# Patient Record
Sex: MALE | Race: WHITE | NOT HISPANIC OR LATINO | Employment: OTHER
[De-identification: names, ages, dates, MRNs, and addresses within clinical notes are randomized per-mention and may not be internally consistent; named-entity substitution may affect disease eponyms.]

---

## 2018-07-18 ENCOUNTER — TRANSCRIBE ORDERS (OUTPATIENT)
Dept: SCHEDULING | Age: 73
End: 2018-07-18

## 2018-07-18 DIAGNOSIS — I87.2 VENOUS INSUFFICIENCY (CHRONIC) (PERIPHERAL): Primary | ICD-10-CM

## 2018-07-20 ENCOUNTER — HOSPITAL ENCOUNTER (OUTPATIENT)
Dept: CARDIOLOGY | Facility: HOSPITAL | Age: 73
Discharge: HOME | End: 2018-07-20
Attending: RADIOLOGY
Payer: MEDICARE

## 2018-07-20 DIAGNOSIS — I87.2 VENOUS INSUFFICIENCY (CHRONIC) (PERIPHERAL): ICD-10-CM

## 2018-07-24 ENCOUNTER — HOSPITAL ENCOUNTER (OUTPATIENT)
Dept: RADIOLOGY | Facility: HOSPITAL | Age: 73
Discharge: HOME | End: 2018-07-24
Attending: RADIOLOGY
Payer: MEDICARE

## 2018-07-24 DIAGNOSIS — I87.2 PERIPHERAL VENOUS INSUFFICIENCY: ICD-10-CM

## 2018-07-24 DIAGNOSIS — R60.0 LOCALIZED EDEMA: ICD-10-CM

## 2018-07-24 PROCEDURE — 93971 EXTREMITY STUDY: CPT | Mod: RT

## 2018-07-25 RX ORDER — SODIUM CHLORIDE 9 MG/ML
40 INJECTION, SOLUTION INTRAVENOUS CONTINUOUS
Status: CANCELLED | OUTPATIENT
Start: 2018-07-25 | End: 2018-08-01

## 2018-08-10 ENCOUNTER — HOSPITAL ENCOUNTER (OUTPATIENT)
Dept: RADIOLOGY | Facility: HOSPITAL | Age: 73
Discharge: HOME | End: 2018-08-10
Attending: RADIOLOGY
Payer: MEDICARE

## 2018-08-10 VITALS
SYSTOLIC BLOOD PRESSURE: 129 MMHG | WEIGHT: 248 LBS | RESPIRATION RATE: 16 BRPM | HEIGHT: 72 IN | TEMPERATURE: 97.4 F | OXYGEN SATURATION: 97 % | HEART RATE: 71 BPM | DIASTOLIC BLOOD PRESSURE: 67 MMHG | BODY MASS INDEX: 33.59 KG/M2

## 2018-08-10 DIAGNOSIS — I87.399: ICD-10-CM

## 2018-08-10 LAB
BASOPHILS # BLD: 0.03 K/UL (ref 0.01–0.1)
BASOPHILS NFR BLD: 0.6 %
DIFFERENTIAL METHOD BLD: ABNORMAL
EOSINOPHIL # BLD: 0.18 K/UL (ref 0.04–0.54)
EOSINOPHIL NFR BLD: 3.6 %
ERYTHROCYTE [DISTWIDTH] IN BLOOD BY AUTOMATED COUNT: 12.6 % (ref 11.6–14.4)
GLUCOSE BLD-MCNC: 130 MG/DL (ref 70–99)
HCT VFR BLDCO AUTO: 41.3 % (ref 40.1–51)
HGB BLD-MCNC: 14.1 G/DL (ref 13.7–17.5)
IMM GRANULOCYTES # BLD AUTO: 0.03 K/UL (ref 0–0.08)
IMM GRANULOCYTES NFR BLD AUTO: 0.6 %
INR PPP: 2.2 INR
LYMPHOCYTES # BLD: 0.76 K/UL (ref 1.2–3.5)
LYMPHOCYTES NFR BLD: 15.2 %
MCH RBC QN AUTO: 31.8 PG (ref 28–33.2)
MCHC RBC AUTO-ENTMCNC: 34.1 G/DL (ref 32.2–36.5)
MCV RBC AUTO: 93.2 FL (ref 83–98)
MONOCYTES # BLD: 0.38 K/UL (ref 0.3–1)
MONOCYTES NFR BLD: 7.6 %
NEUTROPHILS # BLD: 3.63 K/UL (ref 1.7–7)
NEUTS SEG NFR BLD: 72.4 %
NRBC BLD-RTO: 0 %
PDW BLD AUTO: 9.6 FL (ref 9.4–12.4)
PLATELET # BLD AUTO: 105 K/UL (ref 150–350)
POCT TEST: ABNORMAL
PROTHROMBIN TIME: 25.1 SEC. (ref 12.2–14.5)
RBC # BLD AUTO: 4.43 M/UL (ref 4.5–5.8)
WBC # BLD AUTO: 5.01 K/UL (ref 3.8–10.5)

## 2018-08-10 PROCEDURE — 36415 COLL VENOUS BLD VENIPUNCTURE: CPT | Performed by: PHYSICIAN ASSISTANT

## 2018-08-10 PROCEDURE — 85025 COMPLETE CBC W/AUTO DIFF WBC: CPT | Performed by: PHYSICIAN ASSISTANT

## 2018-08-10 PROCEDURE — 25800000 HC PHARMACY IV SOLUTIONS: Performed by: PHYSICIAN ASSISTANT

## 2018-08-10 PROCEDURE — 63600000 HC DRUGS/DETAIL CODE: Performed by: RADIOLOGY

## 2018-08-10 PROCEDURE — 36100345 IR SCLEROTHERAPY

## 2018-08-10 PROCEDURE — 3E033TZ INTRODUCTION OF DESTRUCTIVE AGENT INTO PERIPHERAL VEIN, PERCUTANEOUS APPROACH: ICD-10-PCS | Performed by: RADIOLOGY

## 2018-08-10 PROCEDURE — 85610 PROTHROMBIN TIME: CPT | Mod: GZ | Performed by: PHYSICIAN ASSISTANT

## 2018-08-10 RX ORDER — ROSUVASTATIN CALCIUM 20 MG/1
20 TABLET, COATED ORAL EVERY EVENING
COMMUNITY

## 2018-08-10 RX ORDER — WARFARIN 2.5 MG/1
2.5 TABLET ORAL WEEKLY
COMMUNITY
End: 2021-06-07 | Stop reason: HOSPADM

## 2018-08-10 RX ORDER — SODIUM CHLORIDE 9 MG/ML
40 INJECTION, SOLUTION INTRAVENOUS CONTINUOUS
Status: DISCONTINUED | OUTPATIENT
Start: 2018-08-10 | End: 2018-08-10 | Stop reason: HOSPADM

## 2018-08-10 RX ORDER — DOFETILIDE 0.25 MG/1
250 CAPSULE ORAL 2 TIMES DAILY
COMMUNITY
End: 2023-12-12 | Stop reason: HOSPADM

## 2018-08-10 RX ORDER — FUROSEMIDE 20 MG/1
20 TABLET ORAL DAILY
COMMUNITY

## 2018-08-10 RX ORDER — CARVEDILOL 12.5 MG/1
12.5 TABLET ORAL 2 TIMES DAILY WITH MEALS
COMMUNITY

## 2018-08-10 RX ORDER — ASPIRIN 81 MG/1
81 TABLET ORAL DAILY
COMMUNITY

## 2018-08-10 RX ORDER — FENTANYL CITRATE 50 UG/ML
INJECTION, SOLUTION INTRAMUSCULAR; INTRAVENOUS
Status: COMPLETED | OUTPATIENT
Start: 2018-08-10 | End: 2018-08-10

## 2018-08-10 RX ORDER — LOSARTAN POTASSIUM 100 MG/1
100 TABLET ORAL EVERY EVENING
Status: ON HOLD | COMMUNITY
End: 2023-11-28 | Stop reason: ALTCHOICE

## 2018-08-10 RX ORDER — POTASSIUM CHLORIDE 20 MEQ/1
20 TABLET, EXTENDED RELEASE ORAL DAILY
COMMUNITY

## 2018-08-10 RX ADMIN — SODIUM CHLORIDE 40 ML/HR: 9 INJECTION, SOLUTION INTRAVENOUS at 09:37

## 2018-08-10 RX ADMIN — FENTANYL CITRATE 50 MCG: 50 INJECTION INTRAMUSCULAR; INTRAVENOUS at 10:51

## 2018-08-10 NOTE — POST-PROCEDURE NOTE
Interventional Radiology Brief Postprocedure Note    Roberto Stiles     Attending: Jaclyn    Assistant:      Diagnosis: Recurrent right lower leg swelling; right lower leg superficial venous varicosities    Description of procedure: u/s guided sclerotherapy of right lower leg superficial venous varicosities    Contrast:       Anesthesia:   None    Medications: Fentanyl 50 mcg IV     Complications: None      Estimated Blood Loss: Estimated Blood Loss: 0-10 ml    Anticoagulation:      Specimens:      Findings: Successful u/s guided sclerotherapy of right lower leg superficial venous varicosities - 3 access sites.    8/10/2018 11:08 AM

## 2018-08-10 NOTE — DISCHARGE INSTRUCTIONS
"Ultrasound- guided Lower Extremity Sclerotherapy    Discharge Instructions  You have had ultrasound guided sclerotherapy of lower leg superficial venous varicosities.    You may resume your normal diet.  You may resume your normal medications.   Please resume normal activities. It is best to be active.    It is normal to experience some soreness at the access sites or a \"cord-like\" sensation over the treated veins for few days.     If the treated veins becomes very tender to touch, you are likely experiencing post-ablation thrombophlebitis. This can be easily treated with anti-inflammatory medication. The recommended dosage is 800 mg Ibuprofen every 8 hours for 1.5 days.     Notify your primary physician and/or Interventional Radiologist IMMEDIATELY if any of the following occur:  · Fever of 101° F (38 ° C) or chills  · Swelling and or redness near the access sites  · Worsening leg pain or neuropathy      Physician Contact Information  • If you have a problem that you believe requires immediate attention, you should go to the Emergency Room, either at [facility] or the closest hospital. If you believe that your problem can safely wait until you speak to a physician, you should contact your doctor or Interventional Radiologist.   • It is usually easier to contact your own physician by calling the office, or after hours through the answering service. If you do not know the number, the hospital  can connect you by calling [phone].   • If you have concerns that need to be answered by the Interventional Radiologist, you can reach him/ her as follows:   o During regular weekday hours (8AM to 5PM), call [phone] and ask the technologist to connect you with the Interventional Radiologist.   o During off hours for emergencies call [phone] and ask the hospital  to contact the Interventional Radiologist on-call.  \      "

## 2019-04-02 ENCOUNTER — APPOINTMENT (OUTPATIENT)
Dept: LAB | Facility: HOSPITAL | Age: 74
End: 2019-04-02
Attending: ORTHOPAEDIC SURGERY
Payer: MEDICARE

## 2019-04-02 ENCOUNTER — TRANSCRIBE ORDERS (OUTPATIENT)
Dept: PREADMISSION TESTING | Facility: HOSPITAL | Age: 74
End: 2019-04-02

## 2019-04-02 ENCOUNTER — HOSPITAL ENCOUNTER (OUTPATIENT)
Dept: CARDIOLOGY | Facility: HOSPITAL | Age: 74
Discharge: HOME | End: 2019-04-02
Attending: ORTHOPAEDIC SURGERY
Payer: MEDICARE

## 2019-04-02 ENCOUNTER — APPOINTMENT (OUTPATIENT)
Dept: PREADMISSION TESTING | Facility: HOSPITAL | Age: 74
End: 2019-04-02
Attending: ORTHOPAEDIC SURGERY
Payer: MEDICARE

## 2019-04-02 VITALS
TEMPERATURE: 98.4 F | DIASTOLIC BLOOD PRESSURE: 60 MMHG | BODY MASS INDEX: 33.05 KG/M2 | HEART RATE: 76 BPM | OXYGEN SATURATION: 97 % | HEIGHT: 72 IN | SYSTOLIC BLOOD PRESSURE: 110 MMHG | WEIGHT: 244 LBS | RESPIRATION RATE: 18 BRPM

## 2019-04-02 DIAGNOSIS — Z01.818 ENCOUNTER FOR OTHER PREPROCEDURAL EXAMINATION: ICD-10-CM

## 2019-04-02 DIAGNOSIS — I48.91 ATRIAL FIBRILLATION, UNSPECIFIED TYPE (CMS/HCC): ICD-10-CM

## 2019-04-02 DIAGNOSIS — M17.12 PRIMARY OSTEOARTHRITIS OF LEFT KNEE: ICD-10-CM

## 2019-04-02 DIAGNOSIS — I10 ESSENTIAL HYPERTENSION: ICD-10-CM

## 2019-04-02 DIAGNOSIS — Z79.4 TYPE 2 DIABETES MELLITUS WITHOUT COMPLICATION, WITH LONG-TERM CURRENT USE OF INSULIN (CMS/HCC): ICD-10-CM

## 2019-04-02 DIAGNOSIS — E11.9 TYPE 2 DIABETES MELLITUS WITHOUT COMPLICATION, WITH LONG-TERM CURRENT USE OF INSULIN (CMS/HCC): ICD-10-CM

## 2019-04-02 DIAGNOSIS — E78.5 HYPERLIPIDEMIA, UNSPECIFIED HYPERLIPIDEMIA TYPE: ICD-10-CM

## 2019-04-02 DIAGNOSIS — M17.12 PRIMARY OSTEOARTHRITIS OF LEFT KNEE: Primary | ICD-10-CM

## 2019-04-02 DIAGNOSIS — G47.33 OSA ON CPAP: ICD-10-CM

## 2019-04-02 DIAGNOSIS — Z01.818 PREOPERATIVE EXAMINATION: Primary | ICD-10-CM

## 2019-04-02 LAB
ANION GAP SERPL CALC-SCNC: 14 MEQ/L (ref 3–15)
ATRIAL RATE: 74
BUN SERPL-MCNC: 25 MG/DL (ref 8–20)
CALCIUM SERPL-MCNC: 9.1 MG/DL (ref 8.9–10.3)
CHLORIDE SERPL-SCNC: 104 MEQ/L (ref 98–109)
CO2 SERPL-SCNC: 18 MEQ/L (ref 22–32)
CREAT SERPL-MCNC: 1.1 MG/DL
ERYTHROCYTE [DISTWIDTH] IN BLOOD BY AUTOMATED COUNT: 12.9 % (ref 11.6–14.4)
EST. AVERAGE GLUCOSE BLD GHB EST-MCNC: 160 MG/DL
GFR SERPL CREATININE-BSD FRML MDRD: >60 ML/MIN/1.73M*2
GLUCOSE SERPL-MCNC: 152 MG/DL (ref 70–99)
HBA1C MFR BLD HPLC: 7.2 %
HCT VFR BLDCO AUTO: 44.8 %
HGB BLD-MCNC: 15.6 G/DL
MCH RBC QN AUTO: 31.9 PG (ref 28–33.2)
MCHC RBC AUTO-ENTMCNC: 34.8 G/DL (ref 32.2–36.5)
MCV RBC AUTO: 91.6 FL (ref 83–98)
PDW BLD AUTO: 9.5 FL (ref 9.4–12.4)
PLATELET # BLD AUTO: 148 K/UL
POTASSIUM SERPL-SCNC: 4.7 MEQ/L (ref 3.6–5.1)
QRS DURATION: 142
QT INTERVAL: 450
QTC CALCULATION(BAZETT): 502
R AXIS: 73
RBC # BLD AUTO: 4.89 M/UL (ref 4.5–5.8)
SODIUM SERPL-SCNC: 136 MEQ/L (ref 136–144)
T WAVE AXIS: 251
VENTRICULAR RATE: 75
WBC # BLD AUTO: 5.17 K/UL

## 2019-04-02 PROCEDURE — 36415 COLL VENOUS BLD VENIPUNCTURE: CPT

## 2019-04-02 PROCEDURE — 93005 ELECTROCARDIOGRAM TRACING: CPT

## 2019-04-02 PROCEDURE — 83036 HEMOGLOBIN GLYCOSYLATED A1C: CPT | Mod: GA

## 2019-04-02 PROCEDURE — 82310 ASSAY OF CALCIUM: CPT

## 2019-04-02 PROCEDURE — 85027 COMPLETE CBC AUTOMATED: CPT

## 2019-04-02 PROCEDURE — 99203 OFFICE O/P NEW LOW 30 MIN: CPT | Performed by: HOSPITALIST

## 2019-04-02 RX ORDER — VIT C/E/ZN/COPPR/LUTEIN/ZEAXAN 250MG-90MG
1000 CAPSULE ORAL DAILY
COMMUNITY

## 2019-04-02 RX ORDER — WARFARIN SODIUM 5 MG/1
5 TABLET ORAL EVERY EVENING
COMMUNITY
End: 2021-06-07 | Stop reason: HOSPADM

## 2019-04-02 ASSESSMENT — PAIN SCALES - GENERAL: PAINLEVEL: 0-NO PAIN

## 2019-04-02 NOTE — CONSULTS
[facility] Medicine Service -  Pre-Operative Consultation       Patient Name: Roberto Stiles  Referring Surgeon: Dr. Shafer    Reason for Referral: Pre-Operative Evaluation  Surgical Procedure: L TKA  Operative Date: 4/16/19  Other Providers:      PCP: Willa Ambrocio MD        HISTORY OF PRESENT ILLNESS      Roberto Stiles is a 73 y.o. male presenting today to the [facility] at [facility] for pre-operative evaluation prior to planned surgery.    Patient has a PMH of bacterial endocarditis, DVT, HTN, mitral valve replacement, SANA on CPAP, atrial fibrillation,  kidney stones, HLD, MI, and DM.    The patient denies any current or recent chest pain or pressure, dyspnea, cough, sputum, fevers, chills, abdominal pain, nausea, vomiting, diarrhea or other symptoms. Functionally, the patient is able to ascend a flight or so of stairs with no dyspnea or chest pain.     The patient is scheduled to see his Cardiologist Dr. Teran this coming Monday. He states he is compliant with all of his cardiac medications. Patient has a significant history of Vtach and afib requiring ablation x3. He has a PPM/defibrillator that has been replaced once. He had his mitral valve replaced many years ago.     He also is on multiple medications for DM and is compliant with his regimen. His last A1c was 8 in November and he is anxious about his recheck today.    As far as his knee, he has had chronic pain for approximately 10 years ago. He had a fall where he landed on his knee this past January, which acutely worsened his arthritis and pain. He has been wearing a knee brace which has provided relief. Prior to this he was receiving cortisone shots.    The patient denies, on specific questioning, the following:  No history of CHF.  No history of PE.  No history of COPD.  No history of CVA.   No history of CKD.     PAST MEDICAL AND SURGICAL HISTORY      Past Medical History:   Diagnosis Date   • A-fib  (CMS/HCC) (HCC)    • Arthritis     OA   • Bacterial endocarditis    • Deep vein thrombosis (CMS/HCC) (HCC)     LLE over 30 years ago   • History of transfusion     for MV replacement x34 years ago   • Hypertension    • Kidney stones    • Lipid disorder    • Myocardial infarction (CMS/HCC) (HCC)    • SCC (squamous cell carcinoma)     right ear   • Sleep apnea     wears CPAP   • Type 2 diabetes mellitus (CMS/HCC) (HCC)     last A1C 11/2018   • Venous insufficiency        Past Surgical History:   Procedure Laterality Date   • CARDIAC ELECTROPHYSIOLOGY MAPPING AND ABLATION     • CARDIAC SURGERY     • MITRAL VALVE REPLACEMENT     • TONSILLECTOMY         MEDICATIONS        Current Outpatient Prescriptions:   •  cholecalciferol, vitamin D3, (VITAMIN D3) 1,000 unit capsule, Take 1,000 Units by mouth daily., Disp: , Rfl:   •  FISH OIL-omega-3 fatty acids (FISH OIL) 340-1,000 mg capsule, Take 1 capsule by mouth 2 (two) times a day., Disp: , Rfl:   •  multivitamin tablet, Take by mouth daily., Disp: , Rfl:   •  NOT IN DATABASE, Prevagen 10mg daily, Disp: , Rfl:   •  warfarin (COUMADIN) 5 mg tablet, Take 5 mg by mouth every evening. Mon, Tues, Wed, Thurs Sat and Sun, Disp: , Rfl:   •  aspirin 81 mg enteric coated tablet, Take 81 mg by mouth daily., Disp: , Rfl:   •  carvedilol (COREG) 12.5 mg tablet, Take 12.5 mg by mouth 2 (two) times a day with meals., Disp: , Rfl:   •  dofetilide (TIKOSYN) 250 mcg capsule, Take 250 mcg by mouth 2 (two) times a day., Disp: , Rfl:   •  empagliflozin-metformin (SYNJARDY) 12.5-500 mg tablet, Take 1 tablet by mouth 2 (two) times a day., Disp: , Rfl:   •  furosemide (LASIX) 20 mg tablet, Take 20 mg by mouth daily. As needed for leg swelling, Disp: , Rfl:   •  insulin detemir U-100 (LEVEMIR) 100 unit/mL (3 mL) subcutaneous pen, Inject 45 Units under the skin nightly., Disp: , Rfl:   •  losartan (COZAAR) 100 mg tablet, Take 100 mg by mouth every evening.  , Disp: , Rfl:   •  potassium chloride  (KLOR-CON) 20 mEq CR tablet, Take 20 mEq by mouth daily., Disp: , Rfl:   •  rosuvastatin (CRESTOR) 20 mg tablet, Take 20 mg by mouth every evening.  , Disp: , Rfl:   •  semaglutide (OZEMPIC) 1 mg/0.75 mL (2 mg/1.5 mL) pen injector, Inject 1 mg/mL under the skin once a week. Patient takes on SUNday, Disp: , Rfl:   •  warfarin (COUMADIN) 2.5 mg tablet, Take 2.5 mg by mouth once a week. Friday only , Disp: , Rfl:     ALLERGIES      Patient has no known allergies.    FAMILY HISTORY      family history is not on file.    Denies any prior known family history of DVTs/PEs/clotting disorder    SOCIAL HISTORY      Social History   Substance Use Topics   • Smoking status: Never Smoker   • Smokeless tobacco: Never Used   • Alcohol use 1.8 oz/week     3 Shots of liquor per week       REVIEW OF SYSTEMS      All other systems reviewed and negative except as noted in HPI    PHYSICAL EXAMINATION      /60 (BP Location: Right upper arm, Patient Position: Sitting)   Pulse 76   Temp 36.9 °C (98.4 °F) (Temporal)   Resp 18   Ht 1.829 m (6')   Wt 111 kg (244 lb)   SpO2 97%   BMI 33.09 kg/m²   Body mass index is 33.09 kg/m².    Physical Exam  General: NAD, calm, pleasant  HEENT: atraumatic  Cardiovascular: RRR, systolic click; S1/S2+  Pulmonary: CTAB; no rales, rhonchi or wheezing  Gi: Soft, nontender, nondistended  Ext: trace pedal edema; bilateral lower extremity discoloration  Neuro: Alert and oriented x3; no sensory or motor deficits  Psych: Calm, appropriate answers    LABS / EKG        Labs    Results from last 7 days  Lab Units 04/02/19  1040   WBC K/uL 5.17   HEMOGLOBIN g/dL 15.6   HEMATOCRIT % 44.8   PLATELETS K/uL 148*       Results from last 7 days  Lab Units 04/02/19  1040   SODIUM mEQ/L 136   POTASSIUM mEQ/L 4.7   CHLORIDE mEQ/L 104   CO2 mEQ/L 18*   BUN mg/dL 25*   CREATININE mg/dL 1.1   GLUCOSE mg/dL 152*   CALCIUM mg/dL 9.1       Results from last 7 days  Lab Units 04/02/19  1040   HEMOGLOBIN A1C % 7.2*        Lab Results   Component Value Date     07/06/2015    K 4.5 07/06/2015     (H) 07/06/2015    BUN 22 (H) 07/06/2015    CREATININE 1.3 07/06/2015    WBC 5.01 08/10/2018    HGB 14.1 08/10/2018    HCT 41.3 08/10/2018     (L) 08/10/2018    ALT 30 07/06/2015    AST 24 07/06/2015    INR 2.2 08/10/2018    HGBA1C 11.3 (H) 06/02/2015         ECG/Telemetry  I have independently reviewed the ECG. Significant findings include v-paced rhythm.    ASSESSMENT AND PLAN         Preoperative examination  -Patient has no cardiac symptoms.   -Patient exhibits no signs/symptoms of angina, arrythmia or decompenstated CHF.   -Patient does not have new significant EKG changes  -Patient has been advised to avoid NSAIDs 5-7 days prior to surgery  -He must receive clearance from his Cardiologist Dr. Teran prior to surgery. Will need recommendations from him regarding his ASA, coumadin, and fish oil  -Will also need to check a HbA1c prior to surgery    Type 2 diabetes mellitus, with long-term current use of insulin (CMS/[facility])  -Last Hba1c 8 in November, 7.2 today  -Continue Levemir, empaglifozin-metformin, and Ozempic  -Patient will need to take 1/2 dose of Levemir prior to surgery  -He should hold his empaglifozin-metformin on day of surgery. OK to take Ozempic on Sunday prior to surgery    SANA on CPAP  -Continue CPAP nightly  -Instructed to bring machine on day of surgery    Essential hypertension  -Continue Coreg and Losartan  -Hold Losartan on morning of surgery  -Continue to monitor pressures    Atrial fibrillation (CMS/[facility])  -S/P history of ablation (for afib and Vtach)  -Continue ASA, Coreg, Tikosyn, Coumadin  -Need recommendations from Dr. Teran regarding when to hold ASA/ coumadin prior to surgery  -Will need telemetry at all times perioperatively due to Tikosyn. Should also check BMP/mag/phos daily due to this medication    HLD (hyperlipidemia)  -Continue statin and fish oil  -Will need to hold fish oil at  least 10 days prior to surgery       In regards to perioperative cardiac risk:  The patient has a history of ischemic heart disease, denies any history of CHF, denies any history of CVA, is on pre-operative treatment with insulin, and does not have a pre-operative creatinine > 2 mg/dL.   The Revised Cardiac Risk Index (RCRI) for this patient indicates 6.6 % risk for rate of cardiac death, nonfatal myocardial infarction, and nonfatal cardiac arrest.     Further comments:  Resume supplements when OK with surgical team.  I would encourage incentive spirometry to assist with minimizing navid-operative pulmonary risk.  DVT prophylaxis and timing of such per the discretion of the surgeon.     Please do not hesitate to contact [facility] during the upcoming hospitalization with any questions or concerns.     Denae Duke, DO  4/2/2019

## 2019-04-02 NOTE — ASSESSMENT & PLAN NOTE
-Continue Coreg and Losartan  -Hold Losartan on morning of surgery  -Continue to monitor pressures

## 2019-04-02 NOTE — ASSESSMENT & PLAN NOTE
-S/P history of ablation (for afib and Vtach)  -Continue ASA, Coreg, Tikosyn, Coumadin  -Need recommendations from Dr. Teran regarding when to hold ASA/ coumadin prior to surgery  -Will need telemetry at all times perioperatively due to Tikosyn. Should also check BMP/mag/phos daily due to this medication

## 2019-04-02 NOTE — PRE-PROCEDURE INSTRUCTIONS
1. Admissions will call you with your arrival time on April 15th (day prior to surgery) between 2pm - 4pm. For questions about your arrival time, please call [phone].    2. Please report to the [facility] on the day of your procedure. Bring your insurance card and photo ID.    3. Please follow these fasting guidelines:   -Stop food and liquids 8 hours before your arrival time.   -You may continue to drink up to 12 oz of water,but you must stop 2 hours prior to your arrival time.   -There is nothing to drink 2 hours before your arrival time (including gum, mints, candy, and water). May Brush Teeth    4. Early on the morning of the procedure, please take the following medications listed below with a sip of water, in addition to any medications prescribed by your surgeon: Tikosyn, and Carvedilol, bring in CPAP      *NO aspirin, ibuprofen, anti-inflammatories, fish oil or Vitamin E 14 days prior to surgery unless ordered by physician.    *Stop taking coumadin, aspirin and fish oil per Dr Teran     5. Other instructions: antibacterial shower x 2- the night before and the morning of surgery    6. If you develop a cough, cold, fever, or other symptom prior to the date of the procedure, please report it to your physician immediately.    7. If you need to cancel the procedure for any reason, please contact your physician.    8. Make arrangements to have someone drive you home from the procedure. If you have not arranged for transportation home, your surgery may be cancelled.     9. You may not take public transportation or or a cab unless accompanied by a responsible person.    10. You may not drive a car or operate complex or potentially dangerous machinery for 24 hours following anesthesia and/or sedation.    11. If it is medically necessary for you to have a longer stay, you will be informed as soon as the decision is made.    12. Do not wear or bring anything of value to the hospital, including jewelry of any  kind. Do not wear make-up or contact lenses. DO bring your glasses and hearing aid, with a case.    13. Dress in comfortable clothes.    14. If instructed, please bring a copy of your Advance Directive (Living Will/Durable Power of ) on the day of your procedure.    Pre operative instructions given as per protocol.  Form explained by:     Radha Bui RN

## 2019-04-02 NOTE — ASSESSMENT & PLAN NOTE
-Last Hba1c 8 in November, 7.2 today  -Continue Levemir, empaglifozin-metformin, and Ozempic  -Patient will need to take 1/2 dose of Levemir prior to surgery  -He should hold his empaglifozin-metformin on day of surgery. OK to take Ozempic on Sunday prior to surgery

## 2019-04-02 NOTE — ASSESSMENT & PLAN NOTE
-Patient has no cardiac symptoms.   -Patient exhibits no signs/symptoms of angina, arrythmia or decompenstated CHF.   -Patient does not have new significant EKG changes  -Patient has been advised to avoid NSAIDs 5-7 days prior to surgery  -He must receive clearance from his Cardiologist Dr. Teran prior to surgery. Will need recommendations from him regarding his ASA, coumadin, and fish oil  -Will also need to check a HbA1c prior to surgery

## 2019-04-16 ENCOUNTER — ANESTHESIA EVENT (OUTPATIENT)
Dept: OPERATING ROOM | Facility: HOSPITAL | Age: 74
Setting detail: SURGERY ADMIT
DRG: 470 | End: 2019-04-16
Payer: MEDICARE

## 2019-04-16 ENCOUNTER — HOSPITAL ENCOUNTER (INPATIENT)
Facility: HOSPITAL | Age: 74
LOS: 3 days | Discharge: HOME | DRG: 470 | End: 2019-04-19
Attending: ORTHOPAEDIC SURGERY | Admitting: ORTHOPAEDIC SURGERY
Payer: MEDICARE

## 2019-04-16 ENCOUNTER — APPOINTMENT (OUTPATIENT)
Dept: RADIOLOGY | Facility: HOSPITAL | Age: 74
Setting detail: SURGERY ADMIT
DRG: 470 | End: 2019-04-16
Attending: NURSE PRACTITIONER
Payer: MEDICARE

## 2019-04-16 DIAGNOSIS — I48.91 ATRIAL FIBRILLATION, UNSPECIFIED TYPE (CMS/HCC): ICD-10-CM

## 2019-04-16 DIAGNOSIS — M17.12 OSTEOARTHRITIS OF LEFT KNEE, UNSPECIFIED OSTEOARTHRITIS TYPE: ICD-10-CM

## 2019-04-16 DIAGNOSIS — Z96.652 S/P TOTAL KNEE ARTHROPLASTY, LEFT: Primary | ICD-10-CM

## 2019-04-16 PROBLEM — Z95.3 HISTORY OF HEART VALVE REPLACEMENT WITH BIOPROSTHETIC VALVE: Status: ACTIVE | Noted: 2019-04-16

## 2019-04-16 LAB
APTT PPP: 39 SEC (ref 23–35)
GLUCOSE BLD-MCNC: 135 MG/DL (ref 70–99)
GLUCOSE BLD-MCNC: 156 MG/DL (ref 70–99)
GLUCOSE BLD-MCNC: 164 MG/DL (ref 70–99)
GLUCOSE BLD-MCNC: 172 MG/DL (ref 70–99)
INR PPP: 1.9 INR
POCT TEST: ABNORMAL
PROTHROMBIN TIME: 20.5 SEC (ref 12.2–14.5)

## 2019-04-16 PROCEDURE — 25800000 HC PHARMACY IV SOLUTIONS: Performed by: NURSE ANESTHETIST, CERTIFIED REGISTERED

## 2019-04-16 PROCEDURE — 63600000 HC DRUGS/DETAIL CODE: Performed by: NURSE PRACTITIONER

## 2019-04-16 PROCEDURE — 25000000 HC PHARMACY GENERAL: Performed by: NURSE ANESTHETIST, CERTIFIED REGISTERED

## 2019-04-16 PROCEDURE — 25800000 HC PHARMACY IV SOLUTIONS: Performed by: NURSE PRACTITIONER

## 2019-04-16 PROCEDURE — 25000000 HC PHARMACY GENERAL: Performed by: ORTHOPAEDIC SURGERY

## 2019-04-16 PROCEDURE — 0SRD0J9 REPLACEMENT OF LEFT KNEE JOINT WITH SYNTHETIC SUBSTITUTE, CEMENTED, OPEN APPROACH: ICD-10-PCS | Performed by: ORTHOPAEDIC SURGERY

## 2019-04-16 PROCEDURE — C1713 ANCHOR/SCREW BN/BN,TIS/BN: HCPCS | Performed by: ORTHOPAEDIC SURGERY

## 2019-04-16 PROCEDURE — 71000011 HC PACU PHASE 1 EA ADDL MIN: Performed by: ORTHOPAEDIC SURGERY

## 2019-04-16 PROCEDURE — 97116 GAIT TRAINING THERAPY: CPT | Mod: GP

## 2019-04-16 PROCEDURE — 36000016 HC OR LEVEL 6 EA ADDL MIN: Performed by: ORTHOPAEDIC SURGERY

## 2019-04-16 PROCEDURE — 99232 SBSQ HOSP IP/OBS MODERATE 35: CPT | Performed by: HOSPITALIST

## 2019-04-16 PROCEDURE — 71000001 HC PACU PHASE 1 INITIAL 30MIN: Performed by: ORTHOPAEDIC SURGERY

## 2019-04-16 PROCEDURE — C1776 JOINT DEVICE (IMPLANTABLE): HCPCS | Performed by: ORTHOPAEDIC SURGERY

## 2019-04-16 PROCEDURE — 25800000 HC PHARMACY IV SOLUTIONS: Performed by: ORTHOPAEDIC SURGERY

## 2019-04-16 PROCEDURE — 63600000 HC DRUGS/DETAIL CODE: Performed by: NURSE ANESTHETIST, CERTIFIED REGISTERED

## 2019-04-16 PROCEDURE — 63700000 HC SELF-ADMINISTRABLE DRUG: Performed by: PHYSICIAN ASSISTANT

## 2019-04-16 PROCEDURE — 36415 COLL VENOUS BLD VENIPUNCTURE: CPT | Performed by: ORTHOPAEDIC SURGERY

## 2019-04-16 PROCEDURE — 27800000 HC SUPPLY/IMPLANTS: Performed by: ORTHOPAEDIC SURGERY

## 2019-04-16 PROCEDURE — 97530 THERAPEUTIC ACTIVITIES: CPT | Mod: GO

## 2019-04-16 PROCEDURE — 88311 DECALCIFY TISSUE: CPT | Performed by: ORTHOPAEDIC SURGERY

## 2019-04-16 PROCEDURE — 97165 OT EVAL LOW COMPLEX 30 MIN: CPT | Mod: GO

## 2019-04-16 PROCEDURE — 36000006 HC OR LEVEL 6 INITIAL 30MIN: Performed by: ORTHOPAEDIC SURGERY

## 2019-04-16 PROCEDURE — 93005 ELECTROCARDIOGRAM TRACING: CPT | Performed by: NURSE PRACTITIONER

## 2019-04-16 PROCEDURE — 73560 X-RAY EXAM OF KNEE 1 OR 2: CPT | Mod: LT

## 2019-04-16 PROCEDURE — 63600000 HC DRUGS/DETAIL CODE: Performed by: ORTHOPAEDIC SURGERY

## 2019-04-16 PROCEDURE — 97161 PT EVAL LOW COMPLEX 20 MIN: CPT | Mod: GP

## 2019-04-16 PROCEDURE — 85730 THROMBOPLASTIN TIME PARTIAL: CPT | Performed by: ORTHOPAEDIC SURGERY

## 2019-04-16 PROCEDURE — 63700000 HC SELF-ADMINISTRABLE DRUG: Performed by: NURSE PRACTITIONER

## 2019-04-16 PROCEDURE — 12000000 HC ROOM AND CARE MED/SURG

## 2019-04-16 PROCEDURE — 85610 PROTHROMBIN TIME: CPT | Performed by: ORTHOPAEDIC SURGERY

## 2019-04-16 PROCEDURE — 63700000 HC SELF-ADMINISTRABLE DRUG: Performed by: ORTHOPAEDIC SURGERY

## 2019-04-16 PROCEDURE — 27200000 HC STERILE SUPPLY: Performed by: ORTHOPAEDIC SURGERY

## 2019-04-16 PROCEDURE — 37000001 HC ANESTHESIA GENERAL: Performed by: ORTHOPAEDIC SURGERY

## 2019-04-16 DEVICE — IMPLANTABLE DEVICE: Type: IMPLANTABLE DEVICE | Site: KNEE | Status: FUNCTIONAL

## 2019-04-16 DEVICE — WASHER: Type: IMPLANTABLE DEVICE | Site: KNEE | Status: FUNCTIONAL

## 2019-04-16 DEVICE — BEARING SIZE 8 TIBIAL BASE FIXED: Type: IMPLANTABLE DEVICE | Site: KNEE | Status: FUNCTIONAL

## 2019-04-16 DEVICE — KNEE ATTUNE RB W/DOME PAT PROCEDURE COST: Type: IMPLANTABLE DEVICE | Site: KNEE | Status: FUNCTIONAL

## 2019-04-16 DEVICE — PATELLA ATTUNE MEDALIZED DOME 41MM: Type: IMPLANTABLE DEVICE | Site: KNEE | Status: FUNCTIONAL

## 2019-04-16 DEVICE — COMPONENT FEMORAL ATTUNE PS SZ 8 LEFT: Type: IMPLANTABLE DEVICE | Site: KNEE | Status: FUNCTIONAL

## 2019-04-16 DEVICE — CEMENT BONE SIMPLEX FULL DOSE: Type: IMPLANTABLE DEVICE | Site: KNEE | Status: FUNCTIONAL

## 2019-04-16 RX ORDER — CARVEDILOL 12.5 MG/1
12.5 TABLET ORAL 2 TIMES DAILY WITH MEALS
Status: DISCONTINUED | OUTPATIENT
Start: 2019-04-16 | End: 2019-04-19 | Stop reason: HOSPADM

## 2019-04-16 RX ORDER — POTASSIUM CHLORIDE 750 MG/1
20 TABLET, FILM COATED, EXTENDED RELEASE ORAL DAILY
Status: DISCONTINUED | OUTPATIENT
Start: 2019-04-16 | End: 2019-04-19 | Stop reason: HOSPADM

## 2019-04-16 RX ORDER — ACETAMINOPHEN 325 MG/1
975 TABLET ORAL
Status: COMPLETED | OUTPATIENT
Start: 2019-04-16 | End: 2019-04-16

## 2019-04-16 RX ORDER — KETOROLAC TROMETHAMINE 15 MG/ML
15 INJECTION, SOLUTION INTRAMUSCULAR; INTRAVENOUS
Status: DISCONTINUED | OUTPATIENT
Start: 2019-04-16 | End: 2019-04-17

## 2019-04-16 RX ORDER — LABETALOL HCL 20 MG/4 ML
5 SYRINGE (ML) INTRAVENOUS AS NEEDED
Status: DISCONTINUED | OUTPATIENT
Start: 2019-04-16 | End: 2019-04-16 | Stop reason: HOSPADM

## 2019-04-16 RX ORDER — FENTANYL CITRATE 50 UG/ML
50 INJECTION, SOLUTION INTRAMUSCULAR; INTRAVENOUS
Status: DISCONTINUED | OUTPATIENT
Start: 2019-04-16 | End: 2019-04-16 | Stop reason: HOSPADM

## 2019-04-16 RX ORDER — ONDANSETRON HYDROCHLORIDE 2 MG/ML
4 INJECTION, SOLUTION INTRAVENOUS
Status: DISCONTINUED | OUTPATIENT
Start: 2019-04-16 | End: 2019-04-16 | Stop reason: HOSPADM

## 2019-04-16 RX ORDER — BISACODYL 10 MG/1
10 SUPPOSITORY RECTAL DAILY PRN
Status: DISCONTINUED | OUTPATIENT
Start: 2019-04-16 | End: 2019-04-19 | Stop reason: HOSPADM

## 2019-04-16 RX ORDER — ACETAMINOPHEN 325 MG/1
TABLET ORAL
Status: COMPLETED
Start: 2019-04-16 | End: 2019-04-17

## 2019-04-16 RX ORDER — ALUMINUM HYDROXIDE, MAGNESIUM HYDROXIDE, AND SIMETHICONE 1200; 120; 1200 MG/30ML; MG/30ML; MG/30ML
30 SUSPENSION ORAL EVERY 4 HOURS PRN
Status: DISCONTINUED | OUTPATIENT
Start: 2019-04-16 | End: 2019-04-19 | Stop reason: HOSPADM

## 2019-04-16 RX ORDER — ONDANSETRON HYDROCHLORIDE 2 MG/ML
4 INJECTION, SOLUTION INTRAVENOUS EVERY 8 HOURS PRN
Status: DISCONTINUED | OUTPATIENT
Start: 2019-04-16 | End: 2019-04-16

## 2019-04-16 RX ORDER — EPHEDRINE SULFATE 50 MG/ML
INJECTION, SOLUTION INTRAVENOUS AS NEEDED
Status: DISCONTINUED | OUTPATIENT
Start: 2019-04-16 | End: 2019-04-16 | Stop reason: SURG

## 2019-04-16 RX ORDER — ACETAMINOPHEN 325 MG/1
650 TABLET ORAL EVERY 4 HOURS PRN
Status: DISCONTINUED | OUTPATIENT
Start: 2019-04-16 | End: 2019-04-19 | Stop reason: HOSPADM

## 2019-04-16 RX ORDER — ACETAMINOPHEN 325 MG/1
650 TABLET ORAL
Status: COMPLETED | OUTPATIENT
Start: 2019-04-16 | End: 2019-04-18

## 2019-04-16 RX ORDER — POLYETHYLENE GLYCOL 3350 17 G/17G
17 POWDER, FOR SOLUTION ORAL DAILY
Status: DISCONTINUED | OUTPATIENT
Start: 2019-04-16 | End: 2019-04-19 | Stop reason: HOSPADM

## 2019-04-16 RX ORDER — LIDOCAINE HYDROCHLORIDE 10 MG/ML
INJECTION, SOLUTION INFILTRATION; PERINEURAL AS NEEDED
Status: DISCONTINUED | OUTPATIENT
Start: 2019-04-16 | End: 2019-04-16 | Stop reason: SURG

## 2019-04-16 RX ORDER — ASPIRIN 81 MG/1
81 TABLET ORAL DAILY
Status: DISCONTINUED | OUTPATIENT
Start: 2019-04-17 | End: 2019-04-19 | Stop reason: HOSPADM

## 2019-04-16 RX ORDER — DEXTROSE 40 %
15-30 GEL (GRAM) ORAL AS NEEDED
Status: DISCONTINUED | OUTPATIENT
Start: 2019-04-16 | End: 2019-04-19 | Stop reason: HOSPADM

## 2019-04-16 RX ORDER — AMOXICILLIN 250 MG
1 CAPSULE ORAL 2 TIMES DAILY
Status: DISCONTINUED | OUTPATIENT
Start: 2019-04-16 | End: 2019-04-19 | Stop reason: HOSPADM

## 2019-04-16 RX ORDER — SODIUM CHLORIDE 9 MG/ML
INJECTION, SOLUTION INTRAVENOUS CONTINUOUS
Status: CANCELLED | OUTPATIENT
Start: 2019-04-16 | End: 2019-04-17

## 2019-04-16 RX ORDER — WARFARIN SODIUM 5 MG/1
5 TABLET ORAL
Status: DISCONTINUED | OUTPATIENT
Start: 2019-04-16 | End: 2019-04-17

## 2019-04-16 RX ORDER — FENTANYL CITRATE 50 UG/ML
50 INJECTION, SOLUTION INTRAMUSCULAR; INTRAVENOUS
Status: CANCELLED | OUTPATIENT
Start: 2019-04-16

## 2019-04-16 RX ORDER — ROSUVASTATIN CALCIUM 20 MG/1
20 TABLET, COATED ORAL EVERY EVENING
Status: DISCONTINUED | OUTPATIENT
Start: 2019-04-16 | End: 2019-04-19 | Stop reason: HOSPADM

## 2019-04-16 RX ORDER — ROCURONIUM BROMIDE 10 MG/ML
INJECTION, SOLUTION INTRAVENOUS AS NEEDED
Status: DISCONTINUED | OUTPATIENT
Start: 2019-04-16 | End: 2019-04-16 | Stop reason: SURG

## 2019-04-16 RX ORDER — FENTANYL CITRATE 50 UG/ML
INJECTION, SOLUTION INTRAMUSCULAR; INTRAVENOUS AS NEEDED
Status: DISCONTINUED | OUTPATIENT
Start: 2019-04-16 | End: 2019-04-16 | Stop reason: SURG

## 2019-04-16 RX ORDER — GLYCOPYRROLATE 0.6MG/3ML
SYRINGE (ML) INTRAVENOUS AS NEEDED
Status: DISCONTINUED | OUTPATIENT
Start: 2019-04-16 | End: 2019-04-16 | Stop reason: SURG

## 2019-04-16 RX ORDER — ROPIVACAINE HYDROCHLORIDE 5 MG/ML
INJECTION, SOLUTION EPIDURAL; INFILTRATION; PERINEURAL AS NEEDED
Status: DISCONTINUED | OUTPATIENT
Start: 2019-04-16 | End: 2019-04-16 | Stop reason: HOSPADM

## 2019-04-16 RX ORDER — LABETALOL HYDROCHLORIDE 5 MG/ML
5 INJECTION, SOLUTION INTRAVENOUS AS NEEDED
Status: CANCELLED | OUTPATIENT
Start: 2019-04-16

## 2019-04-16 RX ORDER — IBUPROFEN 200 MG
16-32 TABLET ORAL AS NEEDED
Status: DISCONTINUED | OUTPATIENT
Start: 2019-04-16 | End: 2019-04-19 | Stop reason: HOSPADM

## 2019-04-16 RX ORDER — DIPHENHYDRAMINE HCL 25 MG
25 CAPSULE ORAL EVERY 6 HOURS PRN
Status: DISCONTINUED | OUTPATIENT
Start: 2019-04-16 | End: 2019-04-19 | Stop reason: HOSPADM

## 2019-04-16 RX ORDER — HYDROMORPHONE HYDROCHLORIDE 1 MG/ML
0.5 INJECTION, SOLUTION INTRAMUSCULAR; INTRAVENOUS; SUBCUTANEOUS
Status: DISCONTINUED | OUTPATIENT
Start: 2019-04-16 | End: 2019-04-16 | Stop reason: HOSPADM

## 2019-04-16 RX ORDER — NEOSTIGMINE METHYLSULFATE 1 MG/ML
INJECTION INTRAVENOUS AS NEEDED
Status: DISCONTINUED | OUTPATIENT
Start: 2019-04-16 | End: 2019-04-16 | Stop reason: SURG

## 2019-04-16 RX ORDER — IBUPROFEN 200 MG
16-32 TABLET ORAL AS NEEDED
Status: DISCONTINUED | OUTPATIENT
Start: 2019-04-16 | End: 2019-04-16 | Stop reason: HOSPADM

## 2019-04-16 RX ORDER — HYDROMORPHONE HYDROCHLORIDE 1 MG/ML
INJECTION, SOLUTION INTRAMUSCULAR; INTRAVENOUS; SUBCUTANEOUS AS NEEDED
Status: DISCONTINUED | OUTPATIENT
Start: 2019-04-16 | End: 2019-04-16 | Stop reason: SURG

## 2019-04-16 RX ORDER — OXYCODONE HYDROCHLORIDE 5 MG/1
5-10 TABLET ORAL EVERY 4 HOURS PRN
Start: 2019-04-16 | End: 2021-06-05

## 2019-04-16 RX ORDER — ONDANSETRON 4 MG/1
4 TABLET, ORALLY DISINTEGRATING ORAL EVERY 8 HOURS PRN
Status: DISCONTINUED | OUTPATIENT
Start: 2019-04-16 | End: 2019-04-16

## 2019-04-16 RX ORDER — DEXTROSE 40 %
15-30 GEL (GRAM) ORAL AS NEEDED
Status: DISCONTINUED | OUTPATIENT
Start: 2019-04-16 | End: 2019-04-16 | Stop reason: HOSPADM

## 2019-04-16 RX ORDER — DEXTROSE 40 %
15-30 GEL (GRAM) ORAL AS NEEDED
Status: CANCELLED | OUTPATIENT
Start: 2019-04-16

## 2019-04-16 RX ORDER — MIDAZOLAM HYDROCHLORIDE 2 MG/2ML
INJECTION, SOLUTION INTRAMUSCULAR; INTRAVENOUS AS NEEDED
Status: DISCONTINUED | OUTPATIENT
Start: 2019-04-16 | End: 2019-04-16 | Stop reason: SURG

## 2019-04-16 RX ORDER — DEXTROSE 50 % IN WATER (D50W) INTRAVENOUS SYRINGE
25 AS NEEDED
Status: DISCONTINUED | OUTPATIENT
Start: 2019-04-16 | End: 2019-04-16 | Stop reason: HOSPADM

## 2019-04-16 RX ORDER — SODIUM CHLORIDE 9 MG/ML
INJECTION INTRAMUSCULAR; INTRAVENOUS; SUBCUTANEOUS AS NEEDED
Status: DISCONTINUED | OUTPATIENT
Start: 2019-04-16 | End: 2019-04-16 | Stop reason: HOSPADM

## 2019-04-16 RX ORDER — SODIUM CHLORIDE 9 MG/ML
INJECTION, SOLUTION INTRAVENOUS CONTINUOUS
Status: DISPENSED | OUTPATIENT
Start: 2019-04-16 | End: 2019-04-17

## 2019-04-16 RX ORDER — INSULIN ASPART 100 [IU]/ML
6-10 INJECTION, SOLUTION INTRAVENOUS; SUBCUTANEOUS
Status: DISCONTINUED | OUTPATIENT
Start: 2019-04-16 | End: 2019-04-19 | Stop reason: HOSPADM

## 2019-04-16 RX ORDER — CELECOXIB 200 MG/1
400 CAPSULE ORAL
Status: COMPLETED | OUTPATIENT
Start: 2019-04-16 | End: 2019-04-16

## 2019-04-16 RX ORDER — DOFETILIDE 0.25 MG/1
250 CAPSULE ORAL 2 TIMES DAILY
Status: DISCONTINUED | OUTPATIENT
Start: 2019-04-16 | End: 2019-04-19 | Stop reason: HOSPADM

## 2019-04-16 RX ORDER — PROPOFOL 10 MG/ML
INJECTION, EMULSION INTRAVENOUS AS NEEDED
Status: DISCONTINUED | OUTPATIENT
Start: 2019-04-16 | End: 2019-04-16 | Stop reason: SURG

## 2019-04-16 RX ORDER — IBUPROFEN 200 MG
16-32 TABLET ORAL AS NEEDED
Status: CANCELLED | OUTPATIENT
Start: 2019-04-16

## 2019-04-16 RX ORDER — PHENYLEPHRINE HYDROCHLORIDE 10 MG/ML
INJECTION INTRAVENOUS AS NEEDED
Status: DISCONTINUED | OUTPATIENT
Start: 2019-04-16 | End: 2019-04-16 | Stop reason: SURG

## 2019-04-16 RX ORDER — CELECOXIB 200 MG/1
CAPSULE ORAL
Status: DISCONTINUED
Start: 2019-04-16 | End: 2019-04-19 | Stop reason: HOSPADM

## 2019-04-16 RX ORDER — ONDANSETRON HYDROCHLORIDE 2 MG/ML
INJECTION, SOLUTION INTRAVENOUS AS NEEDED
Status: DISCONTINUED | OUTPATIENT
Start: 2019-04-16 | End: 2019-04-16 | Stop reason: SURG

## 2019-04-16 RX ORDER — OXYCODONE HYDROCHLORIDE 5 MG/1
5-10 TABLET ORAL EVERY 4 HOURS PRN
Status: DISCONTINUED | OUTPATIENT
Start: 2019-04-16 | End: 2019-04-19 | Stop reason: HOSPADM

## 2019-04-16 RX ORDER — SODIUM CHLORIDE, SODIUM GLUCONATE, SODIUM ACETATE, POTASSIUM CHLORIDE AND MAGNESIUM CHLORIDE 30; 37; 368; 526; 502 MG/100ML; MG/100ML; MG/100ML; MG/100ML; MG/100ML
INJECTION, SOLUTION INTRAVENOUS CONTINUOUS PRN
Status: DISCONTINUED | OUTPATIENT
Start: 2019-04-16 | End: 2019-04-16 | Stop reason: SURG

## 2019-04-16 RX ORDER — LOSARTAN POTASSIUM 100 MG/1
100 TABLET ORAL EVERY EVENING
Status: DISCONTINUED | OUTPATIENT
Start: 2019-04-16 | End: 2019-04-19 | Stop reason: HOSPADM

## 2019-04-16 RX ORDER — DEXTROSE 50 % IN WATER (D50W) INTRAVENOUS SYRINGE
25 AS NEEDED
Status: DISCONTINUED | OUTPATIENT
Start: 2019-04-16 | End: 2019-04-19 | Stop reason: HOSPADM

## 2019-04-16 RX ORDER — WARFARIN 2.5 MG/1
2.5 TABLET ORAL
Status: DISCONTINUED | OUTPATIENT
Start: 2019-04-19 | End: 2019-04-17

## 2019-04-16 RX ORDER — AMOXICILLIN 250 MG
1 CAPSULE ORAL 2 TIMES DAILY
Qty: 60 TABLET | Refills: 0 | Status: SHIPPED | OUTPATIENT
Start: 2019-04-16 | End: 2021-06-07 | Stop reason: HOSPADM

## 2019-04-16 RX ORDER — HYDROMORPHONE HYDROCHLORIDE 1 MG/ML
0.5 INJECTION, SOLUTION INTRAMUSCULAR; INTRAVENOUS; SUBCUTANEOUS
Status: CANCELLED | OUTPATIENT
Start: 2019-04-16

## 2019-04-16 RX ORDER — DIPHENHYDRAMINE HCL 50 MG/ML
25 VIAL (ML) INJECTION EVERY 6 HOURS PRN
Status: DISCONTINUED | OUTPATIENT
Start: 2019-04-16 | End: 2019-04-19 | Stop reason: HOSPADM

## 2019-04-16 RX ORDER — DEXTROSE 50 % IN WATER (D50W) INTRAVENOUS SYRINGE
25 AS NEEDED
Status: CANCELLED | OUTPATIENT
Start: 2019-04-16

## 2019-04-16 RX ORDER — SODIUM CHLORIDE 9 MG/ML
INJECTION, SOLUTION INTRAVENOUS CONTINUOUS PRN
Status: DISCONTINUED | OUTPATIENT
Start: 2019-04-16 | End: 2019-04-16 | Stop reason: SURG

## 2019-04-16 RX ORDER — ONDANSETRON HYDROCHLORIDE 2 MG/ML
4 INJECTION, SOLUTION INTRAVENOUS
Status: CANCELLED | OUTPATIENT
Start: 2019-04-16

## 2019-04-16 RX ADMIN — TRANEXAMIC ACID 2200 MG: 100 INJECTION, SOLUTION INTRAVENOUS at 12:16

## 2019-04-16 RX ADMIN — KETOROLAC TROMETHAMINE 15 MG: 15 INJECTION, SOLUTION INTRAMUSCULAR; INTRAVENOUS at 16:40

## 2019-04-16 RX ADMIN — KETOROLAC TROMETHAMINE 15 MG: 15 INJECTION, SOLUTION INTRAMUSCULAR; INTRAVENOUS at 23:35

## 2019-04-16 RX ADMIN — MIDAZOLAM HYDROCHLORIDE 2 MG: 1 INJECTION, SOLUTION INTRAMUSCULAR; INTRAVENOUS at 11:56

## 2019-04-16 RX ADMIN — ONDANSETRON 4 MG: 2 INJECTION INTRAMUSCULAR; INTRAVENOUS at 12:18

## 2019-04-16 RX ADMIN — SODIUM CHLORIDE 2 G: 9 INJECTION, SOLUTION INTRAVENOUS at 12:05

## 2019-04-16 RX ADMIN — ACETAMINOPHEN 650 MG: 325 TABLET, FILM COATED ORAL at 20:35

## 2019-04-16 RX ADMIN — CELECOXIB 400 MG: 200 CAPSULE ORAL at 10:56

## 2019-04-16 RX ADMIN — Medication 2 G: at 20:31

## 2019-04-16 RX ADMIN — VANCOMYCIN HYDROCHLORIDE 1500 MG: 1 INJECTION, POWDER, LYOPHILIZED, FOR SOLUTION INTRAVENOUS at 11:00

## 2019-04-16 RX ADMIN — LOSARTAN POTASSIUM 100 MG: 100 TABLET, FILM COATED ORAL at 20:34

## 2019-04-16 RX ADMIN — EPHEDRINE SULFATE 10 MG: 50 INJECTION INTRAVENOUS at 12:51

## 2019-04-16 RX ADMIN — LIDOCAINE HYDROCHLORIDE 5 ML: 10 INJECTION, SOLUTION INFILTRATION; PERINEURAL at 12:04

## 2019-04-16 RX ADMIN — SENNOSIDES AND DOCUSATE SODIUM 1 TABLET: 8.6; 5 TABLET ORAL at 20:34

## 2019-04-16 RX ADMIN — ACETAMINOPHEN 650 MG: 325 TABLET, FILM COATED ORAL at 17:50

## 2019-04-16 RX ADMIN — FENTANYL CITRATE 50 MCG: 50 INJECTION, SOLUTION INTRAMUSCULAR; INTRAVENOUS at 12:56

## 2019-04-16 RX ADMIN — SODIUM CHLORIDE: 9 INJECTION, SOLUTION INTRAVENOUS at 20:36

## 2019-04-16 RX ADMIN — SODIUM CHLORIDE, SODIUM GLUCONATE, SODIUM ACETATE, POTASSIUM CHLORIDE AND MAGNESIUM CHLORIDE: 526; 502; 368; 37; 30 INJECTION, SOLUTION INTRAVENOUS at 13:12

## 2019-04-16 RX ADMIN — PHENYLEPHRINE HYDROCHLORIDE 100 MCG: 10 INJECTION INTRAVENOUS at 12:14

## 2019-04-16 RX ADMIN — PHENYLEPHRINE HYDROCHLORIDE 100 MCG: 10 INJECTION INTRAVENOUS at 12:19

## 2019-04-16 RX ADMIN — Medication 0.4 MG: at 13:12

## 2019-04-16 RX ADMIN — PHENYLEPHRINE HYDROCHLORIDE 100 MCG: 10 INJECTION INTRAVENOUS at 12:48

## 2019-04-16 RX ADMIN — DOFETILIDE 250 MCG: 0.25 CAPSULE ORAL at 20:35

## 2019-04-16 RX ADMIN — OXYCODONE HYDROCHLORIDE 10 MG: 5 TABLET ORAL at 20:30

## 2019-04-16 RX ADMIN — EPHEDRINE SULFATE 10 MG: 50 INJECTION INTRAVENOUS at 13:14

## 2019-04-16 RX ADMIN — ROSUVASTATIN CALCIUM 20 MG: 20 TABLET, FILM COATED ORAL at 17:51

## 2019-04-16 RX ADMIN — FENTANYL CITRATE 25 MCG: 50 INJECTION, SOLUTION INTRAMUSCULAR; INTRAVENOUS at 13:04

## 2019-04-16 RX ADMIN — WARFARIN SODIUM 5 MG: 5 TABLET ORAL at 17:50

## 2019-04-16 RX ADMIN — FENTANYL CITRATE 100 MCG: 50 INJECTION, SOLUTION INTRAMUSCULAR; INTRAVENOUS at 12:04

## 2019-04-16 RX ADMIN — INSULIN DETEMIR 45 UNITS: 100 INJECTION, SOLUTION SUBCUTANEOUS at 23:35

## 2019-04-16 RX ADMIN — HYDROMORPHONE HYDROCHLORIDE 0.5 MG: 1 INJECTION, SOLUTION INTRAMUSCULAR; INTRAVENOUS; SUBCUTANEOUS at 12:23

## 2019-04-16 RX ADMIN — SODIUM CHLORIDE: 900 INJECTION, SOLUTION INTRAVENOUS at 11:56

## 2019-04-16 RX ADMIN — PROPOFOL 160 MG: 10 INJECTION, EMULSION INTRAVENOUS at 12:04

## 2019-04-16 RX ADMIN — TRIMETHOBENZAMIDE HYDROCHLORIDE 200 MG: 100 INJECTION INTRAMUSCULAR at 16:35

## 2019-04-16 RX ADMIN — ROCURONIUM BROMIDE 50 MG: 10 INJECTION, SOLUTION INTRAVENOUS at 12:04

## 2019-04-16 RX ADMIN — PHENYLEPHRINE HYDROCHLORIDE 100 MCG: 10 INJECTION INTRAVENOUS at 12:01

## 2019-04-16 RX ADMIN — HYDROMORPHONE HYDROCHLORIDE 0.5 MG: 1 INJECTION, SOLUTION INTRAMUSCULAR; INTRAVENOUS; SUBCUTANEOUS at 12:30

## 2019-04-16 RX ADMIN — NEOSTIGMINE METHYLSULFATE 3 MG: 1 INJECTION INTRAVENOUS at 13:12

## 2019-04-16 RX ADMIN — CARVEDILOL 12.5 MG: 12.5 TABLET, FILM COATED ORAL at 20:35

## 2019-04-16 RX ADMIN — FENTANYL CITRATE 25 MCG: 50 INJECTION, SOLUTION INTRAMUSCULAR; INTRAVENOUS at 13:01

## 2019-04-16 RX ADMIN — ACETAMINOPHEN 975 MG: 325 TABLET, FILM COATED ORAL at 10:56

## 2019-04-16 ASSESSMENT — COGNITIVE AND FUNCTIONAL STATUS - GENERAL
CLIMB 3 TO 5 STEPS WITH RAILING: 3 - A LITTLE
WALKING IN HOSPITAL ROOM: 3 - A LITTLE
TOILETING: 3 - A LITTLE
HELP NEEDED FOR BATHING: 3 - A LITTLE
STANDING UP FROM CHAIR USING ARMS: 3 - A LITTLE
MOVING TO AND FROM BED TO CHAIR: 3 - A LITTLE
DRESSING REGULAR LOWER BODY CLOTHING: 2 - A LOT
DRESSING REGULAR UPPER BODY CLOTHING: 4 - NONE
EATING MEALS: 4 - NONE
HELP NEEDED FOR PERSONAL GROOMING: 4 - NONE

## 2019-04-16 ASSESSMENT — PAIN SCALES - GENERAL: PAIN_LEVEL: 3

## 2019-04-16 ASSESSMENT — ENCOUNTER SYMPTOMS: DYSRHYTHMIAS: 1

## 2019-04-16 NOTE — ANESTHESIA POSTPROCEDURE EVALUATION
Patient: Roberto Stiles    Procedure Summary     Date:  04/16/19 Room / Location:  [facility] OR 01 / [facility]    Anesthesia Start:  1157 Anesthesia Stop:  1328    Procedure:  KNEE ARTHROPLASTY TOTAL - Study Patient - Anesthesiologist performed (Left Knee) Diagnosis:       Osteoarthritis of left knee, unspecified osteoarthritis type      (O.A)    Surgeon:  Joshua Shafer MD Responsible Provider:  Geri Rivers MD    Anesthesia Type:  general ASA Status:  3          Anesthesia Type: general  PACU Vitals  4/16/2019 1322 - 4/16/2019 1422      4/16/2019 1326 4/16/2019 1330 4/16/2019 1334 4/16/2019 1340    BP: 139/65 139/65 - 139/72    Temp: 36.5 °C (97.7 °F) - - -    Pulse: 66 66 67 68    Resp: 18 (!)  25 (!)  21 (!)  22    SpO2: - 97 % 97 % 95 %              4/16/2019 1350 4/16/2019 1359 4/16/2019 1400 4/16/2019 1406    BP: 139/76 - 138/76 -    Temp: - - - -    Pulse: 69 68 68 68    Resp: (!)  23 (!)  25 - -    SpO2: 95 % 94 % 92 % 96 %              4/16/2019 1410 4/16/2019 1420          BP: (!)  152/82 (!)  150/79      Temp: - -      Pulse: 68 68      Resp: - -      SpO2: 95 % 98 %              Anesthesia Post Evaluation    Pain score: 3  Pain management: adequate  Patient location during evaluation: PACU  Patient participation: complete - patient participated  Level of consciousness: awake and alert  Cardiovascular status: acceptable  Airway Patency: adequate  Respiratory status: acceptable  Hydration status: acceptable  Anesthetic complications: no

## 2019-04-16 NOTE — ASSESSMENT & PLAN NOTE
Type 2 DM, on Synjardy, Ozempic, and Levemir  HgbA1C in pre op labs noted to be 7.2  Continue oral agents and Levemir  SSI while in hospital  Monitor accuchecks  NCS diet.  Sugars stable.

## 2019-04-16 NOTE — ASSESSMENT & PLAN NOTE
S/P Ablation for AF/VT. Follows with Dr. Teran, CCP  S/P VT ablation Jan 2019.  On Tikosyn, Coumadin and Coreg  INR 1.9 on 4/16 and went upto 2.4 on 4/17 - ortho is concerned about the sudden jump and yesterdays dose held-   INR is 2.2 yesterday and down to 1.6 today  Lytes wnl  ekg from yesterday- qtc is 522- avoid qt prolonging drugs- check rpt ekg from yesterday it is 425  Continue Coreg, Coumadin, and Tikosyn

## 2019-04-16 NOTE — PLAN OF CARE
Problem: Patient Care Overview  Goal: Plan of Care Review  Outcome: Ongoing (interventions implemented as appropriate)   04/16/19 1639   Coping/Psychosocial   Plan Of Care Reviewed With patient   Plan of Care Review   Progress progress toward functional goals as expected   Outcome Summary Pt will return to Ind w/ ADLs and functional transfers, functional mobility utilizing AD, DME and AE PRN     Goal: Individualization & Mutuality  Outcome: Ongoing (interventions implemented as appropriate)   04/16/19 1639   Individualization   Patient Specific Preferences Pt likes to be called [place]      04/16/19 1639   Individualization   Patient Specific Preferences Pt likes to be called [place]       Problem: Knee Arthroplasty (Total, Partial) (Adult)  Goal: Signs and Symptoms of Listed Potential Problems Will be Absent, Minimized or Managed (Knee Arthroplasty)  Outcome: Ongoing (interventions implemented as appropriate)      Problem: Acute Therapy Services Goal & Intervention Plan  Goal: Bed Mobility Goal  Outcome: Ongoing (interventions implemented as appropriate)    Goal: Lower Body Dressing Goal  Outcome: Ongoing (interventions implemented as appropriate)    Goal: Toilet Transfer Goal  Outcome: Ongoing (interventions implemented as appropriate)    Goal: Tub-Shower Transfer Goal  Outcome: Ongoing (interventions implemented as appropriate)

## 2019-04-16 NOTE — PROGRESS NOTES
Patient: Roberto Stiles  Location: [facility] 5PAV 5450  MRN: [id number]  Today's date: 4/16/2019  Falls alarm set w/ nursing present and aware of pt's status in room  Hospital Course  Sedrick is a 73 y.o. male admitted on 4/16/2019 with Osteoarthritis of left knee, unspecified osteoarthritis type [M17.12]  S/P total knee arthroplasty, left [Z96.652]. Principal problem is S/P total knee arthroplasty, left.    Sedrick has a past medical history of A-fib (CMS/HCC) (HCC); Arthritis; Bacterial endocarditis; Deep vein thrombosis (CMS/HCC) (HCC); History of transfusion; Hypertension; Kidney stones; Lipid disorder; Myocardial infarction (CMS/HCC) (HCC); SCC (squamous cell carcinoma); Sleep apnea; Type 2 diabetes mellitus (CMS/HCC) (HCC); and Venous insufficiency.          Therapy Pain/Vitals - 04/16/19 1532        Vital Signs    Pulse 69    SpO2 94 %    Patient Activity At rest    Oxygen Therapy Supplemental oxygen    O2 Delivery Method Nasal cannula    O2 Flow Rate (L/min) 2 L/min    BP (!)  158/81    BP Method Automatic    Patient Position Sitting          Prior Living Environment      Most Recent Value   Lives With  spouse   Living Arrangements  house   Living Environment Comment  multistory home, has first floor set up (basement), enter through the back, 2 EZEKIEL to enter, stall shower, 13 EZEKIEL to second floor   Equipment Currently Used at Home  none          Prior Level of Function      Most Recent Value   Ambulation  independent   Transferring  independent   Toileting  independent   Bathing  independent   Dressing  independent   Eating  independent   Communication  understands/communicates without difficulty   Swallowing  swallows foods/liquids without difficulty   Equipment Currently Used at Home  none   Prior Functional Level Comment  independent prior to surgery                OT Evaluation - 04/16/19 7576        Session Details    Document Type initial evaluation    Mode of Treatment occupational therapy        Time Calculation    Start Time 1546    Stop Time 1612    Time Calculation (min) 26 min       General Information    Patient Profile Reviewed? yes    Onset of Illness/Injury or Date of Surgery 04/16/19    Referring Physician Good    Existing Precautions/Restrictions weight bearing       Weight Bearing Status    Left LE Weight Bearing Status weight bearing as tolerated       Coping Strategies    Trust Relationship/Rapport care explained       Orientation Log    Comment AAOx3       Cognition/Psychosocial    Safety Awareness intact       Attention    Behavioral Observations WFL       Sensory    Sensory General Assessment no sensation deficits identified       Vision Assessment/Intervention    Visual Impairment/Limitations corrective lenses full time       Range of Motion (ROM)    General Range of Motion upper extremity range of motion deficits identified    Comment, General Range of Motion WFLs B UEs       Sit to Stand Transfer    Lane, Sit to Stand Transfer minimum assist (75% patient effort)    Verbal Cues safety;technique;hand placement       Stand to Sit Transfer    Lane, Stand to Sit Transfer 1 person assist    Verbal Cues --   vcs for safety, technique    Assistive Device walker, front-wheeled       Lower Body Dressing    Lower Body Dressing Self-Performance dons/doffs left sock;dons/doffs right sock    Lower Body Dressing Lane moderate assist (50-74% patient effort);1 person assist    Comment limited by weakness and stiffness s/p procedure       AM-PAC (TM) - ADL (Current Function)    Putting on and taking off regular lower body clothing? 2 - A Lot    Bathing? 3 - A Little    Toileting? 3 - A Little    Putting on/taking off regular upper body clothing? 4 - None    How much help for taking care of personal grooming? 4 - None    Eating meals? 4 - None    AM-PAC (TM) ADL Score 20       OT Clinical Impression    Patient's Goals For Discharge return to all previous roles/activities    Family  Goals For Discharge patient able to return to all previous activities/roles    Plan For Care Reviewed: Occupational Therapy OT plan for care discussed with patient    System Pathology/Pathophysiology Noted musculoskeletal;neuromuscular    Impairments Found (OT Eval) aerobic capacity/endurance;ergonomics and body mechanics;motor function;muscle performance    Functional Limitations in Following Categories self-care;home management    Disability: Inability to Perform Actions/Activities of Required Roles (OT) community/leisure    Activity tolerance compared to PLOF Participating in 50-75% of school/work/household responsibilities and leisure activities, with accommodations, minimal extracurricular activities    Rehab Potential/Prognosis: Occupational Therapy good, to achieve stated therapy goals    OT Frequency of Treatment 3-5 times per week    Estimated Length of Stay 1 week    Problem List: Occupational Therapy ROM decreased;motor control impaired;strength decreased    Activity Limitations Related to Problem List BADL activities not performed adequately or safely;ambulation not performed safely    Anticipated Equipment Needs at Discharge bathing equipment;toileting equipment;bedside commode;dressing equipment;front wheeled walker;shower chair    Daily Outcome Statement Pt will benefit from A x1 for lower body ADls and functional transfers, functional mobility                        Education provided this session. See the Patient Education summary report for full details.    OT Care Plan Goals      Most Recent Value   Bed Mobility Goal   Date Goal Established: Bed Mobility  04/16/19   Time to Achieve Goal: Bed Mobility  5 - 7 days   Goal Activity: Bed Mobility  all bed mobility activities   Level of [place] Goal: Bed Mobility  modified independence   Goal Outcome: Bed Mobility  goal ongoing   Lower Body Dressing Goal   Time to Achieve Goal: Lower Body Dressing  5 - 7 days   Level of [place]  modified  independence   Goal Outcome: Lower Body Dressing  goal ongoing   Toilet Transfer Goal   Time to Achieve Goal: Toilet Transfer Training  5 - 7 days   Level of [place] Goal: Toilet Transfer Training  modified independence   Assistive Device: Toilet Transfer Training  raised toilet seat, grab bars/safety frame, walker, front-wheeled   Goal Outcome: Toilet Transfer Training  goal ongoing   Transfer Goal   Date Goal Established: Transfer Training  04/16/19   Time to Achieve Goal: Transfer Training  5 - 7 days   Goal Activity: Transfer Training  sit to stand/stand to sit   Level of [place] Goal: Transfer Training  modified independence   Assistive Device: Transfer Training  walker, front-wheeled   Goal Outcome: Transfer Training  goal ongoing   Tub-Shower Transfer Goal   Time to Achieve Goal: Tub-Shower Transfer Training  5 - 7 days   Level of [place] Goal: Tub-Shower Transfer Training  modified independence   Assistive Device: Tub-Shower Transfer Training  grab bars/tub rail, walker, front-wheeled, shower chair   Goal Outcome: Tub-Shower Transfer Training  goal ongoing

## 2019-04-16 NOTE — PROGRESS NOTES
Orthopedic Post Surgical Note    72 y/o male s/p left total knee arthroplasty    Physical Exam:  - dressing clean, dry, intact with mepilex to left knee  - sensation intact to light touch  - palpable DP, PT pulses  - active dorsiflexion, plantarflexion, extensor hallucis longus    A/P:   - pain control  - physical therapy/occupational therapy  - DVT prophylaxis  .

## 2019-04-16 NOTE — PERIOPERATIVE NURSING NOTE
Spoke to Zora Villalobos, magnet can be placed on pacemaker during surgery and after the surgery remove magnet. No interrogation needed after magnet removed.

## 2019-04-16 NOTE — CONSULTS
Hospital Medicine Service -  Daily Progress Note       SUBJECTIVE   Interval History: Pt seen and examined in PACU and resting comfortably. Denies any pain as of yet in knee. Denies ha, cp, sob, dizziness, abd pain, n/v or any other symptoms.   Records in chart note that pt was cleared by his cardiologist, Dr. Teran, of [facility].     OBJECTIVE      Vital signs in last 24 hours:  Temp:  [36.5 °C (97.7 °F)-37 °C (98.6 °F)] 36.5 °C (97.7 °F)  Heart Rate:  [66-76] 66  Resp:  [16-25] 25  BP: (124-139)/(65-76) 139/65    Intake/Output Summary (Last 24 hours) at 04/16/19 1349  Last data filed at 04/16/19 1312   Gross per 24 hour   Intake             1000 ml   Output                0 ml   Net             1000 ml       PHYSICAL EXAMINATION      Physical Exam   Constitutional: He is oriented to person, place, and time. He appears well-developed and well-nourished.   HENT:   Head: Normocephalic.   Eyes: Conjunctivae and EOM are normal.   Neck: Normal range of motion. Neck supple.   Cardiovascular: Normal rate, regular rhythm, normal heart sounds and intact distal pulses.    Pulmonary/Chest: Effort normal and breath sounds normal. No respiratory distress.   Abdominal: Soft. Bowel sounds are normal. He exhibits no distension. There is no tenderness.   Musculoskeletal:   L knee dressing clean, dry and intact. DP pulses intact   Neurological: He is alert and oriented to person, place, and time.   Skin: Skin is warm and dry. Capillary refill takes less than 2 seconds.   Psychiatric: He has a normal mood and affect. His behavior is normal.   Vitals reviewed.     LINES, CATHETERS, DRAINS, AIRWAYS, AND WOUNDS   Lines, Drains, Airways, Wounds:  Peripheral IV 04/16/19 Left Hand (Active)   Number of days: 0       Surgical Incision Knee Left (Active)   Number of days: 0       Comments: L knee dressing in place     LABS / IMAGING / TELE      Labs  Pre op labs reviewed fr 4/2. HgbA1C 7.2.   INR 4/16 1.9    Imaging  n/a    ECG/Telemetry  I  have independently reviewed the telemetry. Significant findings include SR in 70's on PACU tele.    ASSESSMENT AND PLAN      * S/P total knee arthroplasty, left   Assessment & Plan    S/P L total knee, POD# 0  Pain control per orthopedics  DVT ppx per orthopedics  PT/OT  Encourage IS  Bowel regimen  Follow post op labs     History of heart valve replacement with bioprosthetic valve   Assessment & Plan    HO Raine Shiley mechanical MV  Records show stable on recent JOSELIN w/ nml valve function. Record indicated EF 50%       Atrial fibrillation (CMS/HCC)   Assessment & Plan    S/P Ablation for AF/VT. Follows with Dr. Teran, [facility]  S/P VT ablation Jan 2019.  On Tikosyn, Coumadin and Coreg  INR today 1.9.  Will need remote tele monitoring  Daily INR  Monitor lytes closely, including Mg, and replete prn  Continue Coreg, Coumadin, and Tikosyn     Essential hypertension   Assessment & Plan    On Coreg, Lasix and Cozaar  HOLD Lasix  Continue Coreg and Cozaar  Will monitor BP closely     Type 2 diabetes mellitus, with long-term current use of insulin (CMS/[facility])   Assessment & Plan    Type 2 DM, on Synjardy, Ozempic, and Levemir  HgbA1C in pre op labs noted to be 7.2  Continue oral agents and Levemir  SSI while in hospital  Monitor accuchecks  NCS diet     HLD (hyperlipidemia)   Assessment & Plan    HOLD Fish oil for now  Continue statin     SANA on CPAP   Assessment & Plan    Reportedly on CPAP at home at night  Continue CPAP qhs per home settings  Monitor on SANA protocol          VTE Assessment: Padua    VTE Prophylaxis Plan: As per ortho,Coumadin  Disposition Planning: As per care of ortho     VANCE Mcdonald  4/16/2019

## 2019-04-16 NOTE — ANESTHESIA PROCEDURE NOTES
Airway  Urgency: elective    Start Time: 4/16/2019 12:08 PM  Airway not difficult    General Information and Staff    Patient location during procedure: OR  Anesthesiologist: BRITNEY WEST  Resident/CRNA: ЕЛЕНА MARCOS  Performed: resident/CRNA     Indications and Patient Condition  Indications for airway management: anesthesia  Sedation level: deep  Preoxygenated: yes  Patient position: sniffing  MILS not maintained throughout  Mask difficulty assessment: 1 - vent by mask    Final Airway Details  Final airway type: endotracheal airway      Successful airway: ETT  Cuffed: yes   Successful intubation technique: video laryngoscopy (elective glidescope)  Facilitating devices/methods: intubating stylet  Endotracheal tube insertion site: oral  Blade: Harshil  Blade size: #3  ETT size: 8.0 mm  Cormack-Lehane Classification: grade I - full view of glottis  Placement verified by: chest auscultation and capnometry   Measured from: lips  Number of attempts at approach: 1  Ventilation between attempts: none  Number of other approaches attempted: 0  Atraumatic airway insertion      Additional Comments  Elective glidescope used, vocal cords seen in center of glidescope screen

## 2019-04-16 NOTE — HOSPITAL COURSE
Sedrick is a 73 y.o. male admitted on 4/16/2019 with Osteoarthritis of left knee, unspecified osteoarthritis type [M17.12]  S/P total knee arthroplasty, left [Z96.652]. Principal problem is S/P total knee arthroplasty, left.    Sedrick has a past medical history of A-fib (CMS/HCC) (HCC); Arthritis; Bacterial endocarditis; Deep vein thrombosis (CMS/HCC) (HCC); History of transfusion; Hypertension; Kidney stones; Lipid disorder; Myocardial infarction (CMS/HCC) (HCC); SCC (squamous cell carcinoma); Sleep apnea; Type 2 diabetes mellitus (CMS/HCC) (HCC); and Venous insufficiency.

## 2019-04-16 NOTE — PLAN OF CARE
Problem: Patient Care Overview  Goal: Plan of Care Review  Outcome: Ongoing (interventions implemented as appropriate)   04/16/19 1602   Coping/Psychosocial   Plan Of Care Reviewed With patient;spouse   Plan of Care Review   Progress progress toward functional goals as expected   Outcome Summary PT evaluation completed     Goal: Individualization & Mutuality  Outcome: Ongoing (interventions implemented as appropriate)      Problem: Knee Arthroplasty (Total, Partial) (Adult)  Goal: Signs and Symptoms of Listed Potential Problems Will be Absent, Minimized or Managed (Knee Arthroplasty)  Outcome: Ongoing (interventions implemented as appropriate)      Problem: Acute Therapy Services Goal & Intervention Plan  Goal: Bed Mobility Goal  Outcome: Ongoing (interventions implemented as appropriate)    Goal: Gait Training Goal  Outcome: Ongoing (interventions implemented as appropriate)    Goal: Physical Therapy Goal  Outcome: Ongoing (interventions implemented as appropriate)    Goal: Stairs Goal  Outcome: Ongoing (interventions implemented as appropriate)    Goal: Transfer Training Goal  Outcome: Ongoing (interventions implemented as appropriate)

## 2019-04-16 NOTE — PROGRESS NOTES
Patient: Roberto Stiles  Location: [facility] 5PAV 5450  MRN: [id number]  Today's date: 4/16/2019     Pt left seated in recliner, posey alarm, call bell within reach, nsg in room     Hospital Course  Sedrick is a 73 y.o. male admitted on 4/16/2019 with Osteoarthritis of left knee, unspecified osteoarthritis type [M17.12]  S/P total knee arthroplasty, left [Z96.652]. Principal problem is S/P total knee arthroplasty, left.    Sedrick has a past medical history of A-fib (CMS/HCC) ([facility]); Arthritis; Bacterial endocarditis; Deep vein thrombosis (CMS/HCC) ([facility]); History of transfusion; Hypertension; Kidney stones; Lipid disorder; Myocardial infarction (CMS/HCC) ([facility]); SCC (squamous cell carcinoma); Sleep apnea; Type 2 diabetes mellitus (CMS/HCC) ([facility]); and Venous insufficiency.          Therapy Pain/Vitals     Row Name 04/16/19 1530 04/16/19 1531 04/16/19 1532       Pain/Comfort/Sleep    Pain Charting Type Pain Assessment  --  --    Presence of Pain denies  --  --    Preferred Pain Scale number (Numeric Rating Pain Scale)  --  --    Pain Body Location - Side Left  --  --    Pain Body Location knee  --  --    Pain Rating (0-10): Rest 0  --  --    Pain Rating (0-10): Activity 2  --  --    Pain Management Interventions position adjusted  --  --       Vital Signs    Pulse 65  -- 69    SpO2 93 %  -- 94 %    Patient Activity At rest  -- At rest    Oxygen Therapy Supplemental oxygen  -- Supplemental oxygen    O2 Delivery Method Nasal cannula  -- Nasal cannula    O2 Flow Rate (L/min) 2 L/min  -- 2 L/min    BP (!)  155/72 (!)  161/74 (!)  158/81    BP Method Automatic Automatic Automatic    Patient Position Lying Sitting Sitting          Prior Living Environment      Most Recent Value   Lives With  spouse   Living Arrangements  house   Living Environment Comment  multistory home, has first floor set up (basement), enter through the back, 2 EZEKIEL to enter, stall shower, 13 EZEKIEL to second floor   Equipment Currently Used at Home   none          Prior Level of Function      Most Recent Value   Ambulation  independent   Transferring  independent   Toileting  independent   Bathing  independent   Dressing  independent   Eating  independent   Communication  understands/communicates without difficulty   Swallowing  swallows foods/liquids without difficulty   Equipment Currently Used at Home  none   Prior Functional Level Comment  independent prior to surgery                PT Evaluation - 04/16/19 1530        Session Details    Document Type initial evaluation    Mode of Treatment physical therapy    Patient/Family Observations pt received supine in bed       Time Calculation    Start Time 1530    Stop Time 1555    Time Calculation (min) 25 min       General Information    Patient Profile Reviewed? yes    Onset of Illness/Injury or Date of Surgery 04/16/19    Referring Physician Good    Existing Precautions/Restrictions weight bearing       Weight Bearing Status    Left LE Weight Bearing Status weight bearing as tolerated       Orientation Log    Comment AAO x3       Cognition/Psychosocial    Safety Awareness intact       Attention    Behavioral Observations WFL       Sensory    Sensory General Assessment no sensation deficits identified       Range of Motion (ROM)    General Range of Motion lower extremity range of motion deficits identified       ROM: Left Knee    AROM Deficit: Extension/Flexion 15-75*       Manual Muscle Testing (MMT)    General MMT Assessment lower extremity strength deficits identified    Comment B/L ankle DF/PF 4/5, active left LAQ       Bed Mobility    Bed Mobility supine to sit    [place], Supine to Sit close supervision    Assistive Device (Bed Mobility) bed rails;draw sheet;head of bed elevated       Transfers    Maintains Weight Bearing Status (Transfers) able to maintain weight bearing status       Sit to Stand Transfer    [place], Sit to Stand Transfer close supervision;verbal cues    Verbal Cues  safety;proper use of assistive device    Assistive Device walker, front-wheeled       Stand to Sit Transfer    [place], Stand to Sit Transfer close supervision;verbal cues    Verbal Cues proper use of assistive device;safety    Assistive Device walker, front-wheeled       Gait Analysis/Training    Gait/Stairs Locomotion Gait Training (Group)       Gait Training    [place], Gait close supervision    Assistive Device walker, front-wheeled    Distance in Feet 50 feet    Gait Pattern Utilized step-to    Gait Deviations Identified decreased brenden;decreased gait speed;left;antalgic    Maintains Weight Bearing Status able to maintain weight bearing status    Comment cues for step sequencing with AD, 2/10 pain level, VSS       Stairs Training    [place], Stairs not tested       AM-PAC (TM) - Mobility (Current Function)    Turning from your back to your side while in a flat bed without using bedrails? 3 - A Little    Moving from lying on your back to sitting on the side of a flat bed without using bedrails? 3 - A Little    Moving to and from a bed to a chair? 3 - A Little    Standing up from a chair using your arms? 3 - A Little    To walk in a hospital room? 3 - A Little    Climbing 3-5 steps with a railing? 3 - A Little    AM-PAC (TM) Mobility Score 18       PT Clinical Impression    Patient's Goals For Discharge return to all previous roles/activities    Plan For Care Reviewed: Physical Therapy PT plan for care discussed with patient;PT plan for care discussed with family    System Pathology/Pathophysiology Noted musculoskeletal    Impairments Found (PT Eval) aerobic capacity/endurance;gait, locomotion, and balance;motor function;muscle performance;ROM (range of motion);joint integrity and mobility    Functional Limitations in Following Categories (PT Eval) home management;community/leisure    Rehab Potential/Prognosis good, to achieve stated therapy goals    PT Frequency of Treatment 5-7 times per week     Problem List decreased strength;decreased ROM;decreased flexibility;abnormal muscle tone;pain    Activity Limitations Related to Problem List BADL activities not performed adequately or safely    Anticipated Equipment Needs at Discharge front wheeled walker    PT Recommended Discharge Disposition home with outpatient services    Daily Outcome Statement 4/16 Pt is a 73 year old female s/p left TKA post operative day zero; pt ambulated 50ft, VSS, anticipate DC home; has OP PT appt scheduled           Pain Assessment/Intervention  Pain Charting Type: Pain Assessment             Education provided this session. See the Patient Education summary report for full details.    PT Care Plan Goals      Most Recent Value   Stair Goal, PT   PT STG: Stairs  modified independence   PT STG: Number of Stairs  4   PT STG Assistive Device: Stairs  cane, straight   PT STG Date Established: Stairs  04/16/19   PT STG Duration: Stairs  7 days or less   PT STG Outcome: Stairs  goal ongoing      PT Care Plan Goals      Most Recent Value   Bed Mobility Goal   Date Goal Established: Bed Mobility  04/16/19   Time to Achieve Goal: Bed Mobility  5 - 7 days   Goal Activity: Bed Mobility  sit to supine/supine to sit   Level of [place] Goal: Bed Mobility  modified independence   Goal Outcome: Bed Mobility  goal ongoing   Gait Goal   Date Goal Established: Gait Training  04/16/19   Time to Achieve Goal: Gait Training  5 - 7 days   Level of [place]  modified independence   Assistive Device: Gait Training  walker, front-wheeled   Distance Goal: Gait Training (feet)  150 feet   Goal Outcome: Gait Training  goal ongoing   Transfer Goal   Date Goal Established: Transfer Training  04/16/19   Time to Achieve Goal: Transfer Training  5 - 7 days   Goal Activity: Transfer Training  sit to stand/stand to sit   Level of [place] Goal: Transfer Training  modified independence   Assistive Device: Transfer Training  walker, front-wheeled   Goal  Outcome: Transfer Training  goal ongoing   Physical Therapy Goal   Date Goal Established: Physical Therapy  04/16/19   Time to Achieve Goal: Physical Therapy  5 - 7 days   Goal Activity: Physical Therapy  Pt will perform a car transfer   Level of [place] Goal: Physical Therapy  modified independence   Goal Outcome: Physical Therapy  goal ongoing

## 2019-04-16 NOTE — OP NOTE
Operative Report    Date of procedure: 4/16/2019    Pre-Op Diagnosis: Primary degenerative arthritis left knee    Post-Op Diagnosis: Same    Procedure: Total knee arthroplasty left using a [employer] attune posterior stabilized total knee prosthesis.  Size 8 left femur, and 8 tibia, and 8 polyethylene, 41 mm 3 post oval patella.    Surgeon:  Joshua Shafer MD    Assistant: Tonia Varma PA-C    Anesthesia: General    Estimated Blood Loss:  Minimal    Fluids Replaced: 2000 cc saline    Complications:  None    Specimen: Resected surfaces of joint    Findings: Severe tricompartmental arthritic changes    Description of Procedure:    After the induction of anesthesia and appropriate timeout was performed to confirm the side and site of surgery.  The leg was elevated scrubbed with ChloraPrep and draped in the usual manner.  The limb was exsanguinated with an Esmarch bandage and the tourniquet inflated to 275 mmHg.  A vertical anteromedial incision was performed extending from the medial aspect of the superior pole the patella to the medial aspect of the tibial tubercle.  It was taken down through the subcutaneous tissue.  Bleeding was controlled with electrocautery.  A mid vastus arthrotomy was performed medially and the retropatellar fat pad was excised.  The proximal medial tibia was exposed in a subperiosteal manner.  Attention was then directed to the patella which was sized and prepared in the usual manner.  The anterior and posterior cruciate ligaments were then excised.  The tibia was dislocated anteriorly.  The remnants of the medial and lateral menisci were debrided.   An extra medullary alignment guide was used to resect the articular surface of the tibia perpendicular to the long axis of the tibia. Osteophytes were debrided.  Sizing was then performed and the tibia was prepared.  Attention was then directed to the femur where an intramedullary alignment guide was used to resect the distal femur.  The  anterior posterior sizing guide was used to select the appropriate  4-in-1 cutting guide.  This was then used to resect the remaining portions of the distal femur.  The flexion and extension gaps were assessed and found to be balanced and symmetrical in both flexion and extension.  The soft tissue was infiltrated half percent ropivacaine.  The box cutting guide was used to resect the intercondylar notch region.  Trial reduction was then performed using the above-mentioned sizes.  This gave excellent alignment, good stability in flexion and extension, and excellent tracking of the patella.    The trial components were then removed.  The wound was thoroughly irrigated with a solution of saline.  2 packages of methylmethacrylate cement were mixed and the above-mentioned components were cemented into position.  The excess cement was debrided.  The knee was then articulated and found to be stable in flexion and extension with excellent patellar tracking and full range of motion.    After thorough irrigation the arthrotomy was closed with a running #2 Quill.  The subcutaneous tissue was closed with interrupted 2-0 Vicryl.  The skin was closed with 3-0 Monocryl in a subcuticular manner compression dressings applied and the tourniquet was deflated.    Tonia Varma PA-C, first assisted in the operation as no resident was available for the case.  She helped position the patient, first assisted throughout the surgery, aided in closing, and returning the patient to the postanesthesia care unit.    Patient tolerated procedure well in the operating room in satisfactory condition.  I was present and scrubbed through all the major components of the operation.    Joshua Shafer MD

## 2019-04-16 NOTE — ASSESSMENT & PLAN NOTE
HO Raine Shiley mechanical MV  Records show stable on recent JOSELIN w/ nml valve function. Record indicated EF 50%.  inr goal is 2.5-3.5-as outpatient  Discussed with Ortho they want to keep the INR between 2 and 2.5 to avoid bleeding

## 2019-04-16 NOTE — ANESTHESIA PREPROCEDURE EVALUATION
Anesthesia ROS/MED HX      Pulmonary    Sleep apnea and CPAP Compliant  Cardiovascular   Valvular problems/murmurs (MVReplaced)  Pacemaker   hypertension  Dysrhythmias (Afib, Vfib)  AICD   ECG reviewed and cardiac clearance reviewed  Hematological    thrombocytopenia   anticoagulants   coagulopathy (coumadin , INR 1.9, stoped Thuersday, Lovenox tx)  Musculoskeletal   Osteoarthritis  Endo/Other   Diabetes  Body Habitus: Obese      Past Surgical History:   Procedure Laterality Date   • CARDIAC ELECTROPHYSIOLOGY MAPPING AND ABLATION     • CARDIAC SURGERY     • KIDNEY STONE SURGERY     • MITRAL VALVE REPLACEMENT     • TONSILLECTOMY         Physical Exam    Airway   Mallampati: II   TM distance: >3 FB   Neck ROM: full  Cardiovascular - normal   Rhythm: regular   Rate: normal  Pulmonary - normal   clear to auscultation  Dental - normal        Anesthesia Plan    Plan: general    Technique: general endotracheal     Lines and Monitors: PIV     Airway: oral intubation   ASA 3  Blood Products:     Use of Blood Products Discussed: Yes   Anesthetic plan and risks discussed with: patient  Induction:    intravenous   Postop Plan:   Patient Disposition: inpatient floor planned admission   Pain Management: IV analgesics

## 2019-04-16 NOTE — ASSESSMENT & PLAN NOTE
Reportedly on CPAP at home at night  Continue CPAP qhs per home settings  Monitor on SANA protocol.

## 2019-04-16 NOTE — ASSESSMENT & PLAN NOTE
S/P L total knee, POD# 3  Pain control per orthopedics  DVT ppx per orthopedics  PT/OT  Encourage IS  Bowel regimen  Labs stable

## 2019-04-16 NOTE — H&P (VIEW-ONLY)
[facility] Medicine Service -  Pre-Operative Consultation       Patient Name: Roberto Stiles  Referring Surgeon: Dr. Shafer    Reason for Referral: Pre-Operative Evaluation  Surgical Procedure: L TKA  Operative Date: 4/16/19  Other Providers:      PCP: Willa Ambrocio MD        HISTORY OF PRESENT ILLNESS      Roberto Stiles is a 73 y.o. male presenting today to the [facility] at [facility] for pre-operative evaluation prior to planned surgery.    Patient has a PMH of bacterial endocarditis, DVT, HTN, mitral valve replacement, SANA on CPAP, atrial fibrillation,  kidney stones, HLD, MI, and DM.    The patient denies any current or recent chest pain or pressure, dyspnea, cough, sputum, fevers, chills, abdominal pain, nausea, vomiting, diarrhea or other symptoms. Functionally, the patient is able to ascend a flight or so of stairs with no dyspnea or chest pain.     The patient is scheduled to see his Cardiologist Dr. Teran this coming Monday. He states he is compliant with all of his cardiac medications. Patient has a significant history of Vtach and afib requiring ablation x3. He has a PPM/defibrillator that has been replaced once. He had his mitral valve replaced many years ago.     He also is on multiple medications for DM and is compliant with his regimen. His last A1c was 8 in November and he is anxious about his recheck today.    As far as his knee, he has had chronic pain for approximately 10 years ago. He had a fall where he landed on his knee this past January, which acutely worsened his arthritis and pain. He has been wearing a knee brace which has provided relief. Prior to this he was receiving cortisone shots.    The patient denies, on specific questioning, the following:  No history of CHF.  No history of PE.  No history of COPD.  No history of CVA.   No history of CKD.     PAST MEDICAL AND SURGICAL HISTORY      Past Medical History:   Diagnosis Date   • A-fib  (CMS/HCC) (HCC)    • Arthritis     OA   • Bacterial endocarditis    • Deep vein thrombosis (CMS/HCC) (HCC)     LLE over 30 years ago   • History of transfusion     for MV replacement x34 years ago   • Hypertension    • Kidney stones    • Lipid disorder    • Myocardial infarction (CMS/HCC) (HCC)    • SCC (squamous cell carcinoma)     right ear   • Sleep apnea     wears CPAP   • Type 2 diabetes mellitus (CMS/HCC) (HCC)     last A1C 11/2018   • Venous insufficiency        Past Surgical History:   Procedure Laterality Date   • CARDIAC ELECTROPHYSIOLOGY MAPPING AND ABLATION     • CARDIAC SURGERY     • MITRAL VALVE REPLACEMENT     • TONSILLECTOMY         MEDICATIONS        Current Outpatient Prescriptions:   •  cholecalciferol, vitamin D3, (VITAMIN D3) 1,000 unit capsule, Take 1,000 Units by mouth daily., Disp: , Rfl:   •  FISH OIL-omega-3 fatty acids (FISH OIL) 340-1,000 mg capsule, Take 1 capsule by mouth 2 (two) times a day., Disp: , Rfl:   •  multivitamin tablet, Take by mouth daily., Disp: , Rfl:   •  NOT IN DATABASE, Prevagen 10mg daily, Disp: , Rfl:   •  warfarin (COUMADIN) 5 mg tablet, Take 5 mg by mouth every evening. Mon, Tues, Wed, Thurs Sat and Sun, Disp: , Rfl:   •  aspirin 81 mg enteric coated tablet, Take 81 mg by mouth daily., Disp: , Rfl:   •  carvedilol (COREG) 12.5 mg tablet, Take 12.5 mg by mouth 2 (two) times a day with meals., Disp: , Rfl:   •  dofetilide (TIKOSYN) 250 mcg capsule, Take 250 mcg by mouth 2 (two) times a day., Disp: , Rfl:   •  empagliflozin-metformin (SYNJARDY) 12.5-500 mg tablet, Take 1 tablet by mouth 2 (two) times a day., Disp: , Rfl:   •  furosemide (LASIX) 20 mg tablet, Take 20 mg by mouth daily. As needed for leg swelling, Disp: , Rfl:   •  insulin detemir U-100 (LEVEMIR) 100 unit/mL (3 mL) subcutaneous pen, Inject 45 Units under the skin nightly., Disp: , Rfl:   •  losartan (COZAAR) 100 mg tablet, Take 100 mg by mouth every evening.  , Disp: , Rfl:   •  potassium chloride  (KLOR-CON) 20 mEq CR tablet, Take 20 mEq by mouth daily., Disp: , Rfl:   •  rosuvastatin (CRESTOR) 20 mg tablet, Take 20 mg by mouth every evening.  , Disp: , Rfl:   •  semaglutide (OZEMPIC) 1 mg/0.75 mL (2 mg/1.5 mL) pen injector, Inject 1 mg/mL under the skin once a week. Patient takes on SUNday, Disp: , Rfl:   •  warfarin (COUMADIN) 2.5 mg tablet, Take 2.5 mg by mouth once a week. Friday only , Disp: , Rfl:     ALLERGIES      Patient has no known allergies.    FAMILY HISTORY      family history is not on file.    Denies any prior known family history of DVTs/PEs/clotting disorder    SOCIAL HISTORY      Social History   Substance Use Topics   • Smoking status: Never Smoker   • Smokeless tobacco: Never Used   • Alcohol use 1.8 oz/week     3 Shots of liquor per week       REVIEW OF SYSTEMS      All other systems reviewed and negative except as noted in HPI    PHYSICAL EXAMINATION      /60 (BP Location: Right upper arm, Patient Position: Sitting)   Pulse 76   Temp 36.9 °C (98.4 °F) (Temporal)   Resp 18   Ht 1.829 m (6')   Wt 111 kg (244 lb)   SpO2 97%   BMI 33.09 kg/m²   Body mass index is 33.09 kg/m².    Physical Exam  General: NAD, calm, pleasant  HEENT: atraumatic  Cardiovascular: RRR, systolic click; S1/S2+  Pulmonary: CTAB; no rales, rhonchi or wheezing  Gi: Soft, nontender, nondistended  Ext: trace pedal edema; bilateral lower extremity discoloration  Neuro: Alert and oriented x3; no sensory or motor deficits  Psych: Calm, appropriate answers    LABS / EKG        Labs    Results from last 7 days  Lab Units 04/02/19  1040   WBC K/uL 5.17   HEMOGLOBIN g/dL 15.6   HEMATOCRIT % 44.8   PLATELETS K/uL 148*       Results from last 7 days  Lab Units 04/02/19  1040   SODIUM mEQ/L 136   POTASSIUM mEQ/L 4.7   CHLORIDE mEQ/L 104   CO2 mEQ/L 18*   BUN mg/dL 25*   CREATININE mg/dL 1.1   GLUCOSE mg/dL 152*   CALCIUM mg/dL 9.1       Results from last 7 days  Lab Units 04/02/19  1040   HEMOGLOBIN A1C % 7.2*        Lab Results   Component Value Date     07/06/2015    K 4.5 07/06/2015     (H) 07/06/2015    BUN 22 (H) 07/06/2015    CREATININE 1.3 07/06/2015    WBC 5.01 08/10/2018    HGB 14.1 08/10/2018    HCT 41.3 08/10/2018     (L) 08/10/2018    ALT 30 07/06/2015    AST 24 07/06/2015    INR 2.2 08/10/2018    HGBA1C 11.3 (H) 06/02/2015         ECG/Telemetry  I have independently reviewed the ECG. Significant findings include v-paced rhythm.    ASSESSMENT AND PLAN         Preoperative examination  -Patient has no cardiac symptoms.   -Patient exhibits no signs/symptoms of angina, arrythmia or decompenstated CHF.   -Patient does not have new significant EKG changes  -Patient has been advised to avoid NSAIDs 5-7 days prior to surgery  -He must receive clearance from his Cardiologist Dr. Teran prior to surgery. Will need recommendations from him regarding his ASA, coumadin, and fish oil  -Will also need to check a HbA1c prior to surgery    Type 2 diabetes mellitus, with long-term current use of insulin (CMS/[facility])  -Last Hba1c 8 in November, 7.2 today  -Continue Levemir, empaglifozin-metformin, and Ozempic  -Patient will need to take 1/2 dose of Levemir prior to surgery  -He should hold his empaglifozin-metformin on day of surgery. OK to take Ozempic on Sunday prior to surgery    SANA on CPAP  -Continue CPAP nightly  -Instructed to bring machine on day of surgery    Essential hypertension  -Continue Coreg and Losartan  -Hold Losartan on morning of surgery  -Continue to monitor pressures    Atrial fibrillation (CMS/[facility])  -S/P history of ablation (for afib and Vtach)  -Continue ASA, Coreg, Tikosyn, Coumadin  -Need recommendations from Dr. Teran regarding when to hold ASA/ coumadin prior to surgery  -Will need telemetry at all times perioperatively due to Tikosyn. Should also check BMP/mag/phos daily due to this medication    HLD (hyperlipidemia)  -Continue statin and fish oil  -Will need to hold fish oil at  least 10 days prior to surgery       In regards to perioperative cardiac risk:  The patient has a history of ischemic heart disease, denies any history of CHF, denies any history of CVA, is on pre-operative treatment with insulin, and does not have a pre-operative creatinine > 2 mg/dL.   The Revised Cardiac Risk Index (RCRI) for this patient indicates 6.6 % risk for rate of cardiac death, nonfatal myocardial infarction, and nonfatal cardiac arrest.     Further comments:  Resume supplements when OK with surgical team.  I would encourage incentive spirometry to assist with minimizing navid-operative pulmonary risk.  DVT prophylaxis and timing of such per the discretion of the surgeon.     Please do not hesitate to contact [facility] during the upcoming hospitalization with any questions or concerns.     Denae Duke, DO  4/2/2019

## 2019-04-17 PROBLEM — R33.9 URINARY RETENTION: Status: ACTIVE | Noted: 2019-04-17

## 2019-04-17 LAB
ANION GAP SERPL CALC-SCNC: 10 MEQ/L (ref 3–15)
ATRIAL RATE: 75
ATRIAL RATE: 79
BACTERIA URNS QL MICRO: NORMAL /HPF
BILIRUB UR QL STRIP.AUTO: NEGATIVE MG/DL
BUN SERPL-MCNC: 19 MG/DL (ref 8–20)
CALCIUM SERPL-MCNC: 8.4 MG/DL (ref 8.9–10.3)
CHLORIDE SERPL-SCNC: 108 MEQ/L (ref 98–109)
CLARITY UR REFRACT.AUTO: CLEAR
CO2 SERPL-SCNC: 20 MEQ/L (ref 22–32)
COLOR UR AUTO: YELLOW
CREAT SERPL-MCNC: 1 MG/DL
ERYTHROCYTE [DISTWIDTH] IN BLOOD BY AUTOMATED COUNT: 13.2 % (ref 11.6–14.4)
GFR SERPL CREATININE-BSD FRML MDRD: >60 ML/MIN/1.73M*2
GLUCOSE BLD-MCNC: 117 MG/DL (ref 70–99)
GLUCOSE BLD-MCNC: 180 MG/DL (ref 70–99)
GLUCOSE BLD-MCNC: 184 MG/DL (ref 70–99)
GLUCOSE BLD-MCNC: 187 MG/DL (ref 70–99)
GLUCOSE SERPL-MCNC: 114 MG/DL (ref 70–99)
GLUCOSE UR STRIP.AUTO-MCNC: >=1000 MG/DL
HCT VFR BLDCO AUTO: 35.9 %
HGB BLD-MCNC: 11.9 G/DL
HGB UR QL STRIP.AUTO: NEGATIVE
HYALINE CASTS #/AREA URNS LPF: NORMAL /LPF
INR PPP: 2.4 INR
KETONES UR STRIP.AUTO-MCNC: ABNORMAL MG/DL
LEUKOCYTE ESTERASE UR QL STRIP.AUTO: NEGATIVE
MAGNESIUM SERPL-MCNC: 2 MG/DL (ref 1.8–2.5)
MCH RBC QN AUTO: 31.6 PG (ref 28–33.2)
MCHC RBC AUTO-ENTMCNC: 33.1 G/DL (ref 32.2–36.5)
MCV RBC AUTO: 95.5 FL (ref 83–98)
NITRITE UR QL STRIP.AUTO: NEGATIVE
PDW BLD AUTO: 10 FL (ref 9.4–12.4)
PH UR STRIP.AUTO: 5.5 [PH]
PHOSPHATE SERPL-MCNC: 4.6 MG/DL (ref 2.4–4.7)
PLATELET # BLD AUTO: 110 K/UL
POCT TEST: ABNORMAL
POTASSIUM SERPL-SCNC: 4.7 MEQ/L (ref 3.6–5.1)
PROT UR QL STRIP.AUTO: NEGATIVE
PROTHROMBIN TIME: 24.6 SEC (ref 12.2–14.5)
QRS DURATION: 110
QRS DURATION: 112
QT INTERVAL: 434
QT INTERVAL: 464
QTC CALCULATION(BAZETT): 481
QTC CALCULATION(BAZETT): 522
R AXIS: 48
R AXIS: 54
RBC # BLD AUTO: 3.76 M/UL (ref 4.5–5.8)
RBC #/AREA URNS HPF: NORMAL /HPF
SODIUM SERPL-SCNC: 138 MEQ/L (ref 136–144)
SP GR UR REFRACT.AUTO: 1.03
SQUAMOUS URNS QL MICRO: NORMAL /HPF
T WAVE AXIS: 106
T WAVE AXIS: 229
UROBILINOGEN UR STRIP-ACNC: 0.2 EU/DL
VENTRICULAR RATE: 74
VENTRICULAR RATE: 76
WBC # BLD AUTO: 5.56 K/UL
WBC #/AREA URNS HPF: NORMAL /HPF

## 2019-04-17 PROCEDURE — 85027 COMPLETE CBC AUTOMATED: CPT | Performed by: NURSE PRACTITIONER

## 2019-04-17 PROCEDURE — 63700000 HC SELF-ADMINISTRABLE DRUG: Performed by: STUDENT IN AN ORGANIZED HEALTH CARE EDUCATION/TRAINING PROGRAM

## 2019-04-17 PROCEDURE — 83735 ASSAY OF MAGNESIUM: CPT | Performed by: PHYSICIAN ASSISTANT

## 2019-04-17 PROCEDURE — 12000000 HC ROOM AND CARE MED/SURG

## 2019-04-17 PROCEDURE — 63700000 HC SELF-ADMINISTRABLE DRUG: Performed by: NURSE PRACTITIONER

## 2019-04-17 PROCEDURE — 81001 URINALYSIS AUTO W/SCOPE: CPT | Performed by: NURSE PRACTITIONER

## 2019-04-17 PROCEDURE — 84100 ASSAY OF PHOSPHORUS: CPT | Performed by: PHYSICIAN ASSISTANT

## 2019-04-17 PROCEDURE — 63700000 HC SELF-ADMINISTRABLE DRUG: Performed by: HOSPITALIST

## 2019-04-17 PROCEDURE — 63700000 HC SELF-ADMINISTRABLE DRUG: Performed by: PHYSICIAN ASSISTANT

## 2019-04-17 PROCEDURE — 63700000 HC SELF-ADMINISTRABLE DRUG

## 2019-04-17 PROCEDURE — 63600000 HC DRUGS/DETAIL CODE: Performed by: NURSE PRACTITIONER

## 2019-04-17 PROCEDURE — 93005 ELECTROCARDIOGRAM TRACING: CPT | Performed by: PHYSICIAN ASSISTANT

## 2019-04-17 PROCEDURE — 97110 THERAPEUTIC EXERCISES: CPT | Mod: GP

## 2019-04-17 PROCEDURE — 36415 COLL VENOUS BLD VENIPUNCTURE: CPT | Performed by: NURSE PRACTITIONER

## 2019-04-17 PROCEDURE — 99232 SBSQ HOSP IP/OBS MODERATE 35: CPT | Performed by: HOSPITALIST

## 2019-04-17 PROCEDURE — 85610 PROTHROMBIN TIME: CPT | Performed by: NURSE PRACTITIONER

## 2019-04-17 PROCEDURE — 80048 BASIC METABOLIC PNL TOTAL CA: CPT | Performed by: NURSE PRACTITIONER

## 2019-04-17 PROCEDURE — 97535 SELF CARE MNGMENT TRAINING: CPT | Mod: GO

## 2019-04-17 PROCEDURE — 97116 GAIT TRAINING THERAPY: CPT | Mod: GP

## 2019-04-17 PROCEDURE — 97530 THERAPEUTIC ACTIVITIES: CPT | Mod: GO

## 2019-04-17 PROCEDURE — 87086 URINE CULTURE/COLONY COUNT: CPT | Performed by: NURSE PRACTITIONER

## 2019-04-17 RX ORDER — OXYCODONE HYDROCHLORIDE 5 MG/1
5 TABLET ORAL
Status: DISCONTINUED | OUTPATIENT
Start: 2019-04-17 | End: 2019-04-19 | Stop reason: HOSPADM

## 2019-04-17 RX ORDER — TAMSULOSIN HYDROCHLORIDE 0.4 MG/1
0.4 CAPSULE ORAL DAILY
Status: DISCONTINUED | OUTPATIENT
Start: 2019-04-17 | End: 2019-04-19 | Stop reason: HOSPADM

## 2019-04-17 RX ADMIN — ACETAMINOPHEN 650 MG: 325 TABLET, FILM COATED ORAL at 21:11

## 2019-04-17 RX ADMIN — TAMSULOSIN HYDROCHLORIDE 0.4 MG: 0.4 CAPSULE ORAL at 08:55

## 2019-04-17 RX ADMIN — Medication 2 G: at 04:50

## 2019-04-17 RX ADMIN — INSULIN ASPART 6 UNITS: 100 INJECTION, SOLUTION INTRAVENOUS; SUBCUTANEOUS at 22:00

## 2019-04-17 RX ADMIN — SENNOSIDES AND DOCUSATE SODIUM 1 TABLET: 8.6; 5 TABLET ORAL at 20:19

## 2019-04-17 RX ADMIN — OXYCODONE HYDROCHLORIDE 10 MG: 5 TABLET ORAL at 20:20

## 2019-04-17 RX ADMIN — LOSARTAN POTASSIUM 100 MG: 100 TABLET, FILM COATED ORAL at 17:40

## 2019-04-17 RX ADMIN — INSULIN DETEMIR 45 UNITS: 100 INJECTION, SOLUTION SUBCUTANEOUS at 21:58

## 2019-04-17 RX ADMIN — ASPIRIN 81 MG: 81 TABLET, COATED ORAL at 08:29

## 2019-04-17 RX ADMIN — INSULIN ASPART 6 UNITS: 100 INJECTION, SOLUTION INTRAVENOUS; SUBCUTANEOUS at 12:22

## 2019-04-17 RX ADMIN — ACETAMINOPHEN 650 MG: 325 TABLET, FILM COATED ORAL at 11:50

## 2019-04-17 RX ADMIN — POTASSIUM CHLORIDE 20 MEQ: 750 TABLET, FILM COATED, EXTENDED RELEASE ORAL at 08:32

## 2019-04-17 RX ADMIN — CARVEDILOL 12.5 MG: 12.5 TABLET, FILM COATED ORAL at 16:13

## 2019-04-17 RX ADMIN — DOFETILIDE 250 MCG: 0.25 CAPSULE ORAL at 08:29

## 2019-04-17 RX ADMIN — INSULIN ASPART 6 UNITS: 100 INJECTION, SOLUTION INTRAVENOUS; SUBCUTANEOUS at 17:40

## 2019-04-17 RX ADMIN — DOFETILIDE 250 MCG: 0.25 CAPSULE ORAL at 20:19

## 2019-04-17 RX ADMIN — OXYCODONE HYDROCHLORIDE 10 MG: 5 TABLET ORAL at 12:21

## 2019-04-17 RX ADMIN — OXYCODONE HYDROCHLORIDE 10 MG: 5 TABLET ORAL at 16:13

## 2019-04-17 RX ADMIN — ACETAMINOPHEN 650 MG: 325 TABLET, FILM COATED ORAL at 04:51

## 2019-04-17 RX ADMIN — OXYCODONE HYDROCHLORIDE 10 MG: 5 TABLET ORAL at 08:24

## 2019-04-17 RX ADMIN — ACETAMINOPHEN 650 MG: 325 TABLET, FILM COATED ORAL at 17:40

## 2019-04-17 RX ADMIN — KETOROLAC TROMETHAMINE 15 MG: 15 INJECTION, SOLUTION INTRAMUSCULAR; INTRAVENOUS at 04:51

## 2019-04-17 RX ADMIN — ROSUVASTATIN CALCIUM 20 MG: 20 TABLET, FILM COATED ORAL at 17:40

## 2019-04-17 RX ADMIN — OXYCODONE HYDROCHLORIDE 5 MG: 5 TABLET ORAL at 17:58

## 2019-04-17 RX ADMIN — CARVEDILOL 12.5 MG: 12.5 TABLET, FILM COATED ORAL at 08:28

## 2019-04-17 RX ADMIN — POLYETHYLENE GLYCOL 3350 17 G: 17 POWDER, FOR SOLUTION ORAL at 08:35

## 2019-04-17 RX ADMIN — SENNOSIDES AND DOCUSATE SODIUM 1 TABLET: 8.6; 5 TABLET ORAL at 08:33

## 2019-04-17 ASSESSMENT — COGNITIVE AND FUNCTIONAL STATUS - GENERAL
DRESSING REGULAR LOWER BODY CLOTHING: 2 - A LOT
EATING MEALS: 4 - NONE
TOILETING: 3 - A LITTLE
WALKING IN HOSPITAL ROOM: 3 - A LITTLE
HELP NEEDED FOR BATHING: 3 - A LITTLE
MOVING TO AND FROM BED TO CHAIR: 3 - A LITTLE
CLIMB 3 TO 5 STEPS WITH RAILING: 3 - A LITTLE
STANDING UP FROM CHAIR USING ARMS: 3 - A LITTLE
HELP NEEDED FOR PERSONAL GROOMING: 4 - NONE
DRESSING REGULAR UPPER BODY CLOTHING: 4 - NONE

## 2019-04-17 NOTE — PROGRESS NOTES
Patient: Roberto Stiles  Location: [facility] 5PAV [id number]  MRN: [id number]  Today's date: 4/17/2019    Pt seated in recliner in ortho gym.     Hospital Course  Sedrick is a 73 y.o. male admitted on 4/16/2019 with Osteoarthritis of left knee, unspecified osteoarthritis type [M17.12]  S/P total knee arthroplasty, left [Z96.652]. Principal problem is S/P total knee arthroplasty, left.    Sedrick has a past medical history of A-fib (CMS/HCC) ([facility]); Arthritis; Bacterial endocarditis; Deep vein thrombosis (CMS/HCC) ([facility]); History of transfusion; Hypertension; Kidney stones; Lipid disorder; Myocardial infarction (CMS/HCC) ([facility]); SCC (squamous cell carcinoma); Sleep apnea; Type 2 diabetes mellitus (CMS/[facility]) ([facility]); and Venous insufficiency.          Therapy Pain/Vitals - 04/17/19 1030        Pain/Comfort/Sleep    Pain Charting Type Pain Assessment    Presence of Pain complains of pain/discomfort    Preferred Pain Scale number (Numeric Rating Pain Scale)    Pain Rating (0-10): Activity 6          Prior Living Environment      Most Recent Value   Lives With  spouse   Living Arrangements  house   Living Environment Comment  multistory home, has first floor set up (basement), enter through the back, 2 EZEKIEL to enter, stall shower, 13 EZEKIEL to second floor   Equipment Currently Used at Home  none          Prior Level of Function      Most Recent Value   Ambulation  independent   Transferring  independent   Toileting  independent   Bathing  independent   Dressing  independent   Eating  independent   Communication  understands/communicates without difficulty   Swallowing  swallows foods/liquids without difficulty   Equipment Currently Used at Home  none   Prior Functional Level Comment  independent prior to surgery                OT Treatment Summary - 04/17/19 1030        Session Details    Document Type daily treatment    Mode of Treatment group therapy;occupational therapy       Time Calculation    Start Time 1030    Stop Time  1106    Time Calculation (min) 36 min       General Information    Pertinent History of Current Functional Problem L TKA    Existing Precautions/Restrictions weight bearing;fall       Weight Bearing Status    Left LE Weight Bearing Status weight bearing as tolerated       Coping Strategies    Trust Relationship/Rapport care explained       Orientation Log    Comment AAOx3       Cognition/Psychosocial    Safety Awareness intact       Attention    Behavioral Observations WFL       Bed Mobility    Bed Mobility supine to sit;sit to supine    [place], Supine to Sit supervision    [place], Sit to Supine supervision    Comment (Bed Mobility) Bed replicated pt's home environment        Sit to Stand Transfer    [place], Sit to Stand Transfer supervision    Verbal Cues hand placement    Assistive Device walker, front-wheeled       Stand to Sit Transfer    [place], Stand to Sit Transfer supervision    Verbal Cues hand placement    Assistive Device walker, front-wheeled       Car Transfer    [place], Car Transfer supervision    Assistive Device walker, front-wheeled    Comment VCs for safety, technique. Min encouragement to perform.       Lower Body Dressing    Comment Educated/demo'd LB dressing AE        Timed Up and Go Test    Trial One: Timed Up and Go Test 45    Results, Timed Up and Go Test (Balance) Pt at increased risk of falls based on score. However pt performed test slowly but safely. OT provided further educated on safety during ADL and functional mobility participation.        AM-PAC (TM) - ADL (Current Function)    Putting on and taking off regular lower body clothing? 2 - A Lot    Bathing? 3 - A Little    Toileting? 3 - A Little    Putting on/taking off regular upper body clothing? 4 - None    How much help for taking care of personal grooming? 4 - None    Eating meals? 4 - None    AM-PAC (TM) ADL Score 20       OT Clinical Impression    Patient's Goals For Discharge take care of myself  at home;return to all previous roles/activities    Functional Limitations in Following Categories self-care;home management;community/leisure    Anticipated Equipment Needs at Discharge bathing equipment;dressing equipment;toileting equipment    Daily Outcome Statement [facility] ADL score= 20. Pt supervision for bed mobility and functional transfers. OT recommended AE for LB dressing.           Pain Assessment/Intervention  Pain Charting Type: Pain Assessment        Education provided this session. See the Patient Education summary report for full details.         OT Care Plan Goals      Most Recent Value   Bed Mobility Goal   Date Goal Established: Bed Mobility  04/16/19   Time to Achieve Goal: Bed Mobility  5 - 7 days   Goal Activity: Bed Mobility  all bed mobility activities   Level of [place] Goal: Bed Mobility  modified independence   Goal Outcome: Bed Mobility  goal ongoing   Lower Body Dressing Goal   Time to Achieve Goal: Lower Body Dressing  5 - 7 days   Level of [place]  modified independence   Goal Outcome: Lower Body Dressing  goal ongoing   Toilet Transfer Goal   Time to Achieve Goal: Toilet Transfer Training  5 - 7 days   Level of [place] Goal: Toilet Transfer Training  modified independence   Assistive Device: Toilet Transfer Training  raised toilet seat, grab bars/safety frame, walker, front-wheeled   Goal Outcome: Toilet Transfer Training  goal ongoing   Transfer Goal   Date Goal Established: Transfer Training  04/16/19   Time to Achieve Goal: Transfer Training  5 - 7 days   Goal Activity: Transfer Training  sit to stand/stand to sit   Level of [place] Goal: Transfer Training  modified independence   Assistive Device: Transfer Training  walker, front-wheeled   Goal Outcome: Transfer Training  goal ongoing   Tub-Shower Transfer Goal   Time to Achieve Goal: Tub-Shower Transfer Training  5 - 7 days   Level of [place] Goal: Tub-Shower Transfer Training  modified independence    Assistive Device: Tub-Shower Transfer Training  grab bars/tub rail, walker, front-wheeled, shower chair   Goal Outcome: Tub-Shower Transfer Training  goal ongoing

## 2019-04-17 NOTE — PLAN OF CARE
Problem: Knee Arthroplasty (Total, Partial) (Adult)  Goal: Signs and Symptoms of Listed Potential Problems Will be Absent, Minimized or Managed (Knee Arthroplasty)  Outcome: Ongoing (interventions implemented as appropriate)   04/17/19 1349   Knee Arthroplasty (Total, Partial)   Problems Assessed (Knee Arthroplasty) decreased range of motion;functional deficit;pain   Problems Present (Knee Arthroplasty) decreased range of motion;functional deficit;pain

## 2019-04-17 NOTE — PLAN OF CARE
Problem: Patient Care Overview  Goal: Plan of Care Review  Outcome: Ongoing (interventions implemented as appropriate)   04/17/19 9510   Coping/Psychosocial   Plan Of Care Reviewed With patient;spouse   Plan of Care Review   Progress progress toward functional goals as expected   Outcome Summary Pt performs therex, transfers, stair and curb management, and ambulation at -S level for safety due to decreased stability and heightened pain levels. Pt did not appear fatigued, but did require a break inbetween OT and PT sessions to allow pain levels to decrease. Pt ok to return home with assistance and will seek outpatient phyiscal therapy to address residual ROM and strength deficits for safe progression to PLOF. Pt staying the night in hospital due to medical needs, and will benefit from inpatient acute therapy visit to reassess functional mobility with decreased pain levels.

## 2019-04-17 NOTE — NURSING NOTE
Pt voided 125 cc.  Bladder scanned 597 cc.  Per SHAUNA Bryant, have pt take a walk and continue to monitor post void residuals.

## 2019-04-17 NOTE — NURSING NOTE
Pt voided 325 cc overnight. Bladder scanned this am for 857 cc. Straight cath'd without difficulty gfor 950 cc of clear yellow urine. Pt DTV at 1300. Pt states that he does have a history of fluid retention.

## 2019-04-17 NOTE — PROGRESS NOTES
Patient: Roberto Stiles  Location: [facility] 5PAV 5450  MRN: [id number]  Today's date: 4/17/2019    Pt left in recliner chair with call bell and phone within reach. Pt in NAD. Wife in room. Nursing notified that pt was cleared by PT to return home when medically stable and that we would follow up with the pt in the am if time permits.    Hospital Course  Sedrick is a 73 y.o. male admitted on 4/16/2019 with Osteoarthritis of left knee, unspecified osteoarthritis type [M17.12]  S/P total knee arthroplasty, left [Z96.652]. Principal problem is S/P total knee arthroplasty, left.    Sedrick has a past medical history of A-fib (CMS/HCC) (Formerly [facility]); Arthritis; Bacterial endocarditis; Deep vein thrombosis (CMS/HCC) (Formerly [facility]); History of transfusion; Hypertension; Kidney stones; Lipid disorder; Myocardial infarction (CMS/HCC) (Formerly [facility]); SCC (squamous cell carcinoma); Sleep apnea; Type 2 diabetes mellitus (CMS/HCC) (Formerly [facility]); and Venous insufficiency.       04/17/19 1004   Pain/Comfort/Sleep   Pain Charting Type Pain Assessment   Presence of Pain complains of pain/discomfort   Preferred Pain Scale number (Numeric Rating Pain Scale)   Pain Body Location - Side Left   Pain Body Location knee   Pain Rating (0-10): Rest 8   Pain Rating (0-10): Activity 9   Pain Management Interventions cold applied;position adjusted          Prior Living Environment      Most Recent Value   Lives With  spouse   Living Arrangements  house   Living Environment Comment  multistory home, has first floor set up (basement), enter through the back, 2 EZEKIEL to enter, stall shower, 13 EZEKIEL to second floor   Equipment Currently Used at Home  none          Prior Level of Function      Most Recent Value   Ambulation  independent   Transferring  independent   Toileting  independent   Bathing  independent   Dressing  independent   Eating  independent   Communication  understands/communicates without difficulty   Swallowing  swallows foods/liquids without difficulty    Equipment Currently Used at Home  none   Prior Functional Level Comment  independent prior to surgery                PT Treatment Summary - 04/17/19 1004        Session Details    Document Type daily treatment    Mode of Treatment group therapy;physical therapy    Patient/Family Observations Brought to therapy gym in recliner chair. Wife accompanying.       Time Calculation    Start Time 1004    Stop Time 1126    Time Calculation (min) 82 min       General Information    Patient Profile Reviewed? yes    Onset of Illness/Injury or Date of Surgery 04/16/19    Referring Physician Joshua Shafer    Pertinent History of Current Functional Problem L TKA    Existing Precautions/Restrictions fall;weight bearing       Weight Bearing Status    Left LE Weight Bearing Status weight bearing as tolerated       Orientation Log    Comment A&Ox3       Cognition/Psychosocial    Safety Awareness intact       Attention    Behavioral Observations WFL       Safety Awareness/Health Promotion    Fall Prevention demonstrates safety awareness related to fall risk       Range of Motion (ROM)    General Range of Motion lower extremity range of motion deficits identified       General LE Assessment    Lower Extremity: Range of Motion knee, left: LE ROM deficit       Manual Muscle Testing (MMT)    General MMT Assessment lower extremity strength deficits identified       Lower Extremity    Lower Extremity: Manual Muscle Testing left knee strength deficit       Transfers    Maintains Weight Bearing Status (Transfers) able to maintain weight bearing status    Comment Pt STS recliner chair<>RW and vice versa with CS. Pt demosntrates appropriate technique, but decreased R LE strength to slowly lower himself into the chair.       Sit to Stand Transfer    [place], Sit to Stand Transfer close supervision    Verbal Cues safety    Assistive Device walker, front-wheeled    Comment See above       Stand to Sit Transfer    [place], Stand to Sit  Transfer close supervision    Verbal Cues safety;technique    Assistive Device walker, front-wheeled    Comment See above       Gait Analysis/Training    Gait/Stairs Locomotion Gait Training (Group);Stairs Training (Group);Curb Management (Group)       Gait Training    [place], Gait supervision    Assistive Device walker, front-wheeled    Distance in Feet 120 feet    Gait Pattern Utilized 2-point    Gait Deviations Identified left;antalgic;decreased gait speed;decreased brenden;decreased heel strike;decreased weight shifting    Maintains Weight Bearing Status able to maintain weight bearing status    Comment Pt ambulated 120 ft in RW using a reciprocal gait pattern, but pt reported high levels of pain and exhibited decreased weight shifting onto his L LE.        Stairs Training    [place], Stairs close supervision    Assistive Device cane, straight;railing    Handrail Location right side (ascending);left side (descending)    Number of Stairs 4    Ascending Stairs Technique step-to-step    Descending Stairs Technique step-to-step    Comment Pt performed under CS heavily relying on the HR and SPC for stability, but no LOB occurred. Pt required minimal verbal cueing for recall of appropriate sequence.       Curb Management    [place] close supervision    Assistive Device walker, front-wheeled    Specific Task Elements Addressed [place] and community functional mobility    Impairments Contributing to Functional Deficit balance;flexibility;functional activity tolerance;pain;range of motion;strength;weight shifting ability    Comment Pt performed with adequate stability in RW, but CS employed for safety due to high pain levels and decreased weight shifting onto the pt's R LE to maintain WBAT precautions.       LE Seated Therapeutic Exercise    Exercise(s) Performed hip abduction;knee flexion;LAQ (long arc quad), knee extension;ankle plantarflexion;ankle dorsiflexion;other (see comments)   ankle circles,  glute and quad sets    Exercise Type AROM (active range of motion);isometric contraction, static    Expected Outcomes improve functional tolerance, community activity;improve functional tolerance, household activity;improve functional tolerance, self-care activity;improve neuromuscular control;improve motor control;improve functional stability;improve performance, gait skills;improve performance, transfer skills;strengthen, facilitate independent active range of motion    Restrictions pain and swelling    Sets/Reps Detail 10 reps per ex.    Ability to Transfer Skills able to transfer skills from training to functional activity    Comment Pt exhibited appropriate technique with therex.       AM-PAC (TM) - Mobility (Current Function)    Turning from your back to your side while in a flat bed without using bedrails? 3 - A Little    Moving from lying on your back to sitting on the side of a flat bed without using bedrails? 3 - A Little    Moving to and from a bed to a chair? 3 - A Little    Standing up from a chair using your arms? 3 - A Little    To walk in a hospital room? 3 - A Little    Climbing 3-5 steps with a railing? 3 - A Little    AM-PAC (TM) Mobility Score 18       PT Clinical Impression    Plan For Care Reviewed: Physical Therapy PT plan for care discussed with patient;patient feedback incorporated in PT plan for care;patient voices agreement with PT plan for care;family voices agreement with PT plan for care;PT plan for care discussed with family    System Pathology/Pathophysiology Noted musculoskeletal    Rehab Potential/Prognosis good, to achieve stated therapy goals    PT Frequency of Treatment 5-7 times per week    Problem List abnormal muscle tone;decreased flexibility;decreased ROM;decreased strength;impaired balance;pain    Activity Limitations Related to Problem List functional mobility not performed adequately or safely for household activity;functional mobility not performed adequately or safely  for community activity    PT Recommended Discharge Disposition home with assist    Daily Outcome Statement Pt performs therex, transfers, stair and curb management, and ambulation at -S level for safety due to decreased stability and heightened pain levels. Pt did not appear fatigued, but did require a break inbetween OT and PT sessions to allow pain levels to decrease. Pt ok to return home with assistance and will seek outpatient phyiscal therapy to address residual ROM and strength deficits for safe progression to PLOF. Pt staying the night in hospital due to medical needs, and will benefit from inpatient acute therapy visit to reassess functional mobility with decreased pain levels.          Pain Assessment/Intervention  Pain Charting Type: Pain Assessment             Education provided this session. See the Patient Education summary report for full details.    PT Care Plan Goals      Most Recent Value   Stair Goal, PT   PT STG: Stairs  modified independence   PT STG: Number of Stairs  4   PT STG Assistive Device: Stairs  cane, straight   PT STG Date Established: Stairs  04/16/19   PT STG Duration: Stairs  7 days or less   PT STG Outcome: Stairs  goal ongoing      PT Care Plan Goals      Most Recent Value   Bed Mobility Goal   Date Goal Established: Bed Mobility  04/16/19   Time to Achieve Goal: Bed Mobility  5 - 7 days   Goal Activity: Bed Mobility  all bed mobility activities   Level of [place] Goal: Bed Mobility  modified independence   Goal Outcome: Bed Mobility  goal ongoing   Gait Goal   Date Goal Established: Gait Training  04/16/19   Time to Achieve Goal: Gait Training  5 - 7 days   Level of [place]  modified independence   Assistive Device: Gait Training  walker, front-wheeled   Distance Goal: Gait Training (feet)  150 feet   Goal Outcome: Gait Training  goal ongoing   Transfer Goal   Date Goal Established: Transfer Training  04/16/19   Time to Achieve Goal: Transfer Training  5 - 7 days    Goal Activity: Transfer Training  sit to stand/stand to sit   Level of [place] Goal: Transfer Training  modified independence   Assistive Device: Transfer Training  walker, front-wheeled   Goal Outcome: Transfer Training  goal ongoing   Physical Therapy Goal   Date Goal Established: Physical Therapy  04/16/19   Time to Achieve Goal: Physical Therapy  5 - 7 days   Goal Activity: Physical Therapy  Pt will perform a car transfer   Level of [place] Goal: Physical Therapy  modified independence   Goal Outcome: Physical Therapy  goal ongoing        Corinne Palombo SPTA

## 2019-04-17 NOTE — PATIENT CARE CONFERENCE
Care Progression Rounds Note  Date: 4/17/2019  Time: 10:30 AM     Patient Name: Roberto Stiles     Medical Record Number: [id number]   YOB: 1945  Sex: Male      Room/Bed: 5450    Admitting Diagnosis: Osteoarthritis of left knee, unspecified osteoarthritis type [M17.12]  S/P total knee arthroplasty, left [Z96.652]   Admit Date/Time: 4/16/2019  9:43 AM    Primary Diagnosis: S/P total knee arthroplasty, left  Principal Problem: S/P total knee arthroplasty, left    GMLOS: pending  Anticipated Discharge Date: 4/18/2019    AM-PAC  Mobility Score: 18    Discharge Planning:  Living Arrangements: house  Concerns To Be Addressed: no discharge needs identified    Barriers to Discharge:  Barriers to Discharge: Therapy update pending    Participants:  advanced practice provider, , nursing, physical therapy, social work/services

## 2019-04-17 NOTE — PROGRESS NOTES
Hospital Medicine Service -  Daily Progress Note       SUBJECTIVE   Interval History: Left knee pain improving  Denies any chest pain or shortness of breath  Had issues with urinary retention last night requiring straight cath     OBJECTIVE      Vital signs in last 24 hours:  Temp:  [35.8 °C (96.5 °F)-37.1 °C (98.7 °F)] 36.5 °C (97.7 °F)  Heart Rate:  [65-78] 75  Resp:  [11-68] 14  BP: (111-169)/(55-82) 131/75    Intake/Output Summary (Last 24 hours) at 04/17/19 0842  Last data filed at 04/17/19 0529   Gross per 24 hour   Intake             1960 ml   Output             1275 ml   Net              685 ml       PHYSICAL EXAMINATION      Physical Exam   General appearance: alert, appears stated age, cooperative, non-toxic  Head: normocephalic, without obvious abnormality, atraumatic  Eyes: conjunctivae clear. PERRL, EOMI's intact.  Lungs: clear to auscultation bilaterally   Heart: regular rate and rhythm, S1, S2 normal,   Abdomen: Nondistended, +BS, soft, non-tender, no masses palpable   Extremities: Left knee is dressed  Pulses: 2+ and symmetric B/L DP  Neurologic: Alert and oriented X 3, no focal deficits  Skin: intact, no rashes or lesions       LINES, CATHETERS, DRAINS, AIRWAYS, AND WOUNDS   Lines, Drains, Airways, Wounds:  Peripheral IV 04/16/19 Left Hand (Active)   Number of days: 1       Surgical Incision Knee Left (Active)   Number of days: 1       Comments:      LABS / IMAGING / TELE      Labs  Lab Results   Component Value Date    WBC 5.56 04/17/2019    HGB 11.9 (L) 04/17/2019    HCT 35.9 (L) 04/17/2019     (L) 04/17/2019    ALT 30 07/06/2015    AST 24 07/06/2015     04/17/2019    K 4.7 04/17/2019     04/17/2019    CREATININE 1.0 04/17/2019    BUN 19 04/17/2019    CO2 20 (L) 04/17/2019    TSH 1.78 07/06/2015    INR 2.4 04/17/2019    HGBA1C 7.2 (H) 04/02/2019       Imaging  I have independently reviewed the pertinent imaging from the last 24 hrs.    ECG/Telemetry  I have independently  reviewed the telemetry. No events for the last 24 hours.    ASSESSMENT AND PLAN      * S/P total knee arthroplasty, left   Assessment & Plan    S/P L total knee, POD# 1  Pain control per orthopedics  DVT ppx per orthopedics  PT/OT  Encourage IS  Bowel regimen  Follow post op labs     History of heart valve replacement with bioprosthetic valve   Assessment & Plan    HO Raine Shiley mechanical MV  Records show stable on recent JOSELIN w/ nml valve function. Record indicated EF 50%.       HLD (hyperlipidemia)   Assessment & Plan    HOLD Fish oil for now  Continue statin.     Atrial fibrillation (CMS/HCC)   Assessment & Plan    S/P Ablation for AF/VT. Follows with Dr. Teran, [facility]  S/P VT ablation Jan 2019.  On Tikosyn, Coumadin and Coreg  INR 1.9 on 4/16 and went upto 2.4 today- ortho is concerned about the sudden jump and wants to hold today to avoid bleeding  Will need remote tele monitoring  Daily INR  Lytes wnl  Check ekg today while on tikosyn  Continue Coreg, Coumadin, and Tikosyn     Essential hypertension   Assessment & Plan    On Coreg, Lasix and Cozaar  HOLD Lasix  Continue Coreg and Cozaar.  Will monitor BP closely     Type 2 diabetes mellitus, with long-term current use of insulin (CMS/HCC)   Assessment & Plan    Type 2 DM, on Synjardy, Ozempic, and Levemir  HgbA1C in pre op labs noted to be 7.2  Continue oral agents and Levemir  SSI while in hospital  Monitor accuchecks  NCS diet.     SANA on CPAP   Assessment & Plan    Reportedly on CPAP at home at night  Continue CPAP qhs per home settings  Monitor on SANA protocol.          VTE Assessment: [place]    VTE Prophylaxis Plan: coumadin  Code Status: Full Code  Estimated Discharge Date: 4/18/2019  Disposition Planning: home when ok with ortho     Osmani Nicholas MD  4/17/2019

## 2019-04-17 NOTE — PLAN OF CARE
Problem: Patient Care Overview  Goal: Plan of Care Review  Outcome: Ongoing (interventions implemented as appropriate)   04/16/19 2004   Coping/Psychosocial   Plan Of Care Reviewed With patient   Plan of Care Review   Progress improving   Outcome Summary Pt OOB to chair with assist x1.        Problem: Knee Arthroplasty (Total, Partial) (Adult)  Goal: Signs and Symptoms of Listed Potential Problems Will be Absent, Minimized or Managed (Knee Arthroplasty)  Outcome: Ongoing (interventions implemented as appropriate)    Goal: Anesthesia/Sedation Recovery  Outcome: Outcome(s) Achieved Date Met: 04/16/19

## 2019-04-17 NOTE — PLAN OF CARE
Problem: Patient Care Overview  Goal: Plan of Care Review  Outcome: Ongoing (interventions implemented as appropriate)   04/17/19 1324   Coping/Psychosocial   Plan Of Care Reviewed With patient   Plan of Care Review   Progress progress towards functional goals is fair   Outcome Summary Pt completed OT portion of ortho group.        Problem: Knee Arthroplasty (Total, Partial) (Adult)  Goal: Signs and Symptoms of Listed Potential Problems Will be Absent, Minimized or Managed (Knee Arthroplasty)  Outcome: Ongoing (interventions implemented as appropriate)

## 2019-04-17 NOTE — PLAN OF CARE
Problem: Patient Care Overview  Goal: Plan of Care Review  Outcome: Ongoing (interventions implemented as appropriate)   04/17/19 7607   Coping/Psychosocial   Plan Of Care Reviewed With patient   Plan of Care Review   Progress improving   Outcome Summary pt seen by PT/OT today. coumadin to be held tonight per ortho. straight cathed X 2. DTV 0030. OOB X 1 assist. oxycodone for pain.      Goal: Individualization & Mutuality  Outcome: Ongoing (interventions implemented as appropriate)      Problem: Knee Arthroplasty (Total, Partial) (Adult)  Goal: Signs and Symptoms of Listed Potential Problems Will be Absent, Minimized or Managed (Knee Arthroplasty)  Outcome: Ongoing (interventions implemented as appropriate)

## 2019-04-17 NOTE — PROGRESS NOTES
Orthopaedic Surgery Progress Note:     NAVYA   Pain controlled    Vitals:    04/17/19 0534   BP:    Pulse: 71   Resp:    Temp:    SpO2: 98%       Dressing clean/dry/intact  Motor intact to EHL/PF/DF  SILT all distributions  Palpable DP    A/P s/p L TKA  -PT/OT  -Pain control  -DVT PPX

## 2019-04-17 NOTE — ASSESSMENT & PLAN NOTE
Required straight cath few times and now bacon placed-voiding trial today  Continue  Laxatives  Out of bed and ambulate  Continue flomax  Continue bowel regimen  Urology consult noted

## 2019-04-17 NOTE — PLAN OF CARE
Problem: Patient Care Overview  Goal: Discharge Needs Assessment  Outcome: Ongoing (interventions implemented as appropriate)   04/17/19 1524   DC Needs Assessment   Concerns To Be Addressed no discharge needs identified;denies needs/concerns at this time   Readmission Within The Last 30 Days no previous admission in last 30 days   Anticipated Discharge Disposition home with outpatient services   Type of Outpatient Services physical therapy   Equipment Needed After Discharge walker, rolling   Current Health   Anticipated Changes Related to Illness none   Activity/Self Care ROS   Equipment Currently Used at Home none   Met with patient. Independent with adls prior to admission. Lives with wife and plans to return home at discharge. Will attend out patient physical therapy at [facility]. Has appointment on Monday. Family will transport home when discharged.

## 2019-04-18 LAB
ANION GAP SERPL CALC-SCNC: 10 MEQ/L (ref 3–15)
ATRIAL RATE: 84
BACTERIA UR CULT: NORMAL
BUN SERPL-MCNC: 18 MG/DL (ref 8–20)
CALCIUM SERPL-MCNC: 9 MG/DL (ref 8.9–10.3)
CHLORIDE SERPL-SCNC: 109 MEQ/L (ref 98–109)
CO2 SERPL-SCNC: 24 MEQ/L (ref 22–32)
CREAT SERPL-MCNC: 1 MG/DL
GFR SERPL CREATININE-BSD FRML MDRD: >60 ML/MIN/1.73M*2
GLUCOSE BLD-MCNC: 133 MG/DL (ref 70–99)
GLUCOSE BLD-MCNC: 158 MG/DL (ref 70–99)
GLUCOSE BLD-MCNC: 159 MG/DL (ref 70–99)
GLUCOSE BLD-MCNC: 172 MG/DL (ref 70–99)
GLUCOSE SERPL-MCNC: 137 MG/DL (ref 70–99)
INR PPP: 2.2 INR
MAGNESIUM SERPL-MCNC: 2 MG/DL (ref 1.8–2.5)
POCT TEST: ABNORMAL
POTASSIUM SERPL-SCNC: 5 MEQ/L (ref 3.6–5.1)
PROTHROMBIN TIME: 23.6 SEC (ref 12.2–14.5)
QRS DURATION: 112
QT INTERVAL: 360
QTC CALCULATION(BAZETT): 425
R AXIS: 53
SODIUM SERPL-SCNC: 143 MEQ/L (ref 136–144)
T WAVE AXIS: 261
VENTRICULAR RATE: 84

## 2019-04-18 PROCEDURE — 36415 COLL VENOUS BLD VENIPUNCTURE: CPT | Performed by: NURSE PRACTITIONER

## 2019-04-18 PROCEDURE — 85610 PROTHROMBIN TIME: CPT | Performed by: NURSE PRACTITIONER

## 2019-04-18 PROCEDURE — 63700000 HC SELF-ADMINISTRABLE DRUG: Performed by: PHYSICIAN ASSISTANT

## 2019-04-18 PROCEDURE — 93005 ELECTROCARDIOGRAM TRACING: CPT | Performed by: HOSPITALIST

## 2019-04-18 PROCEDURE — 63700000 HC SELF-ADMINISTRABLE DRUG: Performed by: STUDENT IN AN ORGANIZED HEALTH CARE EDUCATION/TRAINING PROGRAM

## 2019-04-18 PROCEDURE — 63700000 HC SELF-ADMINISTRABLE DRUG: Performed by: NURSE PRACTITIONER

## 2019-04-18 PROCEDURE — 99232 SBSQ HOSP IP/OBS MODERATE 35: CPT | Performed by: HOSPITALIST

## 2019-04-18 PROCEDURE — 63700000 HC SELF-ADMINISTRABLE DRUG: Performed by: HOSPITALIST

## 2019-04-18 PROCEDURE — 80048 BASIC METABOLIC PNL TOTAL CA: CPT | Performed by: HOSPITALIST

## 2019-04-18 PROCEDURE — 97530 THERAPEUTIC ACTIVITIES: CPT | Mod: GP

## 2019-04-18 PROCEDURE — 83735 ASSAY OF MAGNESIUM: CPT | Performed by: HOSPITALIST

## 2019-04-18 PROCEDURE — 12000000 HC ROOM AND CARE MED/SURG

## 2019-04-18 PROCEDURE — 97116 GAIT TRAINING THERAPY: CPT | Mod: GP

## 2019-04-18 RX ORDER — WARFARIN 2.5 MG/1
2.5 TABLET ORAL ONCE
Status: DISCONTINUED | OUTPATIENT
Start: 2019-04-18 | End: 2019-04-18

## 2019-04-18 RX ORDER — WARFARIN SODIUM 5 MG/1
5 TABLET ORAL ONCE
Status: COMPLETED | OUTPATIENT
Start: 2019-04-18 | End: 2019-04-18

## 2019-04-18 RX ORDER — BISACODYL 10 MG/1
10 SUPPOSITORY RECTAL DAILY
Status: DISCONTINUED | OUTPATIENT
Start: 2019-04-18 | End: 2019-04-19 | Stop reason: HOSPADM

## 2019-04-18 RX ADMIN — ACETAMINOPHEN 650 MG: 325 TABLET, FILM COATED ORAL at 09:50

## 2019-04-18 RX ADMIN — POTASSIUM CHLORIDE 20 MEQ: 750 TABLET, FILM COATED, EXTENDED RELEASE ORAL at 08:19

## 2019-04-18 RX ADMIN — OXYCODONE HYDROCHLORIDE 10 MG: 5 TABLET ORAL at 12:30

## 2019-04-18 RX ADMIN — WARFARIN SODIUM 5 MG: 5 TABLET ORAL at 17:31

## 2019-04-18 RX ADMIN — ASPIRIN 81 MG: 81 TABLET, COATED ORAL at 08:20

## 2019-04-18 RX ADMIN — ACETAMINOPHEN 650 MG: 325 TABLET, FILM COATED ORAL at 17:31

## 2019-04-18 RX ADMIN — OXYCODONE HYDROCHLORIDE 10 MG: 5 TABLET ORAL at 21:13

## 2019-04-18 RX ADMIN — INSULIN DETEMIR 45 UNITS: 100 INJECTION, SOLUTION SUBCUTANEOUS at 22:21

## 2019-04-18 RX ADMIN — CARVEDILOL 12.5 MG: 12.5 TABLET, FILM COATED ORAL at 08:20

## 2019-04-18 RX ADMIN — SENNOSIDES AND DOCUSATE SODIUM 1 TABLET: 8.6; 5 TABLET ORAL at 08:20

## 2019-04-18 RX ADMIN — SENNOSIDES AND DOCUSATE SODIUM 1 TABLET: 8.6; 5 TABLET ORAL at 21:11

## 2019-04-18 RX ADMIN — OXYCODONE HYDROCHLORIDE 10 MG: 5 TABLET ORAL at 01:09

## 2019-04-18 RX ADMIN — OXYCODONE HYDROCHLORIDE 10 MG: 5 TABLET ORAL at 08:20

## 2019-04-18 RX ADMIN — ROSUVASTATIN CALCIUM 20 MG: 20 TABLET, FILM COATED ORAL at 17:31

## 2019-04-18 RX ADMIN — CARVEDILOL 12.5 MG: 12.5 TABLET, FILM COATED ORAL at 17:32

## 2019-04-18 RX ADMIN — TAMSULOSIN HYDROCHLORIDE 0.4 MG: 0.4 CAPSULE ORAL at 08:19

## 2019-04-18 RX ADMIN — DOFETILIDE 250 MCG: 0.25 CAPSULE ORAL at 21:12

## 2019-04-18 RX ADMIN — POLYETHYLENE GLYCOL 3350 17 G: 17 POWDER, FOR SOLUTION ORAL at 08:19

## 2019-04-18 RX ADMIN — LOSARTAN POTASSIUM 100 MG: 100 TABLET, FILM COATED ORAL at 17:31

## 2019-04-18 RX ADMIN — BISACODYL 10 MG: 10 SUPPOSITORY RECTAL at 15:55

## 2019-04-18 RX ADMIN — DOFETILIDE 250 MCG: 0.25 CAPSULE ORAL at 08:20

## 2019-04-18 RX ADMIN — OXYCODONE HYDROCHLORIDE 10 MG: 5 TABLET ORAL at 04:57

## 2019-04-18 RX ADMIN — ACETAMINOPHEN 650 MG: 325 TABLET, FILM COATED ORAL at 04:57

## 2019-04-18 RX ADMIN — OXYCODONE HYDROCHLORIDE 10 MG: 5 TABLET ORAL at 17:32

## 2019-04-18 ASSESSMENT — COGNITIVE AND FUNCTIONAL STATUS - GENERAL
STANDING UP FROM CHAIR USING ARMS: 3 - A LITTLE
WALKING IN HOSPITAL ROOM: 3 - A LITTLE
CLIMB 3 TO 5 STEPS WITH RAILING: 3 - A LITTLE
MOVING TO AND FROM BED TO CHAIR: 3 - A LITTLE

## 2019-04-18 NOTE — PLAN OF CARE
Problem: Patient Care Overview  Goal: Plan of Care Review  Outcome: Ongoing (interventions implemented as appropriate)   04/18/19 0814   Coping/Psychosocial   Plan Of Care Reviewed With patient   Plan of Care Review   Progress improving   Outcome Summary pt seen by PT/OT today. bacon catheter in place. oxycodone for pain. OOB X 1 assist. pt seen by urology today.        Problem: Knee Arthroplasty (Total, Partial) (Adult)  Goal: Signs and Symptoms of Listed Potential Problems Will be Absent, Minimized or Managed (Knee Arthroplasty)  Outcome: Ongoing (interventions implemented as appropriate)

## 2019-04-18 NOTE — NURSING NOTE
Patient ordered his home dose of Tikosyn.  Parameters in MAR to monitor QTC and inc/hold dose based on QTC.  Measured patient's QTC to be 0.59.  Notified Dr. Pascal of orders in computer and patient's QTC.  Per Dr. Pascal, give patient's Tikosyn.  Tikosyn administered.  Will continue to monitor patient.

## 2019-04-18 NOTE — PROGRESS NOTES
Patient: Roberto Stiles  Location: [facility] 5PAV [id number]  MRN: [id number]  Today's date: 4/18/2019    Pt left in recliner chair with LE's elevated in NAD. Seat alarm on, and call bell and phone within reach. Wife visiting.     Hospital Course  Sedrick is a 73 y.o. male admitted on 4/16/2019 with Osteoarthritis of left knee, unspecified osteoarthritis type [M17.12]  S/P total knee arthroplasty, left [Z96.652]. Principal problem is S/P total knee arthroplasty, left.    Sedrick has a past medical history of A-fib (CMS/[facility]) ([facility]); Arthritis; Bacterial endocarditis; Deep vein thrombosis (CMS/HCC) ([facility]); History of transfusion; Hypertension; Kidney stones; Lipid disorder; Myocardial infarction (CMS/[facility]) ([facility]); SCC (squamous cell carcinoma); Sleep apnea; Type 2 diabetes mellitus (CMS/[facility]) ([facility]); and Venous insufficiency.       04/18/19 0854   Pain/Comfort/Sleep   Pain Charting Type Pain Assessment   Presence of Pain complains of pain/discomfort   Preferred Pain Scale number (Numeric Rating Pain Scale)   Pain Body Location - Side Left   Pain Body Location knee   Pain Rating (0-10): Rest 3   Pain Rating (0-10): Activity 4      Pain management:                             Position adjusted        Prior Living Environment      Most Recent Value   Lives With  spouse   Living Arrangements  house   Living Environment Comment  multistory home, has first floor set up (basement), enter through the back, 2 EZEKIEL to enter, stall shower, 13 EZEKIEL to second floor   Equipment Currently Used at Home  none          Prior Level of Function      Most Recent Value   Ambulation  independent   Transferring  independent   Toileting  independent   Bathing  independent   Dressing  independent   Eating  independent   Communication  understands/communicates without difficulty   Swallowing  swallows foods/liquids without difficulty   Equipment Currently Used at Home  none   Prior Functional Level Comment  independent prior to surgery                 PT Treatment Summary - 04/18/19 0854        Session Details    Document Type daily treatment    Mode of Treatment individual therapy;physical therapy    Patient/Family Observations Sitting up in bed. Puckett catheter noted.       Time Calculation    Start Time 0854    Stop Time 0934    Time Calculation (min) 40 min       General Information    Patient Profile Reviewed? yes    Onset of Illness/Injury or Date of Surgery 04/16/19    Referring Physician Joshua Shafer    Pertinent History of Current Functional Problem L TKA    Existing Precautions/Restrictions fall;weight bearing       Weight Bearing Status    Left LE Weight Bearing Status weight bearing as tolerated       Orientation Log    Comment A&Ox3       Cognition/Psychosocial    Safety Awareness intact       Attention    Behavioral Observations WFL       Safety Awareness/Health Promotion    Fall Prevention demonstrates safety awareness related to fall risk       Range of Motion (ROM)    General Range of Motion lower extremity range of motion deficits identified       General LE Assessment    Lower Extremity: Range of Motion knee, left: LE ROM deficit       Manual Muscle Testing (MMT)    General MMT Assessment lower extremity strength deficits identified       Lower Extremity    Lower Extremity: Manual Muscle Testing left knee strength deficit       Transfers    Maintains Weight Bearing Status (Transfers) able to maintain weight bearing status    Comment Pt performed STS recliner chair<>RW and vice versa safely and effectively.       Sit to Stand Transfer    [place], Sit to Stand Transfer supervision    Assistive Device walker, front-wheeled    Comment S for safety       Stand to Sit Transfer    [place], Stand to Sit Transfer supervision    Assistive Device walker, front-wheeled    Comment S for safety       Gait Training    [place], Gait supervision    Assistive Device walker, front-wheeled    Distance in Feet 180 feet    Gait Pattern Utilized  2-point    Gait Deviations Identified decreased brenden;decreased gait speed;left;antalgic;decreased step length;decreased stride length    Maintains Weight Bearing Status able to maintain weight bearing status    Comment Pt ambulated 180 ft in RW under S without reports or physical signs of distress and improved fluidity of gait. Pt did exhibit slight muscular fatigue towards end of walk, however, requiring verbal cues to avoid dragging his feet. Step and stride length still restricted by L knee pain.       Stairs Training    [place], Stairs supervision    Assistive Device cane, straight    Handrail Location right side (descending);left side (descending)    Number of Stairs 4    Ascending Stairs Technique step-to-step    Descending Stairs Technique step-to-step    Comment Stair climbing under S and handling of Puckett catheter. Pt utilized R HR and L SPC for task. Pt able to recall appropriate sequence and stabilize and continue task post slight perturbation to R LE well.       Curb Management    [place] supervision    Assistive Device walker, front-wheeled    Specific Task Elements Addressed Household and community functional mobility    Impairments Contributing to Functional Deficit pain;flexibility;balance;functional activity tolerance;range of motion;strength    Comment S level for safety.       LE Seated Therapeutic Exercise    Exercise(s) Performed knee flexion;ankle dorsiflexion;ankle plantarflexion    Exercise Type AROM (active range of motion)    Expected Outcomes improve performance, gait skills;improve performance, transfer skills;improve functional tolerance, community activity;improve functional tolerance, household activity;improve functional tolerance, self-care activity;strengthen, facilitate independent active range of motion    Restrictions pain and swelling    Sets/Reps Detail 10 reps    Ability to Transfer Skills able to transfer skills from training to functional activity    Comment Pt  exhibits significant difficulty today with knee FLX ex. due to increased swelling.       AM-PAC (TM) - Mobility (Current Function)    Turning from your back to your side while in a flat bed without using bedrails? 3 - A Little    Moving from lying on your back to sitting on the side of a flat bed without using bedrails? 3 - A Little    Moving to and from a bed to a chair? 3 - A Little    Standing up from a chair using your arms? 3 - A Little    To walk in a hospital room? 3 - A Little    Climbing 3-5 steps with a railing? 3 - A Little    AM-PAC (TM) Mobility Score 18       PT Clinical Impression    Plan For Care Reviewed: Physical Therapy PT plan for care discussed with patient;patient voices agreement with PT plan for care;patient feedback incorporated in PT plan for care    System Pathology/Pathophysiology Noted musculoskeletal    Rehab Potential/Prognosis good, to achieve stated therapy goals    PT Frequency of Treatment 5-7 times per week    Problem List decreased flexibility;abnormal muscle tone;decreased ROM;decreased strength;pain;impaired balance    Activity Limitations Related to Problem List functional mobility not performed adequately or safely for community activity    Daily Outcome Statement Pt demonstrated improved functional mobility navigating the curb, stairs, and ambulating in the hallway. And despite incresaed swelling post op day 2, pt experiencing decreased pain levels at rest and activity. Pt will benefit from further skilled therapy to continue progressing functional mobility and performance addressing residual L knee pain, ROM and strength deficits.          Pain Assessment/Intervention  Pain Charting Type: Pain Assessment             Education provided this session. See the Patient Education summary report for full details.    PT Care Plan Goals      Most Recent Value   Stair Goal, PT   PT STG: Stairs  modified independence   PT STG: Number of Stairs  4   PT STG Assistive Device: Stairs   cane, straight   PT STG Date Established: Stairs  04/16/19   PT STG Duration: Stairs  7 days or less   PT STG Outcome: Stairs  goal ongoing      PT Care Plan Goals      Most Recent Value   Bed Mobility Goal   Date Goal Established: Bed Mobility  04/16/19   Time to Achieve Goal: Bed Mobility  5 - 7 days   Goal Activity: Bed Mobility  all bed mobility activities   Level of [place] Goal: Bed Mobility  modified independence   Goal Outcome: Bed Mobility  goal ongoing   Gait Goal   Date Goal Established: Gait Training  04/16/19   Time to Achieve Goal: Gait Training  5 - 7 days   Level of [place]  modified independence   Assistive Device: Gait Training  walker, front-wheeled   Distance Goal: Gait Training (feet)  150 feet   Goal Outcome: Gait Training  goal ongoing   Transfer Goal   Date Goal Established: Transfer Training  04/16/19   Time to Achieve Goal: Transfer Training  5 - 7 days   Goal Activity: Transfer Training  sit to stand/stand to sit   Level of [place] Goal: Transfer Training  modified independence   Assistive Device: Transfer Training  walker, front-wheeled   Goal Outcome: Transfer Training  goal ongoing   Physical Therapy Goal   Date Goal Established: Physical Therapy  04/16/19   Time to Achieve Goal: Physical Therapy  5 - 7 days   Goal Activity: Physical Therapy  Pt will perform a car transfer   Level of [place] Goal: Physical Therapy  modified independence   Goal Outcome: Physical Therapy  goal ongoing        Corinne Palombo SPTA  Ashleigh Jimenez, PTA

## 2019-04-18 NOTE — CONSULTS
UROLOGY CONSULT     Subjective    Roberto Stiles is a 73 y.o. male PMH Afib, mitral valve replacement on coumadin, HTN, DM and nephrolithiasis (known to Dr. Connell at [facility]) admitted following left total knee replacement on 4/16/19, now presenting with post operative urinary retention. He was voiding small volumes post op, requiring SC yesterday twice for 800-950cc each. Overnight he voided 100cc with , bacon placed this am for 1L. He denies LUTS at baseline without dysuria, frequency/urgency or hesitancy. No hematuria. He has been taking oxycodone every 4-6 hours, no BMs since surgery. He has been ambulating in the halls. No fever/chills. UA on 4/17 showed +glucose but no evidence of infection. Cr this am 1.0.     Past Medical History:   Diagnosis Date   • A-fib (CMS/HCC) ([facility])    • Arthritis     OA   • Bacterial endocarditis    • Deep vein thrombosis (CMS/HCC) ([facility])     LLE over 30 years ago   • History of transfusion     for MV replacement x34 years ago   • Hypertension    • Kidney stones    • Lipid disorder    • Myocardial infarction (CMS/HCC) ([facility])    • SCC (squamous cell carcinoma)     right ear   • Sleep apnea     wears CPAP   • Type 2 diabetes mellitus (CMS/HCC) (HCC)     last A1C 11/2018   • Venous insufficiency        Past Surgical History:   Procedure Laterality Date   • CARDIAC ELECTROPHYSIOLOGY MAPPING AND ABLATION     • CARDIAC SURGERY     • KIDNEY STONE SURGERY     • MITRAL VALVE REPLACEMENT     • TONSILLECTOMY         No Known Allergies      Current Facility-Administered Medications:   •  acetaminophen (TYLENOL) tablet 650 mg, 650 mg, oral, q4h PRN, Luann Tubbs CRNP, 650 mg at 04/18/19 0950  •  alum-mag hydroxide-simeth (MAALOX) 200-200-20 mg/5 mL suspension 30 mL, 30 mL, oral, q4h PRN, Luann Tubbs CRNP  •  aspirin enteric coated tablet 81 mg, 81 mg, oral, Daily, Luann Tubbs CRNP, 81 mg at 04/18/19 0820  •  bisacodyl (DULCOLAX) 10 mg suppository 10 mg, 10 mg, rectal,  Daily PRN, Luann Tubbs CRNP  •  carvedilol (COREG) tablet 12.5 mg, 12.5 mg, oral, BID with meals, Luann Tubbs CRNP, 12.5 mg at 04/18/19 0820  •  celecoxib (celeBREX) 200 mg capsule  - Pyxis Override Pull, , , ,   •  glucose chewable tablet 16-32 g of dextrose, 16-32 g of dextrose, oral, PRN **OR** dextrose 40 % oral gel 15-30 g of dextrose, 15-30 g of dextrose, oral, PRN **OR** glucagon (GLUCAGEN) injection 1 mg, 1 mg, intramuscular, PRN **OR** dextrose in water injection 12.5 g, 25 mL, intravenous, PRN, Luann Tubbs CRNP  •  diphenhydrAMINE (BENADRYL) capsule 25 mg, 25 mg, oral, q6h PRN **OR** diphenhydrAMINE (BENADRYL) injection 25 mg, 25 mg, intravenous, q6h PRN, Luann Tubbs CRNP  •  dofetilide (TIKOSYN) capsule 250 mcg, 250 mcg, oral, BID, Derrick Pascal MD, 250 mcg at 04/18/19 0820  •  empagliflozin-metformin 12.5-500 mg tablet 1 tablet, 1 tablet, oral, BID with meals, Luann Tubbs CRNP, 1 tablet at 04/18/19 0820  •  insulin aspart U-100 (NovoLOG) pen 6-10 Units, 6-10 Units, subcutaneous, With meals & nightly, Luann Tubbs CRNP, 6 Units at 04/17/19 2200  •  insulin detemir U-100 (LEVEMIR) FlexTouch pen 45 Units, 45 Units, subcutaneous, Nightly, Luann Tubbs CRNP, 45 Units at 04/17/19 2158  •  losartan (COZAAR) tablet 100 mg, 100 mg, oral, q PM, Luann Tubbs CRNP, 100 mg at 04/17/19 1740  •  oxyCODONE (ROXICODONE) immediate release tablet 5 mg, 5 mg, oral, q3h PRN, Robert Bhagat MD, 5 mg at 04/17/19 1758  •  oxyCODONE (ROXICODONE) immediate release tablet 5-10 mg, 5-10 mg, oral, q4h PRN, Luann Tubbs CRNP, 10 mg at 04/18/19 1230  •  polyethylene glycol (MIRALAX) 17 gram packet 17 g, 17 g, oral, Daily, Luann Tubbs CRNP, 17 g at 04/18/19 0819  •  potassium chloride (KLOR-CON) tablet extended release 20 mEq, 20 mEq, oral, Daily, Luann Tubbs CRNP, 20 mEq at 04/18/19 0819  •  rosuvastatin (CRESTOR) tablet 20 mg, 20 mg, oral, q PM, Luann Tubbs CRNP, 20 mg at  04/17/19 1740  •  sennosides-docusate sodium (SENOKOT-S) 8.6-50 mg per tablet 1 tablet, 1 tablet, oral, BID, Luann Tubbs CRNP, 1 tablet at 04/18/19 0820  •  tamsulosin (FLOMAX) 24 hr ER capsule 0.4 mg, 0.4 mg, oral, Daily, Luann Tubbs CRNP, 0.4 mg at 04/18/19 0819  •  trimethobenzamide (TIGAN) injection 200 mg, 200 mg, intramuscular, q8h PRN, Luann Tubbs CRNP, 200 mg at 04/16/19 1635  •  warfarin (COUMADIN) tablet 5 mg, 5 mg, oral, Once, Michelle Luna PA C    Social History     Social History   • Marital status:      Spouse name: N/A   • Number of children: N/A   • Years of education: N/A     Occupational History   • Not on file.     Social History Main Topics   • Smoking status: Never Smoker   • Smokeless tobacco: Never Used   • Alcohol use 1.8 oz/week     3 Shots of liquor per week   • Drug use: No   • Sexual activity: Defer     Other Topics Concern   • Not on file     Social History Narrative   • No narrative on file       History reviewed. No pertinent family history.      Review of Systems  Gen: No fevers, chills, or night sweats   Pulm: No SOB or cough  CV: No chest pain or palpitations  GI: No nausea/vomiting, +constipation  : see HPI   Neuro: no dizziness/lightheadedness  Msk: +Left knee pain/swelling   Psych: no depression/anxiety        Objective     Vitals:    04/18/19 0748 04/18/19 0817 04/18/19 1100 04/18/19 1330   BP:   (!) 113/54 (!) 109/55   BP Location:       Patient Position:       Pulse: 86  83 69   Resp:   18 18   Temp:   36.4 °C (97.5 °F) 36.2 °C (97.1 °F)   TempSrc:   Temporal Temporal   SpO2:  95%     Weight:       Height:           I/O last 3 completed shifts:  In: 960 [I.V.:960]  Out: 4300 [Urine:4300]  No intake/output data recorded.      Physicial Exam  GEN: NAD  HEENT: normocephalic, atraumatic   PULM: unlabored resp  CARDIAC: regular rate   ABD: soft, non-tender, non-distended   : Catheter draining clear yellow urine. Penis circumcised, testes non tender  without masses. No CVAT or SP tenderness. GASTON - prostate ~40g smooth, non tender without nodules.   EXT: No LE edema  NEURO: AAOX3      Labs  CBC Results       04/17/19 04/02/19 08/10/18                    0343 1040 0928         WBC 5.56 5.17 5.01         RBC 3.76 (L) 4.89 4.43 (L)         HGB 11.9 (L) 15.6 14.1         HCT 35.9 (L) 44.8 41.3         MCV 95.5 91.6 93.2         MCH 31.6 31.9 31.8         MCHC 33.1 34.8 34.1          (L) 148 (L) 105 (L)                     BMP Results       04/18/19 04/17/19 04/02/19                    0355 0343 1040          138 136         K 5.0 4.7 4.7         Cl 109 108 104         CO2 24 20 (L) 18 (L)         Glucose 137 (H) 114 (H) 152 (H)         BUN 18 19 25 (H)         Creatinine 1.0 1.0 1.1         Calcium 9.0 8.4 (L) 9.1         Anion Gap 10 10 14         EGFR &gt;60.0 &gt;60.0 &gt;60.0                     UA Results       04/17/19                          1637           Color Yellow           Clarity Clear           Glucose &gt;=1000 (A)           Bilirubin Negative           Ketones Trace (A)           Sp Grav 1.035 (H)           Blood Negative           Ph 5.5           Protein Negative           Urobilinogen 0.2           Nitrite Negative           Leuk Est Negative           WBC 0 TO 3           RBC 0 TO 4           Bacteria None Seen           Comment for Ketones at 1637 on 04/17/19:    Free sulfhydryl drugs such as Mesna, Capoten, and Acetylcysteine (Mucomyst) may cause false positive ketonuria.    Comment for Blood at 1637 on 04/17/19:    The sensitivity of the occult blood test is equivalent to approximately 4 intact RBC/HPF.    Comment for Leuk Est at 1637 on 04/17/19:    Results can be falsely negative due to high specific gravity, some antibiotics, glucose >3 g/dl, or WBC other than neutrophils.            Imaging  No  imaging       Assessment/Plan  Assessment   73 y.o. male with h/o nephrolithiasis and osteoarthritis s/p Left knee replacement on  4/16 presenting with post operative urinary retention  -Retention likely multifactorial as patient recently under general anesthesia, on narcotic pain medications without recent bowel movements   -UA on 4/17 without evidence of infection.   -Continue bacon catheter.  -Flomax 0.4mg daily  -Limit narcotics as able  -Bowel regimen- colace, miralax, dulcolax suppository   -Ambulate  -Patient would prefer to have voiding trial prior to discharge. May consider void trial 4/19, however patient counseled that if he fails would require leg bag/outpatient voiding trial next week.     Robert Hernandez MD  PGY-3, [facility]  Pager# [phone]

## 2019-04-18 NOTE — UM PHYSICIAN REVIEW NOTE
Utilization Secondary Review Note      Patient Name: Roberto Stiles      MRN: [id number]  Insurance: MEDICARE  Admission date: 4/16/2019  Initial order:    Inpatient  Planned admission: Yes  Post Discharge Review: No            Inpatient services are appropriate for this 73 y.o.  male with significant PMHx of Afib on coumadin admitted for planned L TKA. Patient's INR monitored during hospitalization and start on PRN pain meds for pain control. PT/OT consulted. Patient has required > 2 MN of hospital level     Afshan Odom DO  4/18/2019

## 2019-04-18 NOTE — PLAN OF CARE
Problem: Patient Care Overview  Goal: Plan of Care Review  Outcome: Ongoing (interventions implemented as appropriate)   04/18/19 5257   Coping/Psychosocial   Plan Of Care Reviewed With patient   Plan of Care Review   Progress improving   Outcome Summary Patient oobx1, taking 10mg oxycodone for pain control, unable to void overnight,- bacon catheter placed per ortho, plan for DC home        Problem: Knee Arthroplasty (Total, Partial) (Adult)  Goal: Signs and Symptoms of Listed Potential Problems Will be Absent, Minimized or Managed (Knee Arthroplasty)  Outcome: Ongoing (interventions implemented as appropriate)

## 2019-04-18 NOTE — PROGRESS NOTES
Orthopaedic Surgery Progress Note:     NAVYA   Pain controlled    Vitals:    04/18/19 0343   BP: (!) 154/73   Pulse: 83   Resp: 18   Temp: 36.4 °C (97.5 °F)   SpO2:        Dressing clean/dry/intact  Motor intact to EHL/PF/DF  SILT all distributions  Palpable DP    A/P s/p L TKA  -PT/OT  -Pain control  -DVT PPX

## 2019-04-18 NOTE — PLAN OF CARE
Problem: Patient Care Overview  Goal: Plan of Care Review  Outcome: Ongoing (interventions implemented as appropriate)   04/18/19 1145   Coping/Psychosocial   Plan Of Care Reviewed With patient   Plan of Care Review   Progress progress toward functional goals as expected   Outcome Summary Pt demonstrated improved functional mobility navigating the curb, stairs, and ambulating in the hallway. And despite incresaed swelling post op day 2, pt experiencing decreased pain levels at rest and activity. Pt will benefit from further skilled therapy to continue progressing functional mobility and performance addressing residual L knee pain, ROM and strength deficits.       Problem: Knee Arthroplasty (Total, Partial) (Adult)  Goal: Signs and Symptoms of Listed Potential Problems Will be Absent, Minimized or Managed (Knee Arthroplasty)  Outcome: Ongoing (interventions implemented as appropriate)   04/18/19 1145   Knee Arthroplasty (Total, Partial)   Problems Assessed (Knee Arthroplasty) decreased range of motion;functional deficit;pain   Problems Present (Knee Arthroplasty) decreased range of motion;functional deficit;pain

## 2019-04-18 NOTE — PROGRESS NOTES
Hospital Medicine Service -  Daily Progress Note       SUBJECTIVE   Interval History: Still having difficulty urination and Puckett placed  Left knee pain is improving     OBJECTIVE      Vital signs in last 24 hours:  Temp:  [36.1 °C (97 °F)-37.2 °C (98.9 °F)] 37.1 °C (98.8 °F)  Heart Rate:  [78-89] 86  Resp:  [16-18] 18  BP: (107-154)/(56-79) 132/75    Intake/Output Summary (Last 24 hours) at 04/18/19 1005  Last data filed at 04/18/19 0626   Gross per 24 hour   Intake                0 ml   Output             3025 ml   Net            -3025 ml       PHYSICAL EXAMINATION      Physical Exam   General appearance: alert, appears stated age, cooperative, non-toxic  Head: normocephalic, without obvious abnormality, atraumatic  Eyes: conjunctivae clear. PERRL, EOMI's intact.  Lungs: clear to auscultation bilaterally   Heart: regular rate and rhythm, S1, S2 normal,   Abdomen: Nondistended, +BS, soft, non-tender, no masses palpable   Extremities: left knee is dressed  Pulses: 2+ and symmetric B/L DP  Neurologic: Alert and oriented X 3, no focal deficits  Skin: intact, no rashes or lesions       LINES, CATHETERS, DRAINS, AIRWAYS, AND WOUNDS   Lines, Drains, Airways, Wounds:  Peripheral IV 04/16/19 Left Hand (Active)   Number of days: 2       Urethral Catheter 16 Fr (Active)   Number of days: 0       Surgical Incision Knee Left (Active)   Number of days: 2       Comments:   Puckett placed for acute urinary retention   LABS / IMAGING / TELE      Labs  Lab Results   Component Value Date    WBC 5.56 04/17/2019    HGB 11.9 (L) 04/17/2019    HCT 35.9 (L) 04/17/2019     (L) 04/17/2019    ALT 30 07/06/2015    AST 24 07/06/2015     04/18/2019    K 5.0 04/18/2019     04/18/2019    CREATININE 1.0 04/18/2019    BUN 18 04/18/2019    CO2 24 04/18/2019    TSH 1.78 07/06/2015    INR 2.2 04/18/2019    HGBA1C 7.2 (H) 04/02/2019       Imaging  I have independently reviewed the pertinent imaging from the last 24  hrs.    ECG/Telemetry  I have independently reviewed the telemetry. No events for the last 24 hours.    ASSESSMENT AND PLAN      * S/P total knee arthroplasty, left   Assessment & Plan    S/P L total knee, POD# 2  Pain control per orthopedics  DVT ppx per orthopedics  PT/OT  Encourage IS  Bowel regimen  Labs stable     Urinary retention   Assessment & Plan    Required straight cath few times and now bacon placed  Continue  Laxatives  Out of bed and ambulate  Continue flomax  Continue bowel regimen  Urology consulted       History of heart valve replacement with bioprosthetic valve   Assessment & Plan    HO Raine Shiley mechanical MV  Records show stable on recent JOSELIN w/ nml valve function. Record indicated EF 50%.  inr goal is 2.5-3.5       HLD (hyperlipidemia)   Assessment & Plan    HOLD Fish oil for now  Continue statin     Atrial fibrillation (CMS/[facility])   Assessment & Plan    S/P Ablation for AF/VT. Follows with Dr. Teran, [facility]  S/P VT ablation Jan 2019.  On Tikosyn, Coumadin and Coreg  INR 1.9 on 4/16 and went upto 2.4 on 4/17 - ortho is concerned about the sudden jump and yesterdays dose held-   INR is 2.2 today- ok to give 5 mg today- d/w Michelle Valencia wnl  ekg from yesterday- qtc is 522- avoid qt prolonging drugs- check rpt ekg today  Continue Coreg, Coumadin, and Tikosyn     Essential hypertension   Assessment & Plan    On Coreg, Lasix and Cozaar  HOLD Lasix.  Continue Coreg and Cozaar.  Will monitor BP closely     Type 2 diabetes mellitus, with long-term current use of insulin (CMS/[facility])   Assessment & Plan    Type 2 DM, on Synjardy, Ozempic, and Levemir  HgbA1C in pre op labs noted to be 7.2  Continue oral agents and Levemir  SSI while in hospital  Monitor accuchecks  NCS diet.  Sugars stable     SANA on CPAP   Assessment & Plan    Reportedly on CPAP at home at night  Continue CPAP qhs per home settings  Monitor on SANA protocol          VTE Assessment: Padua    VTE Prophylaxis Plan: coumadin  Code Status:  Full Code  Estimated Discharge Date: 4/18/2019  Disposition Planning: when ok with ortho     Osmani Nicholas MD  4/18/2019

## 2019-04-18 NOTE — NURSING NOTE
Patient DTV at 0030.  Patient attempted to void x2. Only able to void 100cc total since last straight cath.  Bladder scanned patient for >798cc.  Notified Dr. Bhagat, ortho resident.  Given orders to insert bacon catheter.  Bacon catheter inserted.  1000cc clear yellow urine returned.

## 2019-04-18 NOTE — PATIENT CARE CONFERENCE
Care Progression Rounds Note  Date: 4/18/2019  Time: 10:16 AM     Patient Name: Roberto Stiles     Medical Record Number: [id number]   YOB: 1945  Sex: Male      Room/Bed: 5450    Admitting Diagnosis: Osteoarthritis of left knee, unspecified osteoarthritis type [M17.12]  S/P total knee arthroplasty, left [Z96.652]   Admit Date/Time: 4/16/2019  9:43 AM    Primary Diagnosis: S/P total knee arthroplasty, left  Principal Problem: S/P total knee arthroplasty, left    GMLOS: pending  Anticipated Discharge Date: 4/19/2019    AM-PAC  Mobility Score: 18    Discharge Planning:  Living Arrangements: house  Concerns To Be Addressed: no discharge needs identified, denies needs/concerns at this time  Anticipated Discharge Disposition: home with outpatient services  Type of Outpatient Services: physical therapy    Barriers to Discharge:  Barriers to Discharge: Medical issues not resolved    Participants:  advanced practice provider, , nursing, physical therapy, social work/services

## 2019-04-19 VITALS
TEMPERATURE: 98.5 F | HEIGHT: 72 IN | HEART RATE: 85 BPM | OXYGEN SATURATION: 94 % | BODY MASS INDEX: 32.51 KG/M2 | WEIGHT: 240 LBS | SYSTOLIC BLOOD PRESSURE: 138 MMHG | RESPIRATION RATE: 20 BRPM | DIASTOLIC BLOOD PRESSURE: 64 MMHG

## 2019-04-19 LAB
CASE RPRT: NORMAL
CLINICAL INFO: NORMAL
GLUCOSE BLD-MCNC: 121 MG/DL (ref 70–99)
GLUCOSE BLD-MCNC: 169 MG/DL (ref 70–99)
INR PPP: 1.6 INR
PATH REPORT.FINAL DX SPEC: NORMAL
PATH REPORT.GROSS SPEC: NORMAL
POCT TEST: ABNORMAL
POCT TEST: ABNORMAL
PROTHROMBIN TIME: 18.3 SEC (ref 12.2–14.5)

## 2019-04-19 PROCEDURE — 63700000 HC SELF-ADMINISTRABLE DRUG: Performed by: HOSPITALIST

## 2019-04-19 PROCEDURE — 63700000 HC SELF-ADMINISTRABLE DRUG: Performed by: STUDENT IN AN ORGANIZED HEALTH CARE EDUCATION/TRAINING PROGRAM

## 2019-04-19 PROCEDURE — 97116 GAIT TRAINING THERAPY: CPT | Mod: GP

## 2019-04-19 PROCEDURE — 85610 PROTHROMBIN TIME: CPT | Performed by: NURSE PRACTITIONER

## 2019-04-19 PROCEDURE — 97530 THERAPEUTIC ACTIVITIES: CPT | Mod: GP

## 2019-04-19 PROCEDURE — 99232 SBSQ HOSP IP/OBS MODERATE 35: CPT | Performed by: HOSPITALIST

## 2019-04-19 PROCEDURE — 97110 THERAPEUTIC EXERCISES: CPT | Mod: GP

## 2019-04-19 PROCEDURE — 63600000 HC DRUGS/DETAIL CODE: Performed by: NURSE PRACTITIONER

## 2019-04-19 PROCEDURE — 36415 COLL VENOUS BLD VENIPUNCTURE: CPT | Performed by: NURSE PRACTITIONER

## 2019-04-19 PROCEDURE — 63700000 HC SELF-ADMINISTRABLE DRUG: Performed by: NURSE PRACTITIONER

## 2019-04-19 RX ORDER — WARFARIN SODIUM 5 MG/1
5 TABLET ORAL ONCE
Status: COMPLETED | OUTPATIENT
Start: 2019-04-19 | End: 2019-04-19

## 2019-04-19 RX ORDER — ENOXAPARIN SODIUM 100 MG/ML
40 INJECTION SUBCUTANEOUS ONCE
Status: COMPLETED | OUTPATIENT
Start: 2019-04-19 | End: 2019-04-19

## 2019-04-19 RX ORDER — TAMSULOSIN HYDROCHLORIDE 0.4 MG/1
0.4 CAPSULE ORAL DAILY
Qty: 30 CAPSULE | Refills: 0 | Status: SHIPPED | OUTPATIENT
Start: 2019-04-19 | End: 2023-12-12

## 2019-04-19 RX ADMIN — OXYCODONE HYDROCHLORIDE 10 MG: 5 TABLET ORAL at 11:27

## 2019-04-19 RX ADMIN — CARVEDILOL 12.5 MG: 12.5 TABLET, FILM COATED ORAL at 08:54

## 2019-04-19 RX ADMIN — BISACODYL 10 MG: 10 SUPPOSITORY RECTAL at 08:58

## 2019-04-19 RX ADMIN — POLYETHYLENE GLYCOL 3350 17 G: 17 POWDER, FOR SOLUTION ORAL at 08:51

## 2019-04-19 RX ADMIN — DOFETILIDE 250 MCG: 0.25 CAPSULE ORAL at 08:54

## 2019-04-19 RX ADMIN — LOSARTAN POTASSIUM 100 MG: 100 TABLET, FILM COATED ORAL at 17:33

## 2019-04-19 RX ADMIN — ENOXAPARIN SODIUM 40 MG: 100 INJECTION SUBCUTANEOUS at 13:19

## 2019-04-19 RX ADMIN — CARVEDILOL 12.5 MG: 12.5 TABLET, FILM COATED ORAL at 17:33

## 2019-04-19 RX ADMIN — WARFARIN SODIUM 5 MG: 5 TABLET ORAL at 17:32

## 2019-04-19 RX ADMIN — OXYCODONE HYDROCHLORIDE 10 MG: 5 TABLET ORAL at 16:32

## 2019-04-19 RX ADMIN — ROSUVASTATIN CALCIUM 20 MG: 20 TABLET, FILM COATED ORAL at 17:33

## 2019-04-19 RX ADMIN — TAMSULOSIN HYDROCHLORIDE 0.4 MG: 0.4 CAPSULE ORAL at 08:54

## 2019-04-19 RX ADMIN — POTASSIUM CHLORIDE 20 MEQ: 750 TABLET, FILM COATED, EXTENDED RELEASE ORAL at 08:54

## 2019-04-19 RX ADMIN — SENNOSIDES AND DOCUSATE SODIUM 1 TABLET: 8.6; 5 TABLET ORAL at 08:54

## 2019-04-19 RX ADMIN — OXYCODONE HYDROCHLORIDE 10 MG: 5 TABLET ORAL at 07:25

## 2019-04-19 RX ADMIN — ASPIRIN 81 MG: 81 TABLET, COATED ORAL at 08:54

## 2019-04-19 ASSESSMENT — COGNITIVE AND FUNCTIONAL STATUS - GENERAL
STANDING UP FROM CHAIR USING ARMS: 3 - A LITTLE
MOVING TO AND FROM BED TO CHAIR: 3 - A LITTLE
CLIMB 3 TO 5 STEPS WITH RAILING: 3 - A LITTLE
STANDING UP FROM CHAIR USING ARMS: 3 - A LITTLE
WALKING IN HOSPITAL ROOM: 3 - A LITTLE
WALKING IN HOSPITAL ROOM: 3 - A LITTLE
CLIMB 3 TO 5 STEPS WITH RAILING: 3 - A LITTLE
MOVING TO AND FROM BED TO CHAIR: 3 - A LITTLE

## 2019-04-19 NOTE — PROGRESS NOTES
Plans to return home at discharge. Has appointment at [facility] on Monday for out patient physical therapy. No further discharge planning needs identified at this time.

## 2019-04-19 NOTE — PLAN OF CARE
Problem: Patient Care Overview  Goal: Plan of Care Review  Outcome: Ongoing (interventions implemented as appropriate)   04/19/19 1333   Coping/Psychosocial   Plan Of Care Reviewed With patient   Plan of Care Review   Progress improving   Outcome Summary Patients pain well controlled with current pain regimen. Puckett draining without difficulty. Wants to go home at discharge. Possible void trial today       Problem: Knee Arthroplasty (Total, Partial) (Adult)  Goal: Signs and Symptoms of Listed Potential Problems Will be Absent, Minimized or Managed (Knee Arthroplasty)  Outcome: Ongoing (interventions implemented as appropriate)

## 2019-04-19 NOTE — PROGRESS NOTES
Urology Daily Progress Note     Subjective     No issues overnight, pain better controlled and did not require oxycodone last night. Had BM yesterday. Ambulating. Catheter draining well without issue.    UOP 1.1L    Objective     Vitals:    04/18/19 2111 04/18/19 2214 04/18/19 2256 04/19/19 0557   BP:  132/63  131/62   BP Location:  Right upper arm  Right upper arm   Patient Position:  Lying  Lying   Pulse:  88 85 84   Resp:  18 18   Temp:  37.2 °C (98.9 °F)  37.2 °C (98.9 °F)   TempSrc:  Temporal  Temporal   SpO2: 95% 95%     Weight:       Height:           I/O last 3 completed shifts:  In: -   Out: 3525 [Urine:3525]  I/O this shift:  In: -   Out: 2200 [Urine:2200]      Physicial Exam  GEN: NAD  HEENT: normocephalic, atraumatic   PULM: unlabored resp  CARDIAC: regular rate   ABD: soft, non-tender, non-distended   : bacon draining clear yellow urine   EXT: No LE edema  NEURO: AAOX3      Labs  CBC Results       04/17/19 04/02/19 08/10/18                    0343 1040 0928         WBC 5.56 5.17 5.01         RBC 3.76 (L) 4.89 4.43 (L)         HGB 11.9 (L) 15.6 14.1         HCT 35.9 (L) 44.8 41.3         MCV 95.5 91.6 93.2         MCH 31.6 31.9 31.8         MCHC 33.1 34.8 34.1          (L) 148 (L) 105 (L)                     BMP Results       04/18/19 04/17/19 04/02/19                    0355 0343 1040          138 136         K 5.0 4.7 4.7         Cl 109 108 104         CO2 24 20 (L) 18 (L)         Glucose 137 (H) 114 (H) 152 (H)         BUN 18 19 25 (H)         Creatinine 1.0 1.0 1.1         Calcium 9.0 8.4 (L) 9.1         Anion Gap 10 10 14         EGFR &gt;60.0 &gt;60.0 &gt;60.0                     UA Results       04/17/19                          1637           Color Yellow           Clarity Clear           Glucose &gt;=1000 (A)           Bilirubin Negative           Ketones Trace (A)           Sp Grav 1.035 (H)           Blood Negative           Ph 5.5           Protein Negative            Urobilinogen 0.2           Nitrite Negative           Leuk Est Negative           WBC 0 TO 3           RBC 0 TO 4           Bacteria None Seen           Comment for Ketones at 1637 on 04/17/19:    Free sulfhydryl drugs such as Mesna, Capoten, and Acetylcysteine (Mucomyst) may cause false positive ketonuria.    Comment for Blood at 1637 on 04/17/19:    The sensitivity of the occult blood test is equivalent to approximately 4 intact RBC/HPF.    Comment for Leuk Est at 1637 on 04/17/19:    Results can be falsely negative due to high specific gravity, some antibiotics, glucose >3 g/dl, or WBC other than neutrophils.                Assessment/Plan  Assessment   73 y.o. male with h/o nephrolithiasis and osteoarthritis s/p Left knee replacement on 4/16 presenting with post operative urinary retention  -Retention likely multifactorial as patient recently under general anesthesia, on narcotic pain medications without recent bowel movements   -UA on 4/17 without evidence of infection.   -D/c bacon this am for voiding trial. Check PVR.  -If fails voiding trial discharge with bacon/leg bag and can f/u next week with Dr. Tavarez for void trial in office. Otherwise can follow up as needed.   -continue Flomax 0.4mg daily through discharge   -Limit narcotics as able  -Bowel regimen- colace, miralax, dulcolax suppository   -Ambulate     Robert Hernandez MD  PGY-3, [facility]  Pager# [phone]

## 2019-04-19 NOTE — PROGRESS NOTES
Patient: Roberto Stiles  Location: [facility] 5PAV 5450  MRN: [id number]  Today's date: 4/19/2019    Pt left in recliner chair with LE's elevated in NAD. Call bell and phone within reach. Nurse notified. Anticipate ambulating with pt later to continue mobilizing his knee and promoting activity tolerance prior to DC home.    Hospital Course  Sedrick is a 73 y.o. male admitted on 4/16/2019 with Osteoarthritis of left knee, unspecified osteoarthritis type [M17.12]  S/P total knee arthroplasty, left [Z96.652]. Principal problem is S/P total knee arthroplasty, left.    Sedrick has a past medical history of A-fib (CMS/HCC) ([facility]); Arthritis; Bacterial endocarditis; Deep vein thrombosis (CMS/HCC) ([facility]); History of transfusion; Hypertension; Kidney stones; Lipid disorder; Myocardial infarction (CMS/HCC) ([facility]); SCC (squamous cell carcinoma); Sleep apnea; Type 2 diabetes mellitus (CMS/HCC) ([facility]); and Venous insufficiency.          Therapy Pain/Vitals - 04/19/19 0853        Pain/Comfort/Sleep    Pain Charting Type Pain Assessment    Presence of Pain complains of pain/discomfort    Preferred Pain Scale number (Numeric Rating Pain Scale)    Pain Body Location - Side Left    Pain Body Location knee    Pain Rating (0-10): Rest 3    Pain Rating (0-10): Activity 3    Pain Management Interventions position adjusted          Prior Living Environment      Most Recent Value   Lives With  spouse   Living Arrangements  house   Living Environment Comment  multistory home, has first floor set up (basement), enter through the back, 2 EZEKIEL to enter, stall shower, 13 EZEKIEL to second floor   Equipment Currently Used at Home  none          Prior Level of Function      Most Recent Value   Ambulation  independent   Transferring  independent   Toileting  independent   Bathing  independent   Dressing  independent   Eating  independent   Communication  understands/communicates without difficulty   Swallowing  swallows foods/liquids without  difficulty   Equipment Currently Used at Home  none   Prior Functional Level Comment  independent prior to surgery                PT Treatment Summary - 04/19/19 0853        Session Details    Document Type daily treatment    Mode of Treatment individual therapy;physical therapy    Patient/Family Observations Sitting up in bed. Recieving medications from nurse       Time Calculation    Start Time 0853    Stop Time 0925    Time Calculation (min) 32 min       General Information    Patient Profile Reviewed? yes    Onset of Illness/Injury or Date of Surgery 04/16/19    Referring Physician Joshua Vinson    Pertinent History of Current Functional Problem L TKA    Existing Precautions/Restrictions fall;weight bearing       Weight Bearing Status    Left LE Weight Bearing Status weight bearing as tolerated       Orientation Log    Comment A&Ox3       Cognition/Psychosocial    Safety Awareness intact       Attention    Behavioral Observations WNL, no concerns       Safety Awareness/Health Promotion    Fall Prevention demonstrates safety awareness related to fall risk;implements strategies to prevent falls       Range of Motion (ROM)    General Range of Motion no range of motion deficits identified       General LE Assessment    Lower Extremity: Range of Motion knee, left: LE ROM deficit       Lower Extremity    Lower Extremity: Manual Muscle Testing left knee strength deficit       Bed Mobility    Bed Mobility supine to sit    Assistive Device (Bed Mobility) head of bed elevated;bed rails    Comment (Bed Mobility) Pt performs supine<>sit transfer at Mod I level. Utilizes bed railing minimally to assist pulling himself to the EOB.       Transfers    Maintains Weight Bearing Status (Transfers) able to maintain weight bearing status    Comment Performs STS transfers from recliner chair and bed<>RW and vice versa at DS level.         Sit to Stand Transfer    [place], Sit to Stand Transfer supervision    Verbal Cues proper  use of assistive device;safety    Assistive Device walker, front-wheeled    Comment Pt requires minimal verbal cueing to avoid pulling up on the RW while performing STStand from lower surfaces (bed, car).        Stand to Sit Transfer    [place], Stand to Sit Transfer supervision    Verbal Cues safety    Assistive Device walker, front-wheeled    Comment Pt descends at a faster pace than ideal during STSit. S recommended for safety.       Car Transfer    [place], Car Transfer distant supervision    Assistive Device walker, front-wheeled    Comment Pt performed car transfer with minimal cueing to not pull up from the RW when standing up from the car seat.       Gait Analysis/Training    Gait/Stairs Locomotion Gait Training (Group);Stairs Training (Group)       Gait Training    [place], Gait distant supervision    Assistive Device walker, front-wheeled    Distance in Feet 400 feet    Gait Pattern Utilized 2-point    Gait Deviations Identified left;antalgic;decreased brenden;decreased gait speed;decreased weight shifting    Maintains Weight Bearing Status able to maintain weight bearing status    Comment Pt ambulated 400 ft in RW under DS. Pt takes short standing breaks when needed. Pt also demonstrated increased fluidity of gait and decreased physical exertion to perform task.       Stairs Training    [place], Stairs supervision    Assistive Device cane, straight    Handrail Location right side (ascending);left side (descending)    Number of Stairs 4   2x    Ascending Stairs Technique step-to-step    Descending Stairs Technique step-to-step    Comment Pt navigated 4 steps 2x to improve sequencing and technique. Stable and minimal exertion demonstrated throughout activity       AM-PAC (TM) - Mobility (Current Function)    Turning from your back to your side while in a flat bed without using bedrails? 3 - A Little    Moving from lying on your back to sitting on the side of a flat bed without using  bedrails? 3 - A Little    Moving to and from a bed to a chair? 3 - A Little    Standing up from a chair using your arms? 3 - A Little    To walk in a hospital room? 3 - A Little    Climbing 3-5 steps with a railing? 3 - A Little    AM-PAC (TM) Mobility Score 18       PT Clinical Impression    Plan For Care Reviewed: Physical Therapy PT plan for care discussed with patient;patient voices agreement with PT plan for care;patient feedback incorporated in PT plan for care    System Pathology/Pathophysiology Noted musculoskeletal    Rehab Potential/Prognosis good, to achieve stated therapy goals    PT Frequency of Treatment 5-7 times per week    Problem List abnormal muscle tone;decreased ROM;decreased flexibility;decreased strength;impaired balance;pain    Activity Limitations Related to Problem List functional mobility not performed adequately or safely for community activity    Anticipated Equipment Needs at Discharge front wheeled walker    Daily Outcome Statement Pt demonstrated improved car transfer, stair, jannette ambulation performance at [facility].  Pt will beneift from outpatient services to address residual knee ROM and strenth deficits, balance capabilities and to normalize his gait pattern for progression of functional mobility.            Pain Assessment/Intervention  Pain Charting Type: Pain Assessment             Education provided this session. See the Patient Education summary report for full details.    PT Care Plan Goals      Most Recent Value   Stair Goal, PT   PT STG: Stairs  modified independence   PT STG: Number of Stairs  4   PT STG Assistive Device: Stairs  cane, straight   PT STG Date Established: Stairs  04/16/19   PT STG Duration: Stairs  7 days or less   PT STG Outcome: Stairs  goal ongoing      PT Care Plan Goals      Most Recent Value   Bed Mobility Goal   Date Goal Established: Bed Mobility  04/16/19   Time to Achieve Goal: Bed Mobility  5 - 7 days   Goal Activity: Bed Mobility  all bed mobility  activities   Level of [place] Goal: Bed Mobility  modified independence   Goal Outcome: Bed Mobility  goal ongoing   Gait Goal   Date Goal Established: Gait Training  04/16/19   Time to Achieve Goal: Gait Training  5 - 7 days   Level of [place]  modified independence   Assistive Device: Gait Training  walker, front-wheeled   Distance Goal: Gait Training (feet)  150 feet   Goal Outcome: Gait Training  goal ongoing   Transfer Goal   Date Goal Established: Transfer Training  04/16/19   Time to Achieve Goal: Transfer Training  5 - 7 days   Goal Activity: Transfer Training  sit to stand/stand to sit   Level of [place] Goal: Transfer Training  modified independence   Assistive Device: Transfer Training  walker, front-wheeled   Goal Outcome: Transfer Training  goal ongoing   Physical Therapy Goal   Date Goal Established: Physical Therapy  04/16/19   Time to Achieve Goal: Physical Therapy  5 - 7 days   Goal Activity: Physical Therapy  Pt will perform a car transfer   Level of [place] Goal: Physical Therapy  modified independence   Goal Outcome: Physical Therapy  goal ongoing        Corinne Palombo SPTA

## 2019-04-19 NOTE — PLAN OF CARE
Problem: Patient Care Overview  Goal: Plan of Care Review  Outcome: Ongoing (interventions implemented as appropriate)   04/19/19 1551   Coping/Psychosocial   Plan Of Care Reviewed With patient;spouse   Plan of Care Review   Progress progress toward functional goals as expected   Outcome Summary Pt exhibits significant ROM deficits due to swelling and reduced quadriceps activation, however, he continues to improve his functional mobility. activity tolerance, and need for UE support. Pt will benefit from further skilled intervention to increase knee ROM FLX and EXT for improved ADL's and ambulatory performance.       Problem: Knee Arthroplasty (Total, Partial) (Adult)  Goal: Signs and Symptoms of Listed Potential Problems Will be Absent, Minimized or Managed (Knee Arthroplasty)  Outcome: Ongoing (interventions implemented as appropriate)   04/19/19 1551   Knee Arthroplasty (Total, Partial)   Problems Assessed (Knee Arthroplasty) decreased range of motion;functional deficit;pain   Problems Present (Knee Arthroplasty) decreased range of motion;functional deficit;pain

## 2019-04-19 NOTE — DISCHARGE INSTRUCTIONS
DVT Prophylaxis:   -Continue with home coumadin and aspirin.   - Please check your INRs at home daily until INR consistently 2-2.5. Continue home coumadin upon discharge. Last INR on Friday was 1.6. Contact your cardiologist Dr. Teran's office for assistance with coumadin dosing.     Incision Care  -Please remove your surgical dressing on 4/21/2019. At that time if the wound is dry and not draining, you can leave it uncovered, open to air. If there is drainage, please place 4x4s covered by paper tape over your incision.  -Please do not submerge incision in water, no baths, no swimming, no pools, no hot tubs, etc.   -Please keep incision clean and dry. Once your dressing has been removed, do not scrub the incision in the shower and pat dry with a clean towel not used to dry your body.   -Please make sure your incision(s) are checked for signs and symptoms of infection: If any of the below should occur, please call the office:   -drainage from incision site, opening of incision, increased redness and/or tenderness, fevers greater than 101, flu-like symptoms.   -Please call office or go to ED with any thigh/calf pain or swelling in legs, chest pain, chest congestion, problems with breathing.     Constipation/Pain Medication:   -Take your pain medication with food. Do not drive while taking pain medication.   -Pain medications may cause constipation.   -If you have not had a bowel movement in 3 days please call the office   - Please take Senna-Colace as prescribed. Hold for loose stool. If you are having difficulties having a bowel   movement or haven't had bowel movement in 2 days, please add over the counter miralax and take as directed on package.   -Drink plenty of fluids and eat fresh fruits and vegetables.   -DO NOT SMOKE! Smoking is not healthy for your healing process.       Call [phone] ([place] office) on Monday to schedule an appointment with Dr. Bosch (urologist) next week for catheter removal.      Indwelling Urinary Catheter Care, Adult  Take good care of your catheter to keep it working and to prevent problems.  How to wear your catheter  Attach your catheter to your leg with tape (adhesive tape) or a leg strap. Make sure it is not too tight. If you use tape, remove any bits of tape that are already on the catheter.  How to wear a drainage bag  You should have:  · A large overnight bag.  · A small leg bag.  Overnight Bag  You may wear the overnight bag at any time. Always keep the bag below the level of your bladder but off the floor. When you sleep, put a clean plastic bag in a wastebasket. Then hang the bag inside the wastebasket.  Leg Bag  Never wear the leg bag at night. Always wear the leg bag below your knee. Keep the leg bag secure with a leg strap or tape.  How to care for your skin  · Clean the skin around the catheter at least once every day.  · Shower every day. Do not take baths.  · Put creams, lotions, or ointments on your genital area only as told by your doctor.  · Do not use powders, sprays, or lotions on your genital area.  How to clean your catheter and your skin  1. Wash your hands with soap and water.  2. Wet a washcloth in warm water and gentle (mild) soap.  3. Use the washcloth to clean the skin where the catheter enters your body. Clean downward and wipe away from the catheter in small circles. Do not wipe toward the catheter.  4. Pat the area dry with a clean towel. Make sure to clean off all soap.  How to care for your drainage bags  Empty your drainage bag when it is ?-½ full or at least 2-3 times a day. Replace your drainage bag once a month or sooner if it starts to smell bad or look dirty. Do not clean your drainage bag unless told by your doctor.  Emptying a drainage bag    Supplies Needed  · Rubbing alcohol.  · Gauze pad or cotton ball.  · Tape or a leg strap.  Steps  1. Wash your hands with soap and water.  2. Separate (detach) the bag from your leg.  3. Hold the bag over  the toilet or a clean container. Keep the bag below your hips and bladder. This stops pee (urine) from going back into the tube.  4. Open the pour spout at the bottom of the bag.  5. Empty the pee into the toilet or container. Do not let the pour spout touch any surface.  6. Put rubbing alcohol on a gauze pad or cotton ball.  7. Use the gauze pad or cotton ball to clean the pour spout.  8. Close the pour spout.  9. Attach the bag to your leg with tape or a leg strap.  10. Wash your hands.  Changing a drainage bag  Supplies Needed  · Alcohol wipes.  · A clean drainage bag.  · Adhesive tape or a leg strap.  Steps  1. Wash your hands with soap and water.  2. Separate the dirty bag from your leg.  3. Pinch the rubber catheter with your fingers so that pee does not spill out.  4. Separate the catheter tube from the drainage tube where these tubes connect (at the connection valve). Do not let the tubes touch any surface.  5. Clean the end of the catheter tube with an alcohol wipe. Use a different alcohol wipe to clean the end of the drainage tube.  6. Connect the catheter tube to the drainage tube of the clean bag.  7. Attach the new bag to the leg with adhesive tape or a leg strap.  8. Wash your hands.  How to prevent infection and other problems  · Never pull on your catheter or try to remove it. Pulling can damage tissue in your body.  · Always wash your hands before and after touching your catheter.  · If a leg strap gets wet, replace it with a dry one.  · Drink enough fluids to keep your pee clear or pale yellow, or as told by your doctor.  · Do not let the drainage bag or tubing touch the floor.  · Wear cotton underwear.  · If you are female, wipe from front to back after you poop (have a bowel movement).  · Check on the catheter often to make sure it works and the tubing is not twisted.  Get help if:  · Your pee is cloudy.  · Your pee smells unusually bad.  · Your pee is not draining into the bag.  · Your tube gets  clogged.  · Your catheter starts to leak.  · Your bladder feels full.  Get help right away if:  · You have redness, swelling, or pain where the catheter enters your body.  · You have fluid, pus, or a bad smell coming from the area where the catheter enters your body.  · The area where the catheter enters your body feels warm.  · You have a fever.  · You have pain in your:  ¨ Stomach (abdomen).  ¨ Legs.  ¨ Lower back.  ¨ Bladder.  · You see blood fill the catheter.  · Your pee is pink or red.  · You feel sick to your stomach (nauseous).  · You throw up (vomit).  · You have chills.  · Your catheter gets pulled out.  This information is not intended to replace advice given to you by your health care provider. Make sure you discuss any questions you have with your health care provider.  Document Released: 04/14/2014 Document Revised: 11/15/2017 Document Reviewed: 06/02/2015  [email or web address] Interactive Patient Education © 2018 [employer]

## 2019-04-19 NOTE — PATIENT CARE CONFERENCE
Care Progression Rounds Note  Date: 4/19/2019  Time: 10:12 AM     Patient Name: Roberto Stiles     Medical Record Number: [id number]   YOB: 1945  Sex: Male      Room/Bed: 5450    Admitting Diagnosis: Osteoarthritis of left knee, unspecified osteoarthritis type [M17.12]  S/P total knee arthroplasty, left [Z96.652]   Admit Date/Time: 4/16/2019  9:43 AM    Primary Diagnosis: S/P total knee arthroplasty, left  Principal Problem: S/P total knee arthroplasty, left    GMLOS: 2.2  Anticipated Discharge Date: 4/19/2019    AM-PAC  Mobility Score: 18    Discharge Planning:  Living Arrangements: house  Concerns To Be Addressed: no discharge needs identified, denies needs/concerns at this time  Anticipated Discharge Disposition: home with outpatient services  Type of Outpatient Services: physical therapy    Barriers to Discharge:  Barriers to Discharge: Other (see comments) (pend void trial)    Participants:  advanced practice provider, , nursing, physical therapy

## 2019-04-19 NOTE — PLAN OF CARE
Problem: Patient Care Overview  Goal: Plan of Care Review  Outcome: Ongoing (interventions implemented as appropriate)   04/19/19 1227   Coping/Psychosocial   Plan Of Care Reviewed With patient   Plan of Care Review   Progress progress toward functional goals as expected   Outcome Summary Pt demonstrated improved car transfer, stair, jannette ambulation performance at [facility]. Pt will beneift from outpatient services to address residual knee ROM and strenth deficits, balance capabilities and to normalize his gait pattern for progression of functional mobility.        Problem: Knee Arthroplasty (Total, Partial) (Adult)  Goal: Signs and Symptoms of Listed Potential Problems Will be Absent, Minimized or Managed (Knee Arthroplasty)  Outcome: Ongoing (interventions implemented as appropriate)   04/19/19 1227   Knee Arthroplasty (Total, Partial)   Problems Assessed (Knee Arthroplasty) decreased range of motion;functional deficit;pain   Problems Present (Knee Arthroplasty) decreased range of motion;functional deficit;pain

## 2019-04-19 NOTE — NURSING NOTE
Pt voided 125mL with PVR of 608mL. Ortho and urology aware. Ortho states to put bacon back in and patient will be discharged. Pt will follow up with urologist outpatient for void trial.

## 2019-04-19 NOTE — PROGRESS NOTES
Hospital Medicine Service -  Daily Progress Note       SUBJECTIVE   Interval History: Left knee pain is improving  Denies any chest pain or shortness of breath     OBJECTIVE      Vital signs in last 24 hours:  Temp:  [36.2 °C (97.1 °F)-37.3 °C (99.1 °F)] 37.1 °C (98.8 °F)  Heart Rate:  [69-90] 84  Resp:  [14-18] 14  BP: (106-137)/(50-69) 127/69    Intake/Output Summary (Last 24 hours) at 04/19/19 1059  Last data filed at 04/19/19 0838   Gross per 24 hour   Intake                0 ml   Output             3025 ml   Net            -3025 ml       PHYSICAL EXAMINATION      Physical Exam   General appearance: alert, appears stated age, cooperative, non-toxic  Head: normocephalic, without obvious abnormality, atraumatic  Eyes: conjunctivae clear. PERRL, EOMI's intact.  Lungs: clear to auscultation bilaterally   Heart: regular rate and rhythm, S1, S2 normal,   Abdomen: Nondistended, +BS, soft, non-tender, no masses palpable   Extremities: Left knee is dressed  Pulses: 2+ and symmetric B/L DP  Neurologic: Alert and oriented X 3, no focal deficits  Skin: intact, no rashes or lesions       LINES, CATHETERS, DRAINS, AIRWAYS, AND WOUNDS   Lines, Drains, Airways, Wounds:  Peripheral IV 04/16/19 Left Hand (Active)   Number of days: 3       Urethral Catheter 16 Fr (Active)   Number of days: 1       Surgical Incision Knee Left (Active)   Number of days: 3       Comments:      LABS / IMAGING / TELE      Labs  Lab Results   Component Value Date    WBC 5.56 04/17/2019    HGB 11.9 (L) 04/17/2019    HCT 35.9 (L) 04/17/2019     (L) 04/17/2019    ALT 30 07/06/2015    AST 24 07/06/2015     04/18/2019    K 5.0 04/18/2019     04/18/2019    CREATININE 1.0 04/18/2019    BUN 18 04/18/2019    CO2 24 04/18/2019    TSH 1.78 07/06/2015    INR 1.6 04/19/2019    HGBA1C 7.2 (H) 04/02/2019       Imaging  I have independently reviewed the pertinent imaging from the last 24 hrs.    ECG/Telemetry  I have independently reviewed the  telemetry. No events for the last 24 hours.    ASSESSMENT AND PLAN      * S/P total knee arthroplasty, left   Assessment & Plan    S/P L total knee, POD# 3  Pain control per orthopedics  DVT ppx per orthopedics  PT/OT  Encourage IS  Bowel regimen  Labs stable     Urinary retention   Assessment & Plan    Required straight cath few times and now bacon placed-voiding trial today  Continue  Laxatives  Out of bed and ambulate  Continue flomax  Continue bowel regimen  Urology consult noted       History of heart valve replacement with bioprosthetic valve   Assessment & Plan    HO Raine Shiley mechanical MV  Records show stable on recent JOSELIN w/ nml valve function. Record indicated EF 50%.  inr goal is 2.5-3.5-as outpatient  Discussed with Ortho they want to keep the INR between 2 and 2.5 to avoid bleeding     HLD (hyperlipidemia)   Assessment & Plan    HOLD Fish oil for now  Continue statin.     Atrial fibrillation (CMS/[facility])   Assessment & Plan    S/P Ablation for AF/VT. Follows with Dr. Teran, CCP  S/P VT ablation Jan 2019.  On Tikosyn, Coumadin and Coreg  INR 1.9 on 4/16 and went upto 2.4 on 4/17 - ortho is concerned about the sudden jump and yesterdays dose held-   INR is 2.2 yesterday and down to 1.6 today  Lytes wnl  ekg from yesterday- qtc is 522- avoid qt prolonging drugs- check rpt ekg from yesterday it is 425  Continue Coreg, Coumadin, and Tikosyn     Essential hypertension   Assessment & Plan    On Coreg, Lasix and Cozaar  HOLD Lasix.  Continue Coreg and Cozaar.  Will monitor BP closely.     Type 2 diabetes mellitus, with long-term current use of insulin (CMS/[facility])   Assessment & Plan    Type 2 DM, on Synjardy, Ozempic, and Levemir  HgbA1C in pre op labs noted to be 7.2  Continue oral agents and Levemir  SSI while in hospital  Monitor accuchecks  NCS diet.  Sugars stable.     SANA on CPAP   Assessment & Plan    Reportedly on CPAP at home at night  Continue CPAP qhs per home settings  Monitor on SANA protocol.           VTE Assessment: Padua    VTE Prophylaxis Plan: coumadin  Code Status: Full Code  Estimated Discharge Date: 4/19/2019  Disposition Planning: dc home today     Osmani Nicholas MD  4/19/2019

## 2019-04-19 NOTE — PROGRESS NOTES
Patient: Roberto Stiles  Location: [facility] 5PAV 5450  MRN: [id number]  Today's date: 4/19/2019    Pt left in recliner chair with LE's elevated in NAD. Call bell within reach and wife visiting. Seat alarm on and nursing notified.    Hospital Course  Sedrick is a 73 y.o. male admitted on 4/16/2019 with Osteoarthritis of left knee, unspecified osteoarthritis type [M17.12]  S/P total knee arthroplasty, left [Z96.652]. Principal problem is S/P total knee arthroplasty, left.    Sedrick has a past medical history of A-fib (CMS/HCC) ([facility]); Arthritis; Bacterial endocarditis; Deep vein thrombosis (CMS/HCC) ([facility]); History of transfusion; Hypertension; Kidney stones; Lipid disorder; Myocardial infarction (CMS/HCC) ([facility]); SCC (squamous cell carcinoma); Sleep apnea; Type 2 diabetes mellitus (CMS/HCC) ([facility]); and Venous insufficiency.          Therapy Pain/Vitals - 04/19/19 1527        Pain/Comfort/Sleep    Pain Charting Type Pain Assessment    Presence of Pain complains of pain/discomfort    Preferred Pain Scale number (Numeric Rating Pain Scale)    Pain Body Location - Side Left    Pain Body Location knee    Pain Rating (0-10): Rest 3    Pain Rating (0-10): Activity 3    Pain Management Interventions position adjusted;cold applied          Prior Living Environment      Most Recent Value   Lives With  spouse   Living Arrangements  house   Living Environment Comment  multistory home, has first floor set up (basement), enter through the back, 2 EZEKIEL to enter, stall shower, 13 EZEKIEL to second floor   Equipment Currently Used at Home  none          Prior Level of Function      Most Recent Value   Ambulation  independent   Transferring  independent   Toileting  independent   Bathing  independent   Dressing  independent   Eating  independent   Communication  understands/communicates without difficulty   Swallowing  swallows foods/liquids without difficulty   Equipment Currently Used at Home  none   Prior Functional Level Comment   independent prior to surgery           04/19/19 1427   Session Details   Document Type daily treatment   Mode of Treatment individual therapy;physical therapy   Patient/Family Observations Sitting up in recliner chair. Dissapointed that he hasn't urinated yet   Time Calculation   Start Time 1427   Stop Time 1511   Time Calculation (min) 44 min   General Information   Patient Profile Reviewed? yes   Onset of Illness/Injury or Date of Surgery 04/16/19   Referring Physician Joshua Shafer   Pertinent History of Current Functional Problem L TKA   Existing Precautions/Restrictions fall;weight bearing   Weight Bearing Status   Left LE Weight Bearing Status weight bearing as tolerated   Orientation Log   Comment A&Ox3   Cognition/Psychosocial   Safety Awareness intact   Attention   Behavioral Observations WNL, no concerns   Safety Awareness/Health Promotion   Safety Awareness/Health Promotion Fall Prevention (Row)   Fall Prevention demonstrates safety awareness related to fall risk   Range of Motion (ROM)   General Range of Motion lower extremity range of motion deficits identified   General LE Assessment   Lower Extremity: Range of Motion knee, left: LE ROM deficit   ROM: Left Knee   AROM Deficit: Extension/Flexion 11-70   PROM Deficit: Extension/Flexion 11-74   Lower Extremity   Lower Extremity: Manual Muscle Testing left knee strength deficit   Transfers   Transfers stand to sit transfer;sit to stand transfer   Maintains Weight Bearing Status (Transfers) able to maintain weight bearing status   Comment Performs STS transfers from recliner chair and standard chair<>RW and vice versa at DS level   Sit to Stand Transfer   Armstrong, Sit to Stand Transfer distant supervision   Assistive Device walker, front-wheeled   Comment Pt demonstrated appropriate hand placement for all transfers   Stand to Sit Transfer   Armstrong, Stand to Sit Transfer distant supervision   Assistive Device walker, front-wheeled   Comment Pt  utilized slow and controlled descent   Gait Analysis/Training   Gait/Stairs Locomotion Gait Training (Group)   Gait Training   Scott, Gait distant supervision   Assistive Device walker, front-wheeled   Distance in Feet 400 feet   Gait Pattern Utilized 2-point   Gait Deviations Identified left;antalgic;decreased brenden;decreased gait speed;decreased heel strike;decreased step length;decreased stride length   Maintains Weight Bearing Status able to maintain weight bearing status   Comment Pt demonstrated improved fluidity of gait responding to verbal cues to lengthen his step/stride. Pt also took less standing breaks during ambulation.    LE Seated Therapeutic Exercise   Exercise(s) Performed LAQ (long arc quad), knee extension;knee flexion;hip flexion;ankle dorsiflexion;ankle plantarflexion  (Ankle pumps, quad and glute sets)   Exercise Type AROM (active range of motion);isometric contraction, static   Expected Outcomes improve functional tolerance, community activity;improve functional tolerance, household activity;improve functional tolerance, self-care activity;improve motor control;improve neuromuscular control;improve functional stability;improve performance, gait skills;improve performance, transfer skills   Restrictions pain and swelling   Sets/Reps Detail 10 reps per ex.   Ability to Transfer Skills able to transfer skills from training to functional activity   Comment Pt exhibited good technique with LAQ's and knee FLX ROM, despite significant ROM restrictions due to swelling. Pt did require minimal cues to avoid activating glutes while performing quad sets.   AM-PAC (TM) - Mobility (Current Function)   Turning from your back to your side while in a flat bed without using bedrails? 3 - A Little   Moving from lying on your back to sitting on the side of a flat bed without using bedrails? 3 - A Little   Moving to and from a bed to a chair? 3 - A Little   Standing up from a chair using your arms? 3 - A  Little   To walk in a hospital room? 3 - A Little   Climbing 3-5 steps with a railing? 3 - A Little   AM-PAC (TM) Mobility Score 18   PT Clinical Impression   Plan For Care Reviewed: Physical Therapy PT plan for care discussed with patient;patient voices agreement with PT plan for care;patient feedback incorporated in PT plan for care   System Pathology/Pathophysiology Noted musculoskeletal   Rehab Potential/Prognosis good, to achieve stated therapy goals   PT Frequency of Treatment 5-7 times per week   Activity Limitations Related to Problem List functional mobility not performed adequately or safely for community activity   Anticipated Equipment Needs at Discharge front wheeled walker   PT Recommended Discharge Disposition home with assist   Daily Outcome Statement Pt exhibits significant ROM deficits due to swelling and reduced quadriceps activation, however, he continues to improve his functional mobility. activity tolerance, and need for UE support. Pt will benefit from further skilled intervention to increase knee ROM FLX and EXT for improved ADL's and ambulatory performance.         Pain Assessment/Intervention  Pain Charting Type: Pain Assessment        Equipment Provided: Walker, with Wheels, Adult   Vendor: [employer]    Education provided this session. See the Patient Education summary report for full details.    PT Care Plan Goals      Most Recent Value   Stair Goal, PT   PT STG: Stairs  modified independence   PT STG: Number of Stairs  4   PT STG Assistive Device: Stairs  cane, straight   PT STG Date Established: Stairs  04/16/19   PT STG Duration: Stairs  7 days or less   PT STG Outcome: Stairs  goal ongoing      PT Care Plan Goals      Most Recent Value   Bed Mobility Goal   Date Goal Established: Bed Mobility  04/16/19   Time to Achieve Goal: Bed Mobility  5 - 7 days   Goal Activity: Bed Mobility  all bed mobility activities   Level of [place] Goal: Bed Mobility   modified independence   Goal Outcome: Bed Mobility  goal ongoing   Gait Goal   Date Goal Established: Gait Training  04/16/19   Time to Achieve Goal: Gait Training  5 - 7 days   Level of [place]  modified independence   Assistive Device: Gait Training  walker, front-wheeled   Distance Goal: Gait Training (feet)  150 feet   Goal Outcome: Gait Training  goal ongoing   Transfer Goal   Date Goal Established: Transfer Training  04/16/19   Time to Achieve Goal: Transfer Training  5 - 7 days   Goal Activity: Transfer Training  sit to stand/stand to sit   Level of [place] Goal: Transfer Training  modified independence   Assistive Device: Transfer Training  walker, front-wheeled   Goal Outcome: Transfer Training  goal ongoing   Physical Therapy Goal   Date Goal Established: Physical Therapy  04/16/19   Time to Achieve Goal: Physical Therapy  5 - 7 days   Goal Activity: Physical Therapy  Pt will perform a car transfer   Level of [place] Goal: Physical Therapy  modified independence   Goal Outcome: Physical Therapy  goal ongoing        Corinne Palombo SPTA

## 2019-04-19 NOTE — PROGRESS NOTES
Orthopaedic Surgery Progress Note:     NAVYA   Pain controlled    Vitals:    04/18/19 2256   BP:    Pulse: 85   Resp:    Temp:    SpO2:        Dressing clean/dry/intact  Motor intact to EHL/PF/DF  SILT all distributions  Palpable DP    A/P s/p L TKA  -PT/OT  -Pain control  -DVT PPX  -Follow up urology recs regarding void trial

## 2019-04-20 ENCOUNTER — HOSPITAL ENCOUNTER (EMERGENCY)
Facility: HOSPITAL | Age: 74
Discharge: HOME | End: 2019-04-20
Attending: EMERGENCY MEDICINE | Admitting: EMERGENCY MEDICINE
Payer: MEDICARE

## 2019-04-20 VITALS
WEIGHT: 240 LBS | HEART RATE: 94 BPM | OXYGEN SATURATION: 95 % | SYSTOLIC BLOOD PRESSURE: 125 MMHG | TEMPERATURE: 99.1 F | RESPIRATION RATE: 16 BRPM | DIASTOLIC BLOOD PRESSURE: 60 MMHG | HEIGHT: 72 IN | BODY MASS INDEX: 32.51 KG/M2

## 2019-04-20 DIAGNOSIS — R79.1 SUBTHERAPEUTIC INTERNATIONAL NORMALIZED RATIO (INR): Primary | ICD-10-CM

## 2019-04-20 LAB
INR PPP: 1.6 INR
PROTHROMBIN TIME: 17.9 SEC (ref 12.2–14.5)

## 2019-04-20 PROCEDURE — 96372 THER/PROPH/DIAG INJ SC/IM: CPT

## 2019-04-20 PROCEDURE — 3E023GC INTRODUCTION OF OTHER THERAPEUTIC SUBSTANCE INTO MUSCLE, PERCUTANEOUS APPROACH: ICD-10-PCS | Performed by: EMERGENCY MEDICINE

## 2019-04-20 PROCEDURE — 63600000 HC DRUGS/DETAIL CODE: Mod: JW | Performed by: NURSE PRACTITIONER

## 2019-04-20 PROCEDURE — 36415 COLL VENOUS BLD VENIPUNCTURE: CPT | Performed by: NURSE PRACTITIONER

## 2019-04-20 PROCEDURE — 85610 PROTHROMBIN TIME: CPT | Performed by: NURSE PRACTITIONER

## 2019-04-20 PROCEDURE — 99283 EMERGENCY DEPT VISIT LOW MDM: CPT | Mod: 25

## 2019-04-20 RX ORDER — ENOXAPARIN SODIUM 100 MG/ML
110 INJECTION SUBCUTANEOUS ONCE
Status: COMPLETED | OUTPATIENT
Start: 2019-04-20 | End: 2019-04-20

## 2019-04-20 RX ORDER — ENOXAPARIN SODIUM 150 MG/ML
110 INJECTION SUBCUTANEOUS
Qty: 2.92 ML | Refills: 0 | Status: SHIPPED | OUTPATIENT
Start: 2019-04-20 | End: 2019-04-22

## 2019-04-20 RX ADMIN — ENOXAPARIN SODIUM 110 MG: 100 INJECTION SUBCUTANEOUS at 23:31

## 2019-04-21 ASSESSMENT — ENCOUNTER SYMPTOMS
SHORTNESS OF BREATH: 0
SORE THROAT: 0
CONFUSION: 0
NECK PAIN: 0
NAUSEA: 0
LIGHT-HEADEDNESS: 1
WEAKNESS: 0
FEVER: 0
NUMBNESS: 0
HEADACHES: 0
COUGH: 0
EYE PAIN: 0
SPEECH DIFFICULTY: 0
BACK PAIN: 0
PHOTOPHOBIA: 0
DIFFICULTY URINATING: 0
CONSTIPATION: 0
ABDOMINAL PAIN: 0
FATIGUE: 0
VOMITING: 0
DIARRHEA: 0
COLOR CHANGE: 0

## 2019-04-21 NOTE — ED PROVIDER NOTES
HPI   Patient states history of mitral valve replacement and is on Coumadin. Goal INR 2.5-3.5.  Patient is followed by Dr. Teran  States on April 11 was stopped on his Coumadin and was placed on Lovenox patient states he had his knee replaced on Tuesday.  Patient was restarted on his Coumadin yesterday and stopped his Lovenox today.  Patient states he is checked his INR at home at 8 PM today and his INR is 1.6.  Patient spoke with cardiology instructed patient to get an additional shot of Lovenox.  Patient states he cannot find a 24-hour pharmacy so presented here.  Patient's only complaint is dizziness.  No falls.  Patient states dizziness is intermittent and is described as lightheaded.  Patient is experiencing now.  Patient denies fevers, changes to hearing/vision/speech, numbness, weakness, chest pain, shortness of breath or any other symptoms.    Past Medical History:   Diagnosis Date   • A-fib (CMS/HCC) (HCC)    • Arthritis     OA   • Bacterial endocarditis    • Deep vein thrombosis (CMS/HCC) (HCC)     LLE over 30 years ago   • History of transfusion     for MV replacement x34 years ago   • Hypertension    • Kidney stones    • Lipid disorder    • Myocardial infarction (CMS/HCC) (HCC)    • SCC (squamous cell carcinoma)     right ear   • Sleep apnea     wears CPAP   • Type 2 diabetes mellitus (CMS/HCC) (HCC)     last A1C 11/2018   • Venous insufficiency        Past Surgical History:   Procedure Laterality Date   • CARDIAC ELECTROPHYSIOLOGY MAPPING AND ABLATION     • CARDIAC SURGERY     • KIDNEY STONE SURGERY     • KNEE SURGERY     • MITRAL VALVE REPLACEMENT     • TONSILLECTOMY         No Known Allergies    History   Smoking Status   • Never Smoker   Smokeless Tobacco   • Never Used       History   Alcohol Use   • 1.8 oz/week   • 3 Shots of liquor per week       History   Drug Use No       No LMP for male patient.    Review of Systems   Constitutional: Negative for fatigue and fever.   HENT: Negative for  congestion, ear pain, hearing loss and sore throat.    Eyes: Negative for photophobia, pain and visual disturbance.   Respiratory: Negative for cough and shortness of breath.    Cardiovascular: Negative for chest pain.   Gastrointestinal: Negative for abdominal pain, constipation, diarrhea, nausea and vomiting.   Genitourinary: Negative for difficulty urinating.   Musculoskeletal: Negative for back pain and neck pain.   Skin: Negative for color change and rash.   Neurological: Positive for light-headedness. Negative for speech difficulty, weakness, numbness and headaches.   Psychiatric/Behavioral: Negative for confusion.   All other systems reviewed and are negative.      Vitals:    04/20/19 2151 04/20/19 2344   BP: 128/60 125/60   BP Location: Left upper arm    Patient Position: Sitting    Pulse: 88 94   Resp: 18 16   Temp: 37.3 °C (99.1 °F)    TempSrc: Temporal    SpO2: 95% 95%   Weight: 109 kg (240 lb)    Height: 1.829 m (6')        Physical Exam   Constitutional: He is oriented to person, place, and time. He appears well-developed and well-nourished. No distress.   Well appearing   HENT:   Head: Normocephalic and atraumatic.   Mouth/Throat: Mucous membranes are normal.   Airway intact   Eyes: Pupils are equal, round, and reactive to light.   Cardiovascular: Normal rate and regular rhythm.    Murmur heard.  Pulmonary/Chest: Effort normal and breath sounds normal. No respiratory distress.   Abdominal: Soft. He exhibits no mass. There is no tenderness. There is no rigidity and no guarding.   Musculoskeletal: Normal range of motion. He exhibits no edema.   Neurological: He is alert and oriented to person, place, and time.   Skin: Skin is warm and dry.   Psychiatric: He has a normal mood and affect. His speech is normal and behavior is normal.   Nursing note and vitals reviewed.      Procedures    ED Course as of Apr 21 1413   Sat Apr 20, 2019   2214 Impression-subtherapeutic INR, lightheaded    Patient offered  workup and evaluation for lightheadedness including labs, ekg, cxr. Patient indicated on education/risks/benefits. Verbalized understanding and questions answered. States he would only like an INR rechecked. Capable and competent to make this decision and wife in agreement    Plan-inr  [BRYANNA]   2241 Will speak with cards INR: 1.6 [BRYANNA]   2301 Spoke with Dr Rea, states give patient does here of 110mg Lovenox. States to give him 4 doses to go home with (is on BID) and have him call tomorrow. Patient is still to be on coumadin as well.    To d/c with above plan    Patient discussed with Dr infante, agreeable to workup/plan    To patient's bedside. Patient exam unchanged. Patient sitting quietly and comfortably in the stretcher.  Discussed results and plan with patient.  Patient verbalized understanding and agreeable without any questions or concerns.      Extensive education provided on signs and symptoms that would warrant a return visit to the emergency department along with emphasis on importance of follow-up.  Patient verbalized understanding and agreeable.  All questions answered        [BRYANNA]      ED Course User Index  [BRYANNA] Mary Bowser CRNP         Clinical Impressions as of Apr 21 1413   Subtherapeutic international normalized ratio (INR)       Final diagnoses:   [R79.1] Subtherapeutic international normalized ratio (INR)       Labs Reviewed   PROTIME-INR - Abnormal        Result Value    PT 17.9 (*)     INR 1.6         No orders to display            Mary Bowser CRNP  04/21/19 1413

## 2019-04-21 NOTE — DISCHARGE INSTRUCTIONS
As discussed please call Dr Rea tomorrow morning. Please continue your coumadin as prescribed. Please return for any new, worsening or concerning symptoms such as new/worsening pain, fevers, dizziness, confusion, bleeding, etc    We offered to perform additional labs, ekg and imaging to evaluate your lightheadedness however you declined, you may return at any time if you change your mind    No driving if you are feeling lightheaded

## 2019-04-21 NOTE — DISCHARGE SUMMARY
Inpatient Discharge Summary    BRIEF OVERVIEW  Admitting Provider:      Attending Provider: No att. providers found Attending phys phone: N/A  Primary Care Physician at Discharge: Willa Ambrocio [phone]    Admission Date: 4/16/2019     Discharge Date: 4/19/19  Primary Discharge Diagnosis  S/P total knee arthroplasty, left    Secondary Discharge Diagnosis  Active Hospital Problems    Diagnosis Date Noted   • Urinary retention 04/17/2019   • S/P total knee arthroplasty, left 04/16/2019   • History of heart valve replacement with bioprosthetic valve 04/16/2019   • Atrial fibrillation (CMS/[facility]) 04/02/2019   • Essential hypertension 04/02/2019   • HLD (hyperlipidemia) 04/02/2019   • SANA on CPAP 04/02/2019   • Type 2 diabetes mellitus, with long-term current use of insulin (CMS/[facility]) 04/02/2019      Resolved Hospital Problems    Diagnosis Date Noted Date Resolved   No resolved problems to display.       DETAILS OF HOSPITAL STAY    Operative Procedures Performed  Procedure(s):  KNEE ARTHROPLASTY TOTAL - Study Patient - Anesthesiologist performed    Consults:   Consult Notes 3/22/2019 to 4/21/2019     Date of Service Author Author Type Status Note Type File Time    04/18/19 1340 Robert Hernandez MD Resident Attested Consults 04/19/19 0812    04/16/19 1344 Vivi Hastings PA C Physician Assistant Attested Consults 04/16/19 1412    04/02/19 0930 Denae Duke DO Physician Signed Consults 04/02/19 1412          Consult Orders During Admission:  IP CONSULT TO HOSPITALIST  IP CONSULT TO UROLOGY       Imaging  X-ray Knee Left 1 Or 2 Views    Result Date: 4/16/2019  IMPRESSION: Left knee arthroplasty. Post-operative changes. I certify that I have reviewed this examination and agree with this report. Ervin Hoffman MD      Presenting Problem/History of Present Illness  S/P total knee arthroplasty, left     Exam on Day of Discharge  Patient seen and examined on day of discharge.      Dressing  clean/dry/intact  Motor intact to EHL/PF/DF  SILT all distributions  Palpable DP    Hospital Course  The patient underwent SC TOTAL KNEE ARTHROPLASTY [59050] (KNEE ARTHROPLASTY TOTAL - Study Patient - Anesthesiologist performed) on 4/16/2019 and tolerated the procedure well. Details of the procedure can be found in the operative summary. The patient's postoperative course was unremarkable although the patient was unable to void. The patient was seen postoperatively by Pt, Ot, and social work and progressed to the point that on the day of discharge was tolerating a house diet, and ambulating without assistance. At the time of discharge, the patient's vital signs were stable with a clean, dry, and intact incision. The patient was discharged on 4/19/2019 and told to continue DVT prophylaxis (warfarin), continue pain medications, resume preoperative medications and to follow up with their surgeon. The patient was advised to call the office with any problems including drainage from the incision, redness around the incision, worsening pain and fever greater than 101 degrees F.       Discharge Orders  PENDING ORDERS (720h ago through future)        Ordered     Full Code  Continuous,   Discontinued:  04/17/19 0003,   Status:  Canceled      Signed and Held     NPO Diet  Diet effective now,   Discontinued:  04/17/19 0003,   Status:  Canceled      Signed and Held     Vital Signs per routine  Per unit protocol,   Discontinued:  04/17/19 0003,   Status:  Canceled      Signed and Held     Clip and prep for procedure in pre-op (specify location)  Until discontinued,   Discontinued:  04/17/19 0003,   Status:  Canceled      Signed and Held     Place sequential compression device: (Please specify)  Once,   Discontinued:  04/17/19 0003,   Status:  Canceled      Signed and Held     Chlorhexadine wipe in pre op area  Until discontinued,   Discontinued:  04/17/19 0003,   Status:  Canceled      Signed and Held     sodium chloride 0.9 %  infusion  Continuous,   Discontinued:  --,   Status:  Canceled      Signed and Held     glucose chewable tablet 16-32 g of dextrose  (Hypoglycemia Treatment Protocol and Hyperglycemia Validation Protocol)  As needed,   Discontinued:  --,   Status:  Canceled      Signed and Held     dextrose 40 % oral gel 15-30 g of dextrose  (Hypoglycemia Treatment Protocol and Hyperglycemia Validation Protocol)  As needed,   Discontinued:  --,   Status:  Canceled      Signed and Held     glucagon (GLUCAGEN) injection 1 mg  (Hypoglycemia Treatment Protocol and Hyperglycemia Validation Protocol)  As needed,   Discontinued:  --,   Status:  Canceled      Signed and Held     dextrose in water injection 12.5 g  (Hypoglycemia Treatment Protocol and Hyperglycemia Validation Protocol)  As needed,   Discontinued:  --,   Status:  Canceled      Signed and Held     fentaNYL (PF) (SUBLIMAZE) injection 50 mcg  Every 15 min PRN,   Discontinued:  --,   Status:  Canceled      Signed and Held     HYDROmorphone (DILAUDID) injection 0.5 mg  (morphine or hydromorphone IV, to be used after fentanyl doses are complete)  Every 15 min PRN,   Discontinued:  --,   Status:  Canceled      Signed and Held     ondansetron (ZOFRAN) injection 4 mg  Every 15 min PRN,   Discontinued:  --,   Status:  Canceled      Signed and Held     labetalol (NORMODYNE,TRANDATE) injection 5 mg  As needed,   Discontinued:  --,   Status:  Canceled      Signed and Held         Medication List      START taking these medications    oxyCODONE 5 mg immediate release tablet  Commonly known as:  ROXICODONE  Take 1-2 tablets (5-10 mg total) by mouth every 4 (four) hours as needed for moderate pain (Pain).  Dose:  5-10 mg     sennosides-docusate sodium 8.6-50 mg  Commonly known as:  SENNA WITH DOCUSATE SODIUM  Take 1 tablet by mouth 2 (two) times a day. To prevent constipation associated with pain medication, hold for loose stool  Dose:  1 tablet     tamsulosin 0.4 mg capsule  Commonly known  as:  FLOMAX  Take 1 capsule (0.4 mg total) by mouth daily.  Dose:  0.4 mg        CHANGE how you take these medications    FISH OIL-omega-3 fatty acids 340-1,000 mg capsule  Commonly known as:  FISH OIL  Take 1 capsule by mouth 2 (two) times a day. Hold for 2 weeks - may increase bleeding risk  Dose:  1 capsule  What changed:  additional instructions        CONTINUE taking these medications    aspirin 81 mg enteric coated tablet  Take 81 mg by mouth daily.  Dose:  81 mg     carvedilol 12.5 mg tablet  Commonly known as:  COREG  Take 12.5 mg by mouth 2 (two) times a day with meals.  Dose:  12.5 mg     dofetilide 250 mcg capsule  Commonly known as:  TIKOSYN  Take 250 mcg by mouth 2 (two) times a day.  Dose:  250 mcg     furosemide 20 mg tablet  Commonly known as:  LASIX  Take 20 mg by mouth daily. As needed for leg swelling  Dose:  20 mg     insulin detemir U-100 100 unit/mL (3 mL) subcutaneous pen  Commonly known as:  LEVEMIR  Inject 45 Units under the skin nightly.  Dose:  45 Units     losartan 100 mg tablet  Commonly known as:  COZAAR  Take 100 mg by mouth every evening.  Dose:  100 mg     multivitamin tablet  Take by mouth daily.     NOT IN DATABASE  Prevagen 10mg daily     OZEMPIC 1 mg/dose (2 mg/1.5 mL) pen injector  Inject 1 mg/mL under the skin once a week. Patient takes on SUNday  Dose:  1 mg/mL  Generic drug:  semaglutide     potassium chloride 20 mEq CR tablet  Commonly known as:  KLOR-CON  Take 20 mEq by mouth daily.  Dose:  20 mEq     rosuvastatin 20 mg tablet  Commonly known as:  CRESTOR  Take 20 mg by mouth every evening.  Dose:  20 mg     SYNJARDY 12.5-500 mg tablet  Take 1 tablet by mouth 2 (two) times a day.  Dose:  1 tablet  Generic drug:  empagliflozin-metformin     VITAMIN D3 1,000 unit capsule  Take 1,000 Units by mouth daily.  Dose:  1000 Units  Generic drug:  cholecalciferol (vitamin D3)     * warfarin 2.5 mg tablet  Commonly known as:  COUMADIN  Take 2.5 mg by mouth once a week. Friday  only  Dose:  2.5 mg     * warfarin 5 mg tablet  Commonly known as:  COUMADIN  Take 5 mg by mouth every evening. Mon, Tues, Wed, Thurs Sat and Sun  Dose:  5 mg        * This list has 2 medication(s) that are the same as other medications prescribed for you. Read the directions carefully, and ask your doctor or other care provider to review them with you.              Instructions for after discharge     Follow-up:       Instructions for follow-up:  Please follow-up with your cardiologist Dr. Teran regarding INR/coumadin dosing.    Walker - Type: Adult; Wheels: Standard; Wide: No       Type:  Adult    Wheels:  Standard    Wide:  No    The face to face evaluation was performed on:  4/19/2019            Outpatient Follow-Ups  Encounter Information     You do not currently have any appointments scheduled.        Referrals:  No orders of the defined types were placed in this encounter.      Active Issues Requiring Follow-up  Issue: inability to void  What is Needed: follow up with urology      Test Results Pending at Discharge  Unresulted Labs     None            Discharge Disposition  Home   Code Status at Discharge: Prior  Physician Order for Life-Sustaining Treatment Document Status      No documents found

## 2019-04-21 NOTE — ED ATTESTATION NOTE
Procedures  Physical Exam  Review of Systems    4/20/201911:13 PM  I have personally seen and examined the patient.  I reviewed and agree with the PA/NP/Resident's assessment and plan of care.    My examination, assessment, and plan of care of Roberto Stiles is as follows:    The patient presents with therapeutic Coumadin after a knee replacement.  Was unable to find a pharmacy that would give him the Lovenox.    Exam: Currently no distress.  The left leg is more swollen than the right but the patient says this is actually smaller than it has been before.  There was an area of soreness which is now gone.    Impression/Plan: Subtherapeutic Coumadin nurse practitioner [place] is spoken with Dr. Rea who recommends giving him 4 doses of Lovenox the first 1 now every 12 hours and continuing on the Coumadin.  Agree with NP management.        Kumar Isaac MD  04/20/19 8906

## 2021-04-14 DIAGNOSIS — Z23 ENCOUNTER FOR IMMUNIZATION: ICD-10-CM

## 2021-06-05 ENCOUNTER — APPOINTMENT (EMERGENCY)
Dept: RADIOLOGY | Facility: HOSPITAL | Age: 76
DRG: 065 | End: 2021-06-05
Attending: EMERGENCY MEDICINE
Payer: MEDICARE

## 2021-06-05 ENCOUNTER — HOSPITAL ENCOUNTER (INPATIENT)
Facility: HOSPITAL | Age: 76
LOS: 2 days | Discharge: HOME | DRG: 065 | End: 2021-06-07
Attending: EMERGENCY MEDICINE | Admitting: INTERNAL MEDICINE
Payer: MEDICARE

## 2021-06-05 DIAGNOSIS — R27.0 ATAXIA: ICD-10-CM

## 2021-06-05 DIAGNOSIS — Z95.3 HISTORY OF HEART VALVE REPLACEMENT WITH BIOPROSTHETIC VALVE: Primary | ICD-10-CM

## 2021-06-05 DIAGNOSIS — R20.2 FACIAL PARESTHESIA: ICD-10-CM

## 2021-06-05 DIAGNOSIS — I63.439 CEREBROVASCULAR ACCIDENT (CVA) DUE TO EMBOLISM OF POSTERIOR CEREBRAL ARTERY, UNSPECIFIED BLOOD VESSEL LATERALITY (CMS/HCC): ICD-10-CM

## 2021-06-05 DIAGNOSIS — R42 DIZZINESS: ICD-10-CM

## 2021-06-05 DIAGNOSIS — I63.9 CEREBROVASCULAR ACCIDENT (CVA), UNSPECIFIED MECHANISM (CMS/HCC): ICD-10-CM

## 2021-06-05 LAB
ALBUMIN SERPL-MCNC: 4.8 G/DL (ref 3.4–5)
ALP SERPL-CCNC: 74 IU/L (ref 35–126)
ALT SERPL-CCNC: 33 IU/L (ref 16–63)
ANION GAP SERPL CALC-SCNC: 11 MEQ/L (ref 3–15)
APTT PPP: 34 SEC (ref 23–35)
AST SERPL-CCNC: 33 IU/L (ref 15–41)
BASOPHILS # BLD: 0.04 K/UL (ref 0.01–0.1)
BASOPHILS NFR BLD: 0.7 %
BILIRUB SERPL-MCNC: 1.3 MG/DL (ref 0.3–1.2)
BUN SERPL-MCNC: 32 MG/DL (ref 8–20)
CALCIUM SERPL-MCNC: 9.6 MG/DL (ref 8.9–10.3)
CHLORIDE SERPL-SCNC: 106 MEQ/L (ref 98–109)
CO2 SERPL-SCNC: 21 MEQ/L (ref 22–32)
CREAT SERPL-MCNC: 1.2 MG/DL (ref 0.8–1.3)
DIFFERENTIAL METHOD BLD: ABNORMAL
EOSINOPHIL # BLD: 0.18 K/UL (ref 0.04–0.54)
EOSINOPHIL NFR BLD: 3 %
ERYTHROCYTE [DISTWIDTH] IN BLOOD BY AUTOMATED COUNT: 12.7 % (ref 11.6–14.4)
GFR SERPL CREATININE-BSD FRML MDRD: 59.3 ML/MIN/1.73M*2
GLUCOSE SERPL-MCNC: 234 MG/DL (ref 70–99)
HCT VFR BLDCO AUTO: 45.3 % (ref 40.1–51)
HGB BLD-MCNC: 15.4 G/DL (ref 13.7–17.5)
IMM GRANULOCYTES # BLD AUTO: 0.05 K/UL (ref 0–0.08)
IMM GRANULOCYTES NFR BLD AUTO: 0.8 %
INR PPP: 2.1
LYMPHOCYTES # BLD: 1.08 K/UL (ref 1.2–3.5)
LYMPHOCYTES NFR BLD: 17.8 %
MCH RBC QN AUTO: 32.2 PG (ref 28–33.2)
MCHC RBC AUTO-ENTMCNC: 34 G/DL (ref 32.2–36.5)
MCV RBC AUTO: 94.8 FL (ref 83–98)
MONOCYTES # BLD: 0.51 K/UL (ref 0.3–1)
MONOCYTES NFR BLD: 8.4 %
NEUTROPHILS # BLD: 4.2 K/UL (ref 1.7–7)
NEUTS SEG NFR BLD: 69.3 %
NRBC BLD-RTO: 0 %
PDW BLD AUTO: 10 FL (ref 9.4–12.4)
PLATELET # BLD AUTO: 145 K/UL (ref 150–350)
POTASSIUM SERPL-SCNC: 4.9 MEQ/L (ref 3.6–5.1)
PROT SERPL-MCNC: 7.2 G/DL (ref 6–8.2)
PROTHROMBIN TIME: 22.3 SEC (ref 12.2–14.5)
RBC # BLD AUTO: 4.78 M/UL (ref 4.5–5.8)
SARS-COV-2 RNA RESP QL NAA+PROBE: NEGATIVE
SODIUM SERPL-SCNC: 138 MEQ/L (ref 136–144)
TROPONIN I SERPL-MCNC: 0.02 NG/ML
WBC # BLD AUTO: 6.06 K/UL (ref 3.8–10.5)

## 2021-06-05 PROCEDURE — 93005 ELECTROCARDIOGRAM TRACING: CPT

## 2021-06-05 PROCEDURE — U0002 COVID-19 LAB TEST NON-CDC: HCPCS | Performed by: NURSE PRACTITIONER

## 2021-06-05 PROCEDURE — 85730 THROMBOPLASTIN TIME PARTIAL: CPT | Performed by: EMERGENCY MEDICINE

## 2021-06-05 PROCEDURE — 36415 COLL VENOUS BLD VENIPUNCTURE: CPT

## 2021-06-05 PROCEDURE — 85025 COMPLETE CBC W/AUTO DIFF WBC: CPT | Performed by: EMERGENCY MEDICINE

## 2021-06-05 PROCEDURE — 63600000 HC DRUGS/DETAIL CODE: Performed by: NURSE PRACTITIONER

## 2021-06-05 PROCEDURE — 25800000 HC PHARMACY IV SOLUTIONS: Performed by: NURSE PRACTITIONER

## 2021-06-05 PROCEDURE — 85610 PROTHROMBIN TIME: CPT | Performed by: EMERGENCY MEDICINE

## 2021-06-05 PROCEDURE — 93005 ELECTROCARDIOGRAM TRACING: CPT | Performed by: EMERGENCY MEDICINE

## 2021-06-05 PROCEDURE — 84484 ASSAY OF TROPONIN QUANT: CPT | Performed by: EMERGENCY MEDICINE

## 2021-06-05 PROCEDURE — 71045 X-RAY EXAM CHEST 1 VIEW: CPT

## 2021-06-05 PROCEDURE — 99223 1ST HOSP IP/OBS HIGH 75: CPT | Performed by: INTERNAL MEDICINE

## 2021-06-05 PROCEDURE — 96360 HYDRATION IV INFUSION INIT: CPT

## 2021-06-05 PROCEDURE — 85025 COMPLETE CBC W/AUTO DIFF WBC: CPT

## 2021-06-05 PROCEDURE — 96361 HYDRATE IV INFUSION ADD-ON: CPT

## 2021-06-05 PROCEDURE — 20600000 HC ROOM AND CARE INTERMEDIATE/TELEMETRY

## 2021-06-05 PROCEDURE — 80053 COMPREHEN METABOLIC PANEL: CPT | Performed by: EMERGENCY MEDICINE

## 2021-06-05 PROCEDURE — 99285 EMERGENCY DEPT VISIT HI MDM: CPT | Mod: 25

## 2021-06-05 PROCEDURE — 70450 CT HEAD/BRAIN W/O DYE: CPT | Mod: ME

## 2021-06-05 PROCEDURE — 63700000 HC SELF-ADMINISTRABLE DRUG: Performed by: NURSE PRACTITIONER

## 2021-06-05 RX ORDER — POTASSIUM CHLORIDE 14.9 MG/ML
20 INJECTION INTRAVENOUS AS NEEDED
Status: DISCONTINUED | OUTPATIENT
Start: 2021-06-05 | End: 2021-06-07 | Stop reason: HOSPADM

## 2021-06-05 RX ORDER — WARFARIN SODIUM 5 MG/1
5 TABLET ORAL
Status: DISCONTINUED | OUTPATIENT
Start: 2021-06-07 | End: 2021-06-06

## 2021-06-05 RX ORDER — ASPIRIN 81 MG/1
81 TABLET ORAL DAILY
Status: DISCONTINUED | OUTPATIENT
Start: 2021-06-06 | End: 2021-06-07 | Stop reason: HOSPADM

## 2021-06-05 RX ORDER — POTASSIUM CHLORIDE 750 MG/1
40 TABLET, FILM COATED, EXTENDED RELEASE ORAL AS NEEDED
Status: DISCONTINUED | OUTPATIENT
Start: 2021-06-05 | End: 2021-06-07 | Stop reason: HOSPADM

## 2021-06-05 RX ORDER — SENNOSIDES 8.6 MG/1
1 TABLET ORAL 2 TIMES DAILY PRN
Status: DISCONTINUED | OUTPATIENT
Start: 2021-06-05 | End: 2021-06-07 | Stop reason: HOSPADM

## 2021-06-05 RX ORDER — ROSUVASTATIN CALCIUM 20 MG/1
20 TABLET, COATED ORAL EVERY EVENING
Status: DISCONTINUED | OUTPATIENT
Start: 2021-06-05 | End: 2021-06-07 | Stop reason: HOSPADM

## 2021-06-05 RX ORDER — DEXTROSE 50 % IN WATER (D50W) INTRAVENOUS SYRINGE
25 AS NEEDED
Status: DISCONTINUED | OUTPATIENT
Start: 2021-06-05 | End: 2021-06-07 | Stop reason: HOSPADM

## 2021-06-05 RX ORDER — ACETAMINOPHEN 325 MG/1
650 TABLET ORAL EVERY 4 HOURS PRN
Status: DISCONTINUED | OUTPATIENT
Start: 2021-06-05 | End: 2021-06-07 | Stop reason: HOSPADM

## 2021-06-05 RX ORDER — DEXTROSE 40 %
15-30 GEL (GRAM) ORAL AS NEEDED
Status: DISCONTINUED | OUTPATIENT
Start: 2021-06-05 | End: 2021-06-07 | Stop reason: HOSPADM

## 2021-06-05 RX ORDER — INSULIN ASPART 100 [IU]/ML
6-10 INJECTION, SOLUTION INTRAVENOUS; SUBCUTANEOUS
Status: DISCONTINUED | OUTPATIENT
Start: 2021-06-06 | End: 2021-06-07 | Stop reason: HOSPADM

## 2021-06-05 RX ORDER — ASPIRIN 325 MG
325 TABLET ORAL ONCE
Status: COMPLETED | OUTPATIENT
Start: 2021-06-05 | End: 2021-06-05

## 2021-06-05 RX ORDER — HEPARIN SODIUM 10000 [USP'U]/100ML
0-4000 INJECTION, SOLUTION INTRAVENOUS
Status: DISCONTINUED | OUTPATIENT
Start: 2021-06-05 | End: 2021-06-06

## 2021-06-05 RX ORDER — TAMSULOSIN HYDROCHLORIDE 0.4 MG/1
0.4 CAPSULE ORAL DAILY
Status: DISCONTINUED | OUTPATIENT
Start: 2021-06-06 | End: 2021-06-07 | Stop reason: HOSPADM

## 2021-06-05 RX ORDER — BISACODYL 10 MG/1
10 SUPPOSITORY RECTAL DAILY PRN
Status: DISCONTINUED | OUTPATIENT
Start: 2021-06-05 | End: 2021-06-07 | Stop reason: HOSPADM

## 2021-06-05 RX ORDER — IBUPROFEN 200 MG
16-32 TABLET ORAL AS NEEDED
Status: DISCONTINUED | OUTPATIENT
Start: 2021-06-05 | End: 2021-06-07 | Stop reason: HOSPADM

## 2021-06-05 RX ORDER — CHOLECALCIFEROL (VITAMIN D3) 25 MCG
1000 TABLET ORAL DAILY
Status: DISCONTINUED | OUTPATIENT
Start: 2021-06-06 | End: 2021-06-07 | Stop reason: HOSPADM

## 2021-06-05 RX ORDER — ALUMINUM HYDROXIDE, MAGNESIUM HYDROXIDE, AND SIMETHICONE 1200; 120; 1200 MG/30ML; MG/30ML; MG/30ML
30 SUSPENSION ORAL EVERY 4 HOURS PRN
Status: DISCONTINUED | OUTPATIENT
Start: 2021-06-05 | End: 2021-06-07 | Stop reason: HOSPADM

## 2021-06-05 RX ORDER — DOFETILIDE 0.25 MG/1
250 CAPSULE ORAL 2 TIMES DAILY
Status: DISCONTINUED | OUTPATIENT
Start: 2021-06-05 | End: 2021-06-07 | Stop reason: HOSPADM

## 2021-06-05 RX ORDER — WARFARIN 2.5 MG/1
2.5 TABLET ORAL 3 TIMES WEEKLY
Status: DISCONTINUED | OUTPATIENT
Start: 2021-06-06 | End: 2021-06-06

## 2021-06-05 RX ORDER — POTASSIUM CHLORIDE 750 MG/1
20 TABLET, FILM COATED, EXTENDED RELEASE ORAL AS NEEDED
Status: DISCONTINUED | OUTPATIENT
Start: 2021-06-05 | End: 2021-06-07 | Stop reason: HOSPADM

## 2021-06-05 RX ADMIN — HEPARIN SODIUM 1350 UNITS/HR: 10000 INJECTION, SOLUTION INTRAVENOUS at 20:53

## 2021-06-05 RX ADMIN — ASPIRIN 325 MG ORAL TABLET 325 MG: 325 PILL ORAL at 17:02

## 2021-06-05 RX ADMIN — SODIUM CHLORIDE 1000 ML: 9 INJECTION, SOLUTION INTRAVENOUS at 15:23

## 2021-06-05 ASSESSMENT — COGNITIVE AND FUNCTIONAL STATUS - GENERAL
TOILETING: 4 - NONE
HELP NEEDED FOR BATHING: 4 - NONE
EATING MEALS: 4 - NONE
DRESSING REGULAR LOWER BODY CLOTHING: 4 - NONE
HELP NEEDED FOR PERSONAL GROOMING: 4 - NONE
WALKING IN HOSPITAL ROOM: 4 - NONE
STANDING UP FROM CHAIR USING ARMS: 4 - NONE
DRESSING REGULAR UPPER BODY CLOTHING: 4 - NONE
MOVING TO AND FROM BED TO CHAIR: 4 - NONE
CLIMB 3 TO 5 STEPS WITH RAILING: 4 - NONE

## 2021-06-05 ASSESSMENT — ENCOUNTER SYMPTOMS
DYSURIA: 0
FACIAL ASYMMETRY: 0
NUMBNESS: 1
SHORTNESS OF BREATH: 0
NAUSEA: 0
ABDOMINAL PAIN: 0
HEADACHES: 0
FEVER: 0
WEAKNESS: 0
BRUISES/BLEEDS EASILY: 1
VOMITING: 0
ARTHRALGIAS: 0
DIZZINESS: 1

## 2021-06-05 ASSESSMENT — PATIENT HEALTH QUESTIONNAIRE - PHQ9: SUM OF ALL RESPONSES TO PHQ9 QUESTIONS 1 & 2: 0

## 2021-06-05 NOTE — ED PROVIDER NOTES
Emergency Medicine Note  HPI   HISTORY OF PRESENT ILLNESS     75-year-old male with past medical history of A. fib and mitral valve replacement (anticoagulated on Coumadin), hypertension, hyperlipidemia, type 2 diabetes and SANA presents to the emergency department with reported lightheaded dizziness today around 10:30 AM.  Patient states that he went to the golfing driving range, he got out of his car when he had sudden onset lightheaded dizziness.  He states that he walked around to the trunk of his car when he developed right-sided facial numbness.  He states he got back into his car to sit until symptoms resolved.  Denies associated visual disturbances, chest pain, palpitations, shortness of breath, abdominal pain, nausea, vomiting, speech difficulty or extremity weakness.  He states symptoms lasted for a total of 3 minutes.  Once symptoms resolved he went back to the driving range and was able to hit golf balls without difficulty.  When he went home, he checked his INR and noted it was 2.2, which he states is low for him so he took 5 mg of his Coumadin.  States that Dr. Teran (cardiologist) keeps his INR target 2.5-3.5. States that he is able to tell when he goes in and out of A. fib, denies feeling associated symptoms.  Patient notes over the past two days he has had some ataxia and feeling off balance with walking.  In ED patient continued to complain of lightheaded dizziness.      Dizziness  Associated symptoms: no chest pain, no headaches, no nausea, no shortness of breath, no vomiting and no weakness    Paresthesia  Associated symptoms: no abdominal pain, no chest pain, no fever, no headaches, no nausea, no rash, no shortness of breath and no vomiting          Patient History   PAST HISTORY     Reviewed from Nursing Triage:      Past Medical History:   Diagnosis Date   • A-fib (CMS/[facility])    • Arthritis     OA   • Bacterial endocarditis    • Deep vein thrombosis (CMS/[facility])     LLE over 30 years ago   •  History of transfusion     for MV replacement x34 years ago   • Hypertension    • Kidney stones    • Lipid disorder    • Myocardial infarction (CMS/HCC)    • SCC (squamous cell carcinoma)     right ear   • Sleep apnea     wears CPAP   • Type 2 diabetes mellitus (CMS/HCC)     last A1C 11/2018   • Venous insufficiency        Past Surgical History:   Procedure Laterality Date   • CARDIAC ELECTROPHYSIOLOGY MAPPING AND ABLATION     • CARDIAC SURGERY     • KIDNEY STONE SURGERY     • KNEE SURGERY     • MITRAL VALVE REPLACEMENT     • TONSILLECTOMY         History reviewed. No pertinent family history.    Social History     Tobacco Use   • Smoking status: Never Smoker   • Smokeless tobacco: Never Used   Substance Use Topics   • Alcohol use: Yes     Alcohol/week: 3.0 standard drinks     Types: 3 Shots of liquor per week   • Drug use: No         Review of Systems   REVIEW OF SYSTEMS     Review of Systems   Constitutional: Negative for fever.   Eyes: Negative for visual disturbance.   Respiratory: Negative for shortness of breath.    Cardiovascular: Negative for chest pain.   Gastrointestinal: Negative for abdominal pain, nausea and vomiting.   Genitourinary: Negative for dysuria.   Musculoskeletal: Positive for gait problem. Negative for arthralgias.   Skin: Negative for rash.   Neurological: Positive for dizziness and numbness. Negative for facial asymmetry, weakness and headaches.   Hematological: Bruises/bleeds easily.         VITALS     ED Vitals    Date/Time Temp Pulse Resp BP SpO2 [facility]   06/05/21 2100 36.7 °C (98 °F) 74 16 143/70 97 % AEL   06/05/21 2051 -- 73 20 148/70 97 % LDM   06/05/21 1743 -- 65 20 150/71 96 % MG   06/05/21 1704 -- 72 20 144/71 98 % MG   06/05/21 1524 -- 75 18 140/63 98 % MG   06/05/21 1408 36.8 °C (98.3 °F) 78 16 183/74 98 % MG        Pulse Ox %: 98 % (06/05/21 1442)  Pulse Ox Interpretation: Normal (06/05/21 1442)  Heart Rate: 78 (06/05/21 1442)  Rhythm Strip Interpretation: Normal Sinus Rhythm  (06/05/21 1442)     Physical Exam   PHYSICAL EXAM     Physical Exam  Vitals and nursing note reviewed.   Constitutional:       Appearance: Normal appearance.   HENT:      Head: Atraumatic.   Eyes:      Extraocular Movements: Extraocular movements intact.      Conjunctiva/sclera: Conjunctivae normal.      Pupils: Pupils are equal, round, and reactive to light.   Cardiovascular:      Rate and Rhythm: Normal rate and regular rhythm.      Comments: Artificial heart click consistent with valve replacement  Pulmonary:      Effort: Pulmonary effort is normal. No respiratory distress.      Breath sounds: Normal breath sounds.   Abdominal:      General: Abdomen is flat. Bowel sounds are normal. There is no distension.      Tenderness: There is no abdominal tenderness.   Musculoskeletal:         General: Normal range of motion.      Cervical back: Normal range of motion.      Right lower leg: Edema present.      Left lower leg: Edema present.      Comments: +1 edema BL ankles (baseline per patient)   Skin:     General: Skin is warm and dry.      Capillary Refill: Capillary refill takes less than 2 seconds.   Neurological:      General: No focal deficit present.      Mental Status: He is alert and oriented to person, place, and time.      Comments: No facial weakness  Moves all extremities equally, no gross motor deficit as tested  Sensation intact to light touch along bilateral face and all extremities  Speech clear   Psychiatric:         Mood and Affect: Mood normal.         Behavior: Behavior normal.           PROCEDURES     Procedures     DATA     Results     Procedure Component Value Units Date/Time    SARS-CoV-2 (COVID-19), PCR Nasopharynx [[id number]]  (Normal) Collected: 06/05/21 1823    Specimen: Nasopharyngeal Swab from Nasopharynx Updated: 06/05/21 1907    Narrative:      The following orders were created for panel order SARS-CoV-2 (COVID-19), PCR Nasopharynx.  Procedure                               Abnormality          Status                     ---------                               -----------         ------                     SARS-CoV-2 (COVID-19), P...[[id number]]  Normal              Final result                 Please view results for these tests on the individual orders.    SARS-CoV-2 (COVID-19), PCR Nasopharynx [[id number]]  (Normal) Collected: 06/05/21 1823    Specimen: Nasopharyngeal Swab from Nasopharynx Updated: 06/05/21 1907     SARS-CoV-2 (COVID-19) Negative     Comment: EUA/IVD       Narrative:      Nursing instructions: Obtain nasopharyngeal swab ONLY.  Send swab in viral transport media.    Troponin I [[id number]]  (Normal) Collected: 06/05/21 1410    Specimen: Blood, Venous Updated: 06/05/21 1451     Troponin I 0.02 ng/mL     Comprehensive metabolic panel [[id number]]  (Abnormal) Collected: 06/05/21 1410    Specimen: Blood, Venous Updated: 06/05/21 1447     Sodium 138 mEQ/L      Potassium 4.9 mEQ/L      Comment: Results obtained on plasma. Plasma Potassium values may be up to 0.4 mEQ/L less than serum values. The differences may be greater for patients with high platelet or white cell counts.        Chloride 106 mEQ/L      CO2 21 mEQ/L      BUN 32 mg/dL      Creatinine 1.2 mg/dL      Glucose 234 mg/dL      Calcium 9.6 mg/dL      AST (SGOT) 33 IU/L      ALT (SGPT) 33 IU/L      Alkaline Phosphatase 74 IU/L      Total Protein 7.2 g/dL      Comment: Test performed on plasma which typically contains approximately 0.4 g/dL more protein than serum.        Albumin 4.8 g/dL      Bilirubin, Total 1.3 mg/dL      eGFR 59.3 mL/min/1.73m*2      Anion Gap 11 mEQ/L     APTT [[id number]]  (Normal) Collected: 06/05/21 1410    Specimen: Blood, Venous Updated: 06/05/21 1437     PTT 34 sec     Protime-INR [[id number]]  (Abnormal) Collected: 06/05/21 1410    Specimen: Blood, Venous Updated: 06/05/21 1437     PT 22.3 sec      INR 2.1     Comment: Moderate Intensity Anticoagulation = 2.0 to 3.0, High Intensity = 2.5 to 3.5       CBC and  differential [[id number]]  (Abnormal) Collected: 06/05/21 1410    Specimen: Blood, Venous Updated: 06/05/21 1428     WBC 6.06 K/uL      RBC 4.78 M/uL      Hemoglobin 15.4 g/dL      Hematocrit 45.3 %      MCV 94.8 fL      MCH 32.2 pg      MCHC 34.0 g/dL      RDW 12.7 %      Platelets 145 K/uL      MPV 10.0 fL      Differential Type Auto     nRBC 0.0 %      Immature Granulocytes 0.8 %      Neutrophils 69.3 %      Lymphocytes 17.8 %      Monocytes 8.4 %      Eosinophils 3.0 %      Basophils 0.7 %      Immature Granulocytes, Absolute 0.05 K/uL      Neutrophils, Absolute 4.20 K/uL      Lymphocytes, Absolute 1.08 K/uL      Monocytes, Absolute 0.51 K/uL      Eosinophils, Absolute 0.18 K/uL      Basophils, Absolute 0.04 K/uL     RAINBOW RED [[id number]] Collected: 06/05/21 1410    Specimen: Blood, Venous Updated: 06/05/21 1414    [place] Draw Panel [[id number]] Collected: 06/05/21 1410    Specimen: Blood, Venous Updated: 06/05/21 1414    Narrative:      The following orders were created for panel order [place] Draw Panel.  Procedure                               Abnormality         Status                     ---------                               -----------         ------                     RAINBOW RED[[id number]]                                      In process                 RAINBOW GOLD[[id number]]                                     In process                   Please view results for these tests on the individual orders.    RANCHO GOLD [[id number]] Collected: 06/05/21 1410    Specimen: Blood, Venous Updated: 06/05/21 1414          Imaging Results          X-RAY CHEST 1 VIEW (Final result)  Result time 06/05/21 16:10:08    Final result                 Impression:    IMPRESSION: No acute cardiopulmonary process.             Narrative:    CLINICAL HISTORY: Near syncope    COMMENT:  Single view chest is compared the x-ray from 07/10/2014.    Cardiovascular silhouette within normal limits.  The patient status post  anatomy  and valve replacement.  There is a pacemaking device in place.  The costophrenic  angles are clear.  There is no focal lung consolidation or pneumothorax.                               CT HEAD WITHOUT IV CONTRAST (Final result)  Result time 06/05/21 15:16:53    Final result                 Impression:    IMPRESSION:  1.  No evidence of hemorrhage or mass.  2.  Age indeterminant right basal ganglia and right cerebellar tiny lacunar  infarcts.  If indicated MRI can be performed for further evaluation.    COMMENT:    Technique: Computed tomography of the brain was performed utilizing contiguous  2.5 mm transaxial sections without intravenous contrast administration.    CT DOSE:  One or more dose reduction techniques (e.g. automated exposure  control, adjustment of the mA and/or kV according to patient size, use of  iterative reconstruction technique) utilized for this examination.    Comparison studies: None.    Postsurgical change: None.    Brain parenchyma: There is no intraparenchymal hemorrhage, mass effect, midline  shift or extra-axial fluid collection.  Tiny right basal ganglia and right  cerebellar hypodensities.  White matter changes: Normal for age  Ventricles, cisterns, and sulci: Normal in size and configuration.  Sella and cerebellar tonsils: Normal in appearance.      Calvarium and extra cranial soft tissues: Normal.  Paranasal sinuses: Clear bilaterally.  Mastoid air cell: Normal  Orbits: Normal                 Narrative:    CLINICAL HISTORY: Vertigo, transient ischemic attack                                ECG 12 lead    (Results Pending)       Scoring tools                  NIH Stroke Scale: 0                ED Course & MDM   MDM / ED COURSE and CLINICAL IMPRESSIONS     MDM  Number of Diagnoses or Management Options     Amount and/or Complexity of Data Reviewed  Clinical lab tests: reviewed  Tests in the radiology section of CPT®: reviewed  Discuss the patient with other providers:  yes    Patient Progress  Patient progress: stable      ED Course as of Jun 05 2254   Sat Jun 05, 2021   1440 Impression: Light headed dizziness with right facial paresthesia around 10:30am - lasted for approximately 3 min and now resolved  NIH 0  Plan: Labs, trop, coags, CT brain, cxray, EKG, IV fluids, observe    [EP]   1454 Sub-therapeutic for patient   INR: 2.1 [EP]   1640 IMPRESSION:  1.  No evidence of hemorrhage or mass.  2.  Age indeterminant right basal ganglia and right cerebellar tiny lacunar  infarcts.  If indicated MRI can be performed for further evaluation.   CT HEAD WITHOUT IV CONTRAST [EP]   1649 Patient reports that he is had an ataxic gait for the past 2 days, concerned that he is showing an acute stroke on head CT based on his symptoms.  Patient is currently subtherapeutic with his INR.  Discussed with Dr. Briones, will admit patient for further work-up.  325 mg aspirin ordered.    [EP]      ED Course User Index  [EP] Pallante, Elizabeth P, CRNP         Clinical Impressions as of Jun 05 2254   Dizziness   Facial paresthesia   Ataxia   Cerebrovascular accident (CVA), unspecified mechanism (CMS/HCC)            Pallante, Elizabeth P, CRNP  06/05/21 8820

## 2021-06-05 NOTE — Clinical Note
Patient position: side-lying. Bite block inserted. Probe inserted without difficulty. Probe insertion attempts: 1. JOSELIN in progress.

## 2021-06-06 PROBLEM — I63.9 CVA (CEREBRAL VASCULAR ACCIDENT) (CMS/HCC): Status: ACTIVE | Noted: 2021-06-05

## 2021-06-06 PROBLEM — Z01.818 PREOPERATIVE EXAMINATION: Status: RESOLVED | Noted: 2019-04-02 | Resolved: 2021-06-06

## 2021-06-06 PROBLEM — R33.9 URINARY RETENTION: Status: RESOLVED | Noted: 2019-04-17 | Resolved: 2021-06-06

## 2021-06-06 PROBLEM — Z95.810 PRESENCE OF COMBINATION INTERNAL CARDIAC DEFIBRILLATOR (ICD) AND PACEMAKER: Status: ACTIVE | Noted: 2021-06-06

## 2021-06-06 LAB
ANION GAP SERPL CALC-SCNC: 10 MEQ/L (ref 3–15)
APTT PPP: 121 SEC (ref 23–35)
APTT PPP: 70 SEC (ref 23–35)
ATRIAL RATE: 267
BUN SERPL-MCNC: 27 MG/DL (ref 8–20)
CALCIUM SERPL-MCNC: 9 MG/DL (ref 8.9–10.3)
CHLORIDE SERPL-SCNC: 107 MEQ/L (ref 98–109)
CHOLEST SERPL-MCNC: 113 MG/DL
CO2 SERPL-SCNC: 22 MEQ/L (ref 22–32)
CREAT SERPL-MCNC: 0.9 MG/DL (ref 0.8–1.3)
ERYTHROCYTE [DISTWIDTH] IN BLOOD BY AUTOMATED COUNT: 12.8 % (ref 11.6–14.4)
EST. AVERAGE GLUCOSE BLD GHB EST-MCNC: 177 MG/DL
GFR SERPL CREATININE-BSD FRML MDRD: >60 ML/MIN/1.73M*2
GLUCOSE BLD-MCNC: 137 MG/DL (ref 70–99)
GLUCOSE BLD-MCNC: 222 MG/DL (ref 70–99)
GLUCOSE SERPL-MCNC: 138 MG/DL (ref 70–99)
HBA1C MFR BLD HPLC: 7.8 %
HCT VFR BLDCO AUTO: 43.1 % (ref 40.1–51)
HDLC SERPL-MCNC: 25 MG/DL
HDLC SERPL: 4.5 {RATIO}
HGB BLD-MCNC: 14.7 G/DL (ref 13.7–17.5)
INR PPP: 2.5
LDLC SERPL CALC-MCNC: 39 MG/DL
MAGNESIUM SERPL-MCNC: 2.1 MG/DL (ref 1.8–2.5)
MCH RBC QN AUTO: 32 PG (ref 28–33.2)
MCHC RBC AUTO-ENTMCNC: 34.1 G/DL (ref 32.2–36.5)
MCV RBC AUTO: 93.9 FL (ref 83–98)
NONHDLC SERPL-MCNC: 88 MG/DL
PDW BLD AUTO: 9.9 FL (ref 9.4–12.4)
PLATELET # BLD AUTO: 132 K/UL (ref 150–350)
POCT TEST: ABNORMAL
POCT TEST: ABNORMAL
POTASSIUM SERPL-SCNC: 4.2 MEQ/L (ref 3.6–5.1)
PROTHROMBIN TIME: 25.5 SEC (ref 12.2–14.5)
QRS DURATION: 114
QT INTERVAL: 414
QTC CALCULATION(BAZETT): 471
R AXIS: 66
RBC # BLD AUTO: 4.59 M/UL (ref 4.5–5.8)
SODIUM SERPL-SCNC: 139 MEQ/L (ref 136–144)
T WAVE AXIS: -88
TRIGL SERPL-MCNC: 245 MG/DL (ref 30–149)
TSH SERPL DL<=0.05 MIU/L-ACNC: 2.12 MIU/L (ref 0.34–5.6)
VENTRICULAR RATE: 78
WBC # BLD AUTO: 4.95 K/UL (ref 3.8–10.5)

## 2021-06-06 PROCEDURE — 84443 ASSAY THYROID STIM HORMONE: CPT | Performed by: NURSE PRACTITIONER

## 2021-06-06 PROCEDURE — 85610 PROTHROMBIN TIME: CPT | Performed by: NURSE PRACTITIONER

## 2021-06-06 PROCEDURE — 63700000 HC SELF-ADMINISTRABLE DRUG: Performed by: INTERNAL MEDICINE

## 2021-06-06 PROCEDURE — 63600000 HC DRUGS/DETAIL CODE: Performed by: NURSE PRACTITIONER

## 2021-06-06 PROCEDURE — 80048 BASIC METABOLIC PNL TOTAL CA: CPT | Performed by: NURSE PRACTITIONER

## 2021-06-06 PROCEDURE — 80061 LIPID PANEL: CPT | Performed by: NURSE PRACTITIONER

## 2021-06-06 PROCEDURE — 85730 THROMBOPLASTIN TIME PARTIAL: CPT | Performed by: INTERNAL MEDICINE

## 2021-06-06 PROCEDURE — 83735 ASSAY OF MAGNESIUM: CPT | Performed by: NURSE PRACTITIONER

## 2021-06-06 PROCEDURE — 83036 HEMOGLOBIN GLYCOSYLATED A1C: CPT | Performed by: NURSE PRACTITIONER

## 2021-06-06 PROCEDURE — 36415 COLL VENOUS BLD VENIPUNCTURE: CPT | Performed by: INTERNAL MEDICINE

## 2021-06-06 PROCEDURE — 99232 SBSQ HOSP IP/OBS MODERATE 35: CPT | Performed by: FAMILY MEDICINE

## 2021-06-06 PROCEDURE — 20600000 HC ROOM AND CARE INTERMEDIATE/TELEMETRY

## 2021-06-06 PROCEDURE — 63700000 HC SELF-ADMINISTRABLE DRUG: Performed by: NURSE PRACTITIONER

## 2021-06-06 PROCEDURE — 85027 COMPLETE CBC AUTOMATED: CPT | Performed by: NURSE PRACTITIONER

## 2021-06-06 RX ORDER — WARFARIN SODIUM 5 MG/1
5 TABLET ORAL DAILY
Status: DISCONTINUED | OUTPATIENT
Start: 2021-06-06 | End: 2021-06-07 | Stop reason: HOSPADM

## 2021-06-06 RX ADMIN — MULTIPLE VITAMINS W/ MINERALS TAB 1 TABLET: TAB at 08:59

## 2021-06-06 RX ADMIN — ACETAMINOPHEN 650 MG: 325 TABLET, FILM COATED ORAL at 06:20

## 2021-06-06 RX ADMIN — ROSUVASTATIN CALCIUM 20 MG: 20 TABLET, FILM COATED ORAL at 00:02

## 2021-06-06 RX ADMIN — DOFETILIDE 250 MCG: 250 CAPSULE ORAL at 22:22

## 2021-06-06 RX ADMIN — DOFETILIDE 250 MCG: 250 CAPSULE ORAL at 08:59

## 2021-06-06 RX ADMIN — DOFETILIDE 250 MCG: 250 CAPSULE ORAL at 00:02

## 2021-06-06 RX ADMIN — INSULIN DETEMIR 50 UNITS: 100 INJECTION, SOLUTION SUBCUTANEOUS at 00:03

## 2021-06-06 RX ADMIN — INSULIN DETEMIR 50 UNITS: 100 INJECTION, SOLUTION SUBCUTANEOUS at 22:07

## 2021-06-06 RX ADMIN — ROSUVASTATIN CALCIUM 20 MG: 20 TABLET, FILM COATED ORAL at 17:23

## 2021-06-06 RX ADMIN — ASPIRIN 81 MG: 81 TABLET, COATED ORAL at 08:59

## 2021-06-06 RX ADMIN — TAMSULOSIN HYDROCHLORIDE 0.4 MG: 0.4 CAPSULE ORAL at 08:59

## 2021-06-06 RX ADMIN — Medication 1000 UNITS: at 08:59

## 2021-06-06 RX ADMIN — WARFARIN SODIUM 5 MG: 5 TABLET ORAL at 17:23

## 2021-06-06 NOTE — ASSESSMENT & PLAN NOTE
This patient most likely suffered from a subacute infarction several days ago and from my review of the CT, I think it more likely that the stroke in the right basal ganglia was more responsible for his imbalance.  Neither of these lesions would account for his right facial numbness which was relatively brief and probably left no residual.  MRI might be of interest with respect to whether there are other subclinical emboli but would not change the plans for treatment.  As above, I had directed my agreement with the heparin last night and it has been stopped now that he is therapeutic.  Question was raised by epic regarding VTE prophylaxis, which is clearly inappropriate in this man already anticoagulated for that purpose!  Although I suspect that the 2 most prominent small strokes were embolic particularly the one in the cerebellum, either of them might have been from small vessel disease and in a hypertensive diabetic, that would not be surprising but I do not think there is enough value in the considering the patient to be an aspirin failure to run the risk of hemorrhage generally by placing him on aspirin and Plavix with the Coumadin or switching him to Plavix alone.  If he was to experience stroke or TIA type events through fully therapeutic Coumadin, then that might be a consideration.  I discussed that with the patient and his wife and they are aware.    The patient does not seem to require physical therapy or any other particular neurological follow-up and understands that as I have closed my outpatient practice I would not be able to follow him up myself in any event but he is aware that there are others who could do so if needed.  From the standpoint of small vessel disease, I have strongly emphasized the importance of strict hypertension and diabetic management.

## 2021-06-06 NOTE — ASSESSMENT & PLAN NOTE
-Present w/ chief complaint of dizziness/R facial numbness x 1 day and intermittent ataxia x 4 days  -CT head on admission consistent with R basal ganglia & R cerebellar tiny lacunar infarcts  - New INR goal 3 - 3.5   - Seen by cardiology and neurology

## 2021-06-06 NOTE — NURSING NOTE
Pt's wife brought in OWN DM med ozempic, weekly injectable. Writer called RX and they will be by to pick and barcode.

## 2021-06-06 NOTE — PROGRESS NOTES
Hospital Medicine Service -  Daily Progress Note       SUBJECTIVE   Interval History:     No acute overnight events. Mr. Stiles was seen and examined at bedside. He feels well and has no concerns. All of his symptoms have resolved. Denies fevers, chills, dyspnea, cough, chest pain, palpitations, abdominal pain, nausea, vomiting, diarrhea, numbness/tinlging, slurred speech, weakness.      OBJECTIVE      Vital signs in last 24 hours:  Temp:  [36.3 °C (97.4 °F)-36.8 °C (98.2 °F)] 36.8 °C (98.2 °F)  Heart Rate:  [65-75] 69  Resp:  [14-20] 18  BP: (134-156)/(60-76) 138/65    Intake/Output Summary (Last 24 hours) at 6/6/2021 1424  Last data filed at 6/6/2021 1300  Gross per 24 hour   Intake 120 ml   Output 400 ml   Net -280 ml       PHYSICAL EXAMINATION      Physical Exam    General: No acute distress. Non toxic appearing.   HEENT: NC/AT PERRLA EOMI MMM  Respiratory: Clear breath sounds bilaterally. No wheezing, rhonchi, rales. Non labored breathing. No accessory muscle use  Cardiovascular: RRR. Normal S1 and S2.    Abdomen: Soft, non distended, non tender. Bowel sounds present.   Psychiatric: Calm and cooperative. Alert and oriented to person, place, time, and situation.   Neurologic: CN 2-12 grossly intact. No focal neurological deficits.   Extremities: No clubbing, cyanosis, or edema.      LINES, CATHETERS, DRAINS, AIRWAYS, AND WOUNDS   Lines, Drains, and Airways:  Wounds (agree with documentation and present on admission):  Peripheral IV 06/05/21 Left Antecubital (Active)   Number of days: 1       Peripheral IV 06/05/21 Posterior;Right Hand (Active)   Number of days: 1           LABS / IMAGING / TELE      Labs  CBC Results       06/06/21 06/05/21 04/17/19                    0618 1410 0343         WBC 4.95 6.06 5.56         RBC 4.59 4.78 3.76         HGB 14.7 15.4 11.9         HCT 43.1 45.3 35.9         MCV 93.9 94.8 95.5         MCH 32.0 32.2 31.6         MCHC 34.1 34.0 33.1          145 110         Comment  for PLT at 0618 on 06/06/21: ALL RESULTS HAVE BEEN RECHECKED. SPECIMEN QUALITY CHECKED        CMP Results       06/06/21 06/05/21 04/18/19                    0618 1410 0355          138 143         K 4.2 4.9 5.0         Cl 107 106 109         CO2 22 21 24         Glucose 138 234 137         BUN 27 32 18         Creatinine 0.9 1.2 1.0         Calcium 9.0 9.6 9.0         Anion Gap 10 11 10         AST -- 33 --         ALT -- 33 --         Albumin -- 4.8 --         EGFR >60.0 59.3 >60.0         Comment for K at 1410 on 06/05/21: Results obtained on plasma. Plasma Potassium values may be up to 0.4 mEQ/L less than serum values. The differences may be greater for patients with high platelet or white cell counts.            SARS-CoV-2 (COVID-19) (no units)   Date/Time Value   06/05/2021 1823 Negative          ECG/Telemetry  I have independently reviewed the telemetry. No events for the last 24 hours.    ASSESSMENT AND PLAN      Presence of combination internal cardiac defibrillator (ICD) and pacemaker  Assessment & Plan  -Per patient report, NOT MRI compatible  -Continue outpatient follow up with Dr. Alarcon.    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 Monterroso at [facility] & Dr. Teran of [facility]  -On daily coumadin therapy with goal INR 2.7-3.1  -INR level subtherapeutic at 2.1 on admission  -Possibly source of embolic foci.    Plan:  -Continue coumadin therapy w/ daily INR levels  - Discontinued heparin drip   - Continue to monitor INR  -Cardiology consult.   -TTE pending.    HLD (hyperlipidemia)  Assessment & Plan  -Continue home rosuvastatin  -Hold Vascepa while hospitalized given risk of bleeding on dual antiplatelet/anticoagulation  -Lipid panel in AM for further CVA risk stratification.    Atrial fibrillation (CMS/HCC)  Assessment & Plan  -H/O Afib s/p  ablation x3, now with PM/ICD  -Continue anticoagulation & home antiarrhythmics        Essential hypertension  Assessment & Plan  -BP normotensive  -Hold coreg & losartan x 24 hours to allow for permissive hypertension in the setting of CVA.      Type 2 diabetes mellitus, with long-term current use of insulin (CMS/[facility])  Assessment & Plan  -Diabetic diet  -ACHS accuchecks with sliding scale insulin  -Hold Synjardy oral diabetic medication in the setting of acute hospitalization pending need for further testing.  -Continue home levemir & Ozempic (patient may use own med)    SANA on CPAP  Assessment & Plan  -OK for patient to use home CPAP    * CVA (cerebral vascular accident) (CMS/[facility])  Assessment & Plan  -Present w/ chief complaint of dizziness/R facial numbness x 1 day and intermittent ataxia x 4 days  -CT head on admission consistent with R basal ganglia & R cerebellar tiny lacunar infarctS  -CT reviewed by oncall neurologist Dr. Arce who suggests these infarcts are distinctly old  -Likely embolic in nature, given extensive cardiac history of PAF & mechanical valve repair.  -Given full strength ASA in ED; not a candidate for tPA    Plan:  -Admit to tele neuro floor with neuro checks, NIH scales & official neurology evaluation  -Unable to undergo further testing with MRI given patient's status of PM/ICD  -Continue w/ home medication regimen which includes daily aspirin, anticoagulation, antiarrhythmics & statin therapy.  -TTE pending  -Cardiology consulted  - Neurology consulted.          VTE Assessment: [place] VTE Score: 3  VTE Prophylaxis:  Current anticoagulants:  warfarin (COUMADIN) tablet 5 mg, oral, Daily      Code Status: Full Code      Estimated Discharge Date: 6/7/2021    Disposition Planning: Home     Tripp Walker MD  6/6/2021

## 2021-06-06 NOTE — CONSULTS
REASON FOR CONSULT: Acute CVA    CONSULT FROM: Tripp Walker MD    ------------------------------------------------------------------------------------------------------------------------------------------  HISTORY OF PRESENTING ILLNESS  ------------------------------------------------------------------------------------------------------------------------------------------  Roberto Stiles is a 75 y.o. male, known to Dr. Teran, who is admitted with transient acute neurologic symptoms including numbness of the face, difficulty circulating.  Apparently patient [place] the golf range the morning of admission, developed difficulty articulating and some numbness in the face, he sat in the car for several minutes it gradually subsided so he had few balls and then decided to come to the hospital.  Initial CAT scan of the head revealed age indeterminant right basal ganglia and right cerebellar tiny lacunar infarcts.  He had no previous history of stroke.  He denies any recent cardiac symptoms particular chest pain, shortness of breath, palpitations, syncope.  He has been complaining of edema and some ulceration in his legs.  Patient has been noncompliant with his warfarin and home INR monitoring.  His INR goal is 2-1/2-3 now.  Recently has been dieting with a green leafy vegetables and his most recent INR was subtherapeutic at 2.2.    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 RWMAs noted and microbubble LV contrast was given to assess wall motion and ejection fraction. Dyskinesis of inferoseptum and anteroseptum of mid-ventricle to apex. [place] is thinned and aneurysmal. Severe hypokinesis in inferior wall from base to mid-ventricle. LA severely dilated. Mechanical mitral valve well-seated. Trace TR.     Stress Test 11/16/2018 ([facility]):   Evidence for MY in apical segment. LVEF mildly decreased (47%). Fixed apical infarct with corresponding dyskinetic segment. No ischemia identified on this PET scan stress.    Echocardiogram (2015): LVEF 45% with WMAs consistent with CAD, [facility] MVR without MR      JOSELIN (2015): LVEF 50%, severely dilated LA, [facility] MVR with mild MR     Cardiac MRI (2014): LVEF 45%, transmural myocardial delayed enhancement of the distal septal wall is present consistent with prior infarction.      Cardiac Catheterization (2006): LM 30% ostial stenosis, LAD 40% proximal, 50% at junction of diagonal, 90% mid, LCx with luminal irregularities, RCA dominant and 40% mid, LV akinesis in distal anterior wall/apex, EF 45-50%     ------------------------------------------------------------------------------------------------------------------------------------------  PAST MEDICAL HISTORY  ------------------------------------------------------------------------------------------------------------------------------------------  Past Medical History:   Diagnosis Date   • A-fib (CMS/HCC)    • Arthritis     OA   • Bacterial endocarditis    • Deep vein thrombosis (CMS/HCC)     LLE over 30 years ago   • History of transfusion     for MV replacement x34 years ago   • Hypertension    • Kidney stones    • Lipid disorder    • Myocardial infarction (CMS/HCC)    • SCC  (squamous cell carcinoma)     right ear   • Sleep apnea     wears CPAP   • Type 2 diabetes mellitus (CMS/[facility])     last A1C 11/2018   • Venous insufficiency      Past Surgical History:   Procedure Laterality Date   • CARDIAC ELECTROPHYSIOLOGY MAPPING AND ABLATION     • CARDIAC SURGERY     • KIDNEY STONE SURGERY     • KNEE SURGERY     • MITRAL VALVE REPLACEMENT     • TONSILLECTOMY         ------------------------------------------------------------------------------------------------------------------------------------------  MEDICATIONS  ------------------------------------------------------------------------------------------------------------------------------------------  Home medications    •  aspirin, Take 81 mg by mouth daily.  •  carvediloL, Take 12.5 mg by mouth 2 (two) times a day with meals.  •  cholecalciferol (vitamin D3), Take 1,000 Units by mouth daily.  •  dofetilide, Take 250 mcg by mouth 2 (two) times a day.  •  empagliflozin-metformin, Take 1 tablet by mouth 2 (two) times a day.  •  FISH OIL-omega-3 fatty acids, Take 1 capsule by mouth 2 (two) times a day. Hold for 2 weeks - may increase bleeding risk  •  furosemide, Take 20 mg by mouth daily. As needed for leg swelling  •  insulin detemir U-100, Inject 50 Units under the skin nightly.    •  losartan, Take 100 mg by mouth every evening.    •  multivitamin, Take by mouth daily.  •  NOT IN DATABASE, Prevagen 10mg daily  •  potassium chloride, Take 20 mEq by mouth daily.  •  rosuvastatin, Take 20 mg by mouth every evening.    •  semaglutide, Inject 1 mg/mL under the skin once a week. Patient takes on SUNday  •  tamsulosin, Take 1 capsule (0.4 mg total) by mouth daily.  •  warfarin, Take 2.5 mg by mouth once a week. Jared Gómez Sun   •  warfarin, Take 5 mg by mouth every evening. Mon, Wed, Fri, Sat   •  sennosides-docusate sodium, Take 1 tablet by mouth 2 (two) times a day. To prevent constipation associated with pain medication, hold for loose  stool    Inpatient Medications    •  acetaminophen, 650 mg, oral, q4h PRN  •  alum-mag hydroxide-simeth, 30 mL, oral, q4h PRN  •  aspirin, 81 mg, oral, Daily  •  bisacodyL, 10 mg, rectal, Daily PRN  •  cholecalciferol (vitamin D3), 1,000 Units, oral, Daily  •  glucose, 16-32 g of dextrose, oral, PRN **OR** dextrose, 15-30 g of dextrose, oral, PRN **OR** glucagon, 1 mg, intramuscular, PRN **OR** dextrose in water, 25 mL, intravenous, PRN  •  dofetilide, 250 mcg, oral, BID  •  heparin (porcine), 15-30 Units/kg (Adjusted), intravenous, q6h PRN  •  heparin infusion - MAR calculator by PTT, 0-4,000 Units/hr, intravenous, Titrated  •  insulin aspart U-100, 6-10 Units, subcutaneous, With meals & nightly  •  insulin detemir U-100, 50 Units, subcutaneous, Nightly  •  magnesium sulfate, 1 g, intravenous, PRN **OR** magnesium sulfate, 2 g, intravenous, PRN  •  multivitamin, 1 tablet, oral, Daily  •  potassium chloride, 20 mEq, oral, PRN **OR** potassium chloride, 40 mEq, oral, PRN **OR** potassium chloride, 20 mEq, intravenous, PRN **OR** potassium chloride, 40 mEq, intravenous, PRN **OR** potassium bicarbonate, 20 mEq, oral, PRN **OR** potassium bicarbonate, 40 mEq, oral, PRN  •  rosuvastatin, 20 mg, oral, q PM  •  semaglutide, 3 mL, subcutaneous, Weekly  •  senna, 1 tablet, oral, 2x daily PRN  •  tamsulosin, 0.4 mg, oral, Daily  •  warfarin, 2.5 mg, oral, Once per day on Sun Tue Thu  •  [START ON 6/7/2021] warfarin, 5 mg, oral, Once per day on Mon Wed Fri Sat    ------------------------------------------------------------------------------------------------------------------------------------------  ALLERGIES  ------------------------------------------------------------------------------------------------------------------------------------------  Patient has no known  allergies.    ------------------------------------------------------------------------------------------------------------------------------------------  SOCIAL HISTORY  ------------------------------------------------------------------------------------------------------------------------------------------  No smoking or alcohol    ------------------------------------------------------------------------------------------------------------------------------------------  FAMILY HISTORY  ------------------------------------------------------------------------------------------------------------------------------------------  [01]  Father:  No FH Hypertension;  (5/8/2015).  [01]  Father:  No FH Ischemic heart disease;  (5/8/2015).  [01]  Father:  No FH TIA or CVA;  (5/8/2015).  [01]  Mother:  No FH Hypertension;  (5/8/2015).  [01]  Mother:  No FH Ischemic heart disease;  (5/8/2015).  [01]  Mother:  No FH TIA or CVA;  (5/8/2015).    ------------------------------------------------------------------------------------------------------------------------------------------  REVIEW OF SYSTEMS  ------------------------------------------------------------------------------------------------------------------------------------------  Constitutional: - fever, - chills, - weakness, - weight loss  HEENT: - blurred vision, - sore throat, - hoarseness  Respiratory: - dyspnea, - cough, - hemoptysis  Cardiovascular: - chest pain, - dyspnea, - orthopnea, - PND, - edema, - palpitations, - syncope  Gastrointestinal: - nausea, - vomiting, - diarrhea, - hematemesis, - melena  Genitourinary: - dysuria, - frequency  Integument: - rash, - itching  Hematologic/lymphatic:  - bruising, - petechiae  Musculoskeletal: - arthalgias, - myalgias  Neurological: - vertigo, - tremors, - headache, +speech deficit, + numbness of the face,- focal weakness  Behavioral/Psych: - anxiety, - depression  Endocrine: - cold intolerance, - heat intolerance, - weight  change    ------------------------------------------------------------------------------------------------------------------------------------------  PHYSICAL EXAM  ------------------------------------------------------------------------------------------------------------------------------------------  VITAL SIGNS:  Temp:  [36.3 °C (97.4 °F)-36.8 °C (98.3 °F)] 36.3 °C (97.4 °F)  Heart Rate:  [65-78] 71  Resp:  [14-20] 20  BP: (134-183)/(60-75) 134/75  SaO2: 98%  No intake or output data in the 24 hours ending 06/06/21 0802    PHYSICAL EXAM:  General appearance: alert and cooperative, overweight  Head: NCAT, no nuchal rigidity  Eyes: PERRLA, extraocular movements intact  Neck: No JVD, carotid bruits, thyromegaly  Lungs: clear to auscultation bilaterally, no crackles or wheezing  Heart: regular rate and rhythm, S1-S2 normal, systolic click is noted.  No obvious murmurs   abdomen: soft, non-tender, bowel sounds normal  Extremities: no edema, peripheral pulses present  Skin: Skin color, texture, turgor normal. No rashes or lesions  Neurologic: Alert and oriented X 3, no focal deficits    ------------------------------------------------------------------------------------------------------------------------------------------  LABS / IMAGING / EKG / TELEMETRY  ------------------------------------------------------------------------------------------------------------------------------------------  LABS:  Results from last 7 days   Lab Units 06/06/21 0618 06/05/21  1410   SODIUM mEQ/L 139 138   POTASSIUM mEQ/L 4.2 4.9   MAGNESIUM mg/dL 2.1  --    CHLORIDE mEQ/L 107 106   CO2 mEQ/L 22 21*   BUN mg/dL 27* 32*   CREATININE mg/dL 0.9 1.2   AST IU/L  --  33   ALT IU/L  --  33   TROPONIN I ng/mL  --  0.02     Results from last 7 days   Lab Units 06/06/21  0618 06/05/21  1410   WBC K/uL 4.95 6.06   HEMOGLOBIN g/dL 14.7 15.4   HEMATOCRIT % 43.1 45.3   PLATELETS K/uL 132* 145*   INR  2.5 2.1     Lab Results   Component Value Date     HGBA1C 7.2 (H) 04/02/2019    TSH 2.12 06/06/2021     Lab Results   Component Value Date    CHOL 113 06/06/2021    LDLCALC 39 06/06/2021    HDL 25 (L) 06/06/2021    TRIG 245 (H) 06/06/2021     No results found for: BNP    IMAGING:  I have independently reviewed the pertinent imaging from the last 24 hrs.    ECG:   Rhythm appears to be accelerated junctional, poor anterior R wave progressions nonspecific ST-T changes.    TELEMETRY:      ------------------------------------------------------------------------------------------------------------------------------------------  ASSESSMENT AND PLAN  ------------------------------------------------------------------------------------------------------------------------------------------  1.  Acute right cerebellar and basal ganglion infarct-described as lacunar.  Will need to determine from the neurology team, Dr. Ritchie if it potentially can be cardioembolic in origin.  Patient was subtherapeutically anticoagulated with initial INR of 2.1.  This could potentially be the cause of the stroke.  2.  VIKTORIA Puentes-I would like to have a JOSELIN to further rule out intracardiac thrombi look for cardiac or aortic source of embolism.  Most recent echocardiogram On 12/15/2020 showed LVEF of 50% mechanical mitral valve prosthesis appears to be functioning appropriately.  3.  Anticoagulation-was subtherapeutic.  Patient has been eating a lot more green leafy vegetables.  I discussed with patient that he need to monitor his INR closely and keep it above 2.5.  Patient is also on low-dose aspirin.  I started patient IV heparin to bridge until his INR is therapeutic again.  4.  Atrial fibrillation we will continue telemetry monitoring.  Patient had previous a flutter ablations, VT Rythmol induced on dofetilide electrolytes and renal functions are acceptable.  Consider EP consultation with Dr. Alarcon who knows the patient well.  5.  High risk medication-dofetilide-continue  monitoring patient's renal function, potassium, magnesium regularly.  6.  [employer] ICD-We will check with the office records last time it was interrogated.  Continue telemetry monitoring.  No inappropriate shocks reported by the patient.    Dr. Eric Teran will resume care of this patient as of tomorrow.      Roverto Saez MD  6/6/2021    Primary Care Doctor: Tyson Byrd MD     Most recent office visit note from Dr. Teran:    Date of service: 05/25/2021 8:35 AM    Background     Reason for Visit: Consultation    History from: Patient    Chief Complaint   #1: See HPI    History of Present Illness:  Roberto Stiles is a pleasant 75-year-old man with a history of mechanical mitral valve replacement due to bacterial endocarditis, atrial fibrillation, obesity, sleep apnea on CPAP who is referred to me for interventional consultation for venous insufficiency.  He reports having longstanding history venous insufficiency.  He has had prior work done in the bilateral great saphenous veins by radiofrequency ablation.  He has also had minor sclerotherapy procedures performed.  He has no shortness of breath with exertion, orthopnea PND symptoms.  He reports having edema all the time.  He reports that they come down overnight however his legs early generally fairly swelling.  He has difficulty wearing shin stockings due to his back issues.  He has never had a DVT or PE.  He has never had cellulitis.  He has not had wounds or blisters but he does have evidence of healed micro ulcers on his legs.  He has a lipodermatosclerosis pattern on both legs.  He has not seen a dermatologist or wound care specialist.  He has not had any broken bones.  He he did have a left knee replacement in 2019.  He has not been told he has lymphedema.  He does not have restless legs, itching, heaviness or tiredness or Charley horses.  He does have an ache due to his edema.  His most recent EF was 50%.  He does not have pulmonary  hypertension.  He does take furosemide 4 times a week on an as-needed basis.  There is no family history of venous insufficiency.  As above, I did mention he has sleep apnea and is on CPAP.  He does not have PAD or claudication symptoms.    Past Medical and Surgical Histories   Past Medical History (reviewed - no changes required):   Raine Shiley mitral valve replacement at age 36: May 14 1982  Paroxysmal A. fib  Parox Atrial Flutter- cardioverted 6/3/14  Right A. flutter ablation August 2014  Left A. flutter ablation August 2014  Sustained VT, secondary to Rythmol 2/06-VT ablation January 2019  Rheumatic fever at age 9  Sleep apnea  Coronary disease, embolic infarct to the LAD due to endocarditis.  ICD-[employer]    AAA negative Sep 2014  Carotid ultrasound.  July 2019-mild bilateral disease  Echo march 2013. LV apical aneurysm, EF 50%, mechanical mitral valve  Nuclear stress test March 2013 fixed apical defect no ischemia  JOSELIN June 2014. EF 50%, normal mechanical mitral valve function no LA clot  Cardioversion 6/1/15  A flutter ablation, A. fib ablation 11/05/2015 [facility]  Echo 11/06/2015 EF 45% mechanical AVR function normal  2-D echo 05/02/2017. EF 50%. LV apical aneurysm. Normal mechanical mitral valve. Mild TR, normal RV function, normal PA pressure, aortic valve sclerosis  Echo 12/15/2020: EF 50%, LV apical aneurysm, or other walls contract normally., Normal mechanical mitral valve, no significant MR, mild TR, normal RV function, normal PA pressure, aortic valve sclerosis  PET scan stress test 11/16/18: Fixed apical infarct no ischemia  Past Surgical History (reviewed - no changes required):   Raine Shiley mitral valve replacement age 36, 05/14/1982  ICD [employer]  A flutter ablation in August 2014  A flutter and A. fib ablation November 2015  Vein ablation May 2017  VT ablation January 2019  knee    L: CEAP Classification:  C3, C4, C5    R: CEAP Classification:  C3, C4, C5    L Pain: 0  Inflammation: 1 Induration: 2 Edema: 3 Varicose Veins: 1 Ulceration:  1  Compression Stockings: 0 Discoloration: 3    R Pain: 0 Inflammation: 1 Induration: 2 Edema: 3 Varicose Veins: 1 Ulceration:  1  Compression Stockings: 0 Discoloration: 3      Social History     Smoked Tobacco Usage    Smoking Status:  Never    Detailed Status:  Never smoker    Smokeless Tobacco Usage     Smokeless Status:   Never    Alcohol Usage     Alcohol Status:  Current    He drinks zero alcoholic drinks/day.    Average drink(s) / day: Zero    Family History   Structured Family History  [01]  Father:  No FH Hypertension;  (5/8/2015).  [01]  Father:  No FH Ischemic heart disease;  (5/8/2015).  [01]  Father:  No FH TIA or CVA;  (5/8/2015).  [01]  Mother:  No FH Hypertension;  (5/8/2015).  [01]  Mother:  No FH Ischemic heart disease;  (5/8/2015).  [01]  Mother:  No FH TIA or CVA;  (5/8/2015).  Family History (reviewed - no changes required):   No early coronary artery disease  Sudden cardiac death    Cardiac Catheterization History   Past Cath History:   February 2006. 90% distal LAD with LV apical aneurysm    Review of Systems   General: Denies weight gain and fatigue.   Cardiovascular: Acknowledges peripheral edema. Denies chest pain at rest, chest pain with exercise, palpitations and dyspnea on exertion.   Respiratory: Denies dyspnea at rest and shortness of breath.   Musculoskeletal: Denies myalgias.   Skin: Denies rash.   Neurologic: Denies pre-syncope and dizziness.     Physical Exam  Vital Signs:     Height: 72 inches  (05/25/2021).    Weight: 255 pounds    Body Mass Index: 34.71    Body Surface Area: 2.36 m2.  General: Well developed and well nourished.   Eyes: JONATHAN, conjunctiva and sclera clear and no xanthomas.   Neck: Supple, no adenopathy and no thyromegaly.   Lungs: Clear to auscultation.   Chest: No obvious deformity.   Heart: Regular rate and rhythm, S1 and S2 physiologic and PMI not displaced. Examination of neck veins  reveals no neck vein distention.  Carotid artery examination reveals no carotid bruits.    Extremities: There is no cyanosis or clubbing. Positive for venous stasis changes. Bilateral 2+ leg edema is present.    Pulses: Pedal pulses are normal bilaterally and radial pulses are normal bilaterally.   Abdomen: Normoactive bowel sounds and soft and non-tender.   Musculoskeletal: Normal gait and normal strength and tone.   Skin: Warm and dry and no rashes.   Neurologic: Alert, oriented x 3 and appropriate affect.         Medications:   WARFARIN SODIUM 5 MG TABLET (WARFARIN SODIUM) 1 TAB DAILY AS DIRECTED BY DR. BEATRIS NUNES  ASPIRIN 81 MG ORAL TABLET (ASPIRIN) once a day  DOFETILIDE 250 MCG CAPSULE (DOFETILIDE) TAKE 1 CAPSULE BY MOUTH TWICE A DAY  FUROSEMIDE 20 MG TABLET (FUROSEMIDE) TAKE 1 TABLET BY MOUTH EVERY DAY prn  COREG 12.5 MG ORAL TABLET (CARVEDILOL) Take one tablet twice daily  ROSUVASTATIN CALCIUM 20 MG TAB (ROSUVASTATIN CALCIUM) TAKE 1 TABLET BY MOUTH EVERY DAY  LOSARTAN POTASSIUM 100 MG TAB (LOSARTAN POTASSIUM) TAKE 1 TABLET BY MOUTH EVERY DAY  LEVEMIR FLEXPEN SOLUTION PEN-INJECTOR (INSULIN DETEMIR SOPN) 50UNITS once a day  LOVAZA 1 GM ORAL CAPSULE (OMEGA-3-ACID ETHYL ESTERS) Take one capsule twice daily  POTASSIUM CL ER 20 MEQ TABLET (POTASSIUM CHLORIDE) TAKE 1 TABLET BY MOUTH EVERY DAY  SYNJARDY 12.5-500 MG ORAL TABLET (EMPAGLIFLOZIN-METFORMIN HCL) TAKE ONE TABLET TWICE DAILY; Route: ORAL  OZEMPIC 1 MG/DOSE SUBCUTANEOUS SOLUTION PEN-INJECTOR (SEMAGLUTIDE) TAKE ONE INJECTION WEEKLY; Route: SUBCUTANEOUS  AMOXICILLIN 500 MG ORAL CAPSULE (AMOXICILLIN) Take for caps one hour prior to dental work; Route: ORAL  VASCEPA 1 GM CAPS (ICOSAPENT ETHYL) TAKE 1 CAPSULE BY MOUTH TWICE A DAY  Medications reviewed today.    Medications list verified with patient by Ander Hays M.D..    Allergies, Intolerances and/or Therapeutic Variances:    No known allergies and adverse reactions set during this update.  Allergies and  adverse reactions reviewed today.      Problems:   BODY MASS INDEX (BMI) 34 - 35 (ICD-V85.33) (OVA30-L94.34)  PERIPHERAL EDEMA (ICD-782.3) (WMT45-V23.9)  OBSTRUCTIVE SLEEP APNEA (ICD-327.23) (AZZ36-L60.33)  Note: WARFARIN USE (ICD-V58.61) (HKL66-T31.01)  CONSTIPATION, DRUG INDUCED (ICD-564.09) (OON80-T68.09)  SHORTNESS OF BREATH (ICD-786.05) (YOJ20-C55.02)  ATRIAL FLUTTER, PAROXYSMAL (ICD-427.32) (AIG09-V30.0)  SUSTAINED VENTRICULAR TACHYCARDIA (ICD-427.1) (HBN45-X30.2)  PAROXYSMAL ATRIAL FIBRILLATION (ICD-427.31) (AQZ11-M05.0)  PRIOR MYOCARDIAL INFARCTION (ICD-412) (QQL92-B88.2)  CAD, NATIVE VESSEL, W/O ANGINA (ICD-414.01) (VFU65-Y49.10)  DIABETES MELLITUS, TYPE II (ICD-250.00) (BSR77-K91.9)  AICD, S/P (ICD-V45.02) (RPT08-M68.810)  PROSTHETIC HEART VALVE (ICD-V43.3) (KTH50-G55.2)  Problems reviewed today.      Impression:  1)  Peripheral Edema :  His peripheral edema appears to be related to venous insufficiency of the untreated bilateral great saphenous vein segments.  This was visualized on his reflux ultrasound.  He has 2500 milliseconds of reflux in the right great saphenous vein.  He has about a 1000 milliseconds reflux in the left great saphenous vein.  I would recommend venaseal of both residual GSV segments.  I reviewed his prior work, outside chart, records.  I also reviewed our in-house ultrasounds.  We will start with compression stockings/wraps 20-30 mm Hg, knee-high.    2)  Obstructive Sleep Apnea :  He is compliant with CPAP.

## 2021-06-06 NOTE — PROGRESS NOTES
Patient: Roberto Stiles  Location: [facility] 3A 3005  MRN: [id number]  Today's date: 6/6/2021    Attempted to see patient for therapy. Unable due to patient refused (pt eating breakfast requested therapist to return; will continue to follow).

## 2021-06-06 NOTE — CONSULTS
Consult Note    Subjective     Reason for Consult: Evaluate stroke  Consulting Physician:  Fortunato Arce MD    History of Present Illness:  Roberto Stiles is a 75 y.o. male not previously known to me with a complex previous history of 4 episodes of endocarditis, with one episode occurring after he had a mechanical valve placed that was treated without replacement of the valve which she has now had for more than 30 years.  He has been on Coumadin for an extended time, checks his INR with his own equipment at home, and has run his INR at 2.5-3.5.  In an effort to get better control of his diabetes, he has been eating fewer carbohydrates and more green leafy salads and has not attended to his dropping INR as he might have with recent levels of 2.1 and 2.2 which are not so bad but perhaps not enough for him.  He also takes 80 mg of aspirin daily.    With that history, I was contacted last evening regarding this patient's presentation with unusual feelings of imbalance starting about 5 days ago, and persisting somewhat for 3 or 4 days, then clearing, then, on the day of admission, he had an episode where the right side of his face and tongue went numb and he became aware that his foot was somewhat clumsy, but decided that he would complete his intention to hit some golf balls at the golf course which she was able to do without difficulty as his symptoms cleared completely after less than half an hour, but he recognized later that this probably was a TIA and it was something that he should probably come to the ED to have evaluated and he did so.    I was contacted regarding his CT scan and indications for anticoagulation.  Specifically, the CT was interpreted as demonstrating lacunar type infarcts in the right cerebellum and right basal ganglia of indeterminate age.  I would suggest that the right cerebellar infarct is fairly discrete and very likely at least several weeks old and probably too old to account for the  relatively recent imbalance, but the lacune in the right basal ganglia while also fairly discrete over most of his edges, has a slightly separate smaller slightly hypodense area that may or may not represent new infarct of very small size.  The patient cannot have MRI because of the mechanical valve.  He was not a candidate for TPA by virtue of complete clearance of his symptoms with NIH stroke score of 0 and similarly was not a candidate for thrombectomy either.    However, because his INR was not ideal, there was question regarding the addition of heparin with the possibility of a new stroke on the CT.  While I suspect clinically that his most recent stroke was probably for 5 days ago, with the event on the day of admission representing something more like a TIA, but regardless, both of these small strokes are so small that given the concerns for further possible emboli given 2 distinct clinical events, despite an INR as above, that it was reasonable to give the patient full dose IV heparin pending the INR coming up to greater than 2.5, which he achieved overnight, and he has tolerated that well.  The risk of bleeding into even an acute stroke so small as either of the 2 observed infarcts, even if both had been acute, was far less than the potential risk of hemorrhage.    There is past medical history also of atrial fibrillation, previous ablation for A. fib, and CAD with an AICD, and history of diabetes, SANA, and otherwise as above.    He has no new other symptoms or return of previous symptoms.  He feels that his balance is completely unremarkable and his wife with him at the bedside agrees that she walked with him in the [place] today.    The patient continues to feel well with no residual.    Pertinent radiology results reviewed. Pertinent lab results reviewed.       Past Medical History:   Diagnosis Date   • A-fib (CMS/HCC)    • Arthritis     OA   • Bacterial endocarditis    • Deep vein thrombosis (CMS/HCC)      LLE over 30 years ago   • History of transfusion     for MV replacement x34 years ago   • Hypertension    • Kidney stones    • Lipid disorder    • Myocardial infarction (CMS/HCC)    • SCC (squamous cell carcinoma)     right ear   • Sleep apnea     wears CPAP   • Type 2 diabetes mellitus (CMS/HCC)     last A1C 11/2018   • Venous insufficiency           Past Surgical History:   Procedure Laterality Date   • CARDIAC ELECTROPHYSIOLOGY MAPPING AND ABLATION     • CARDIAC SURGERY     • KIDNEY STONE SURGERY     • KNEE SURGERY     • MITRAL VALVE REPLACEMENT     • TONSILLECTOMY          Patient has no known allergies.    Current Facility-Administered Medications   Medication Dose Route Frequency Provider Last Rate Last Admin   • acetaminophen (TYLENOL) tablet 650 mg  650 mg oral q4h PRN Mary Metcalf CRNP   650 mg at 06/06/21 0620   • alum-mag hydroxide-simeth (MAALOX) 200-200-20 mg/5 mL suspension 30 mL  30 mL oral q4h PRN Mary Metcalf CRNP       • aspirin enteric coated tablet 81 mg  81 mg oral Daily Mary Metcalf CRNP   81 mg at 06/06/21 0859   • bisacodyL (DULCOLAX) 10 mg suppository 10 mg  10 mg rectal Daily PRN Mary Metcalf CRNP       • cholecalciferol (vitamin D3) tablet 1,000 Units  1,000 Units oral Daily Mary Metcalf CRNP   1,000 Units at 06/06/21 0859   • glucose chewable tablet 16-32 g of dextrose  16-32 g of dextrose oral PRN Mary Metcalf CRNP        Or   • dextrose 40 % oral gel 15-30 g of dextrose  15-30 g of dextrose oral PRN Mary Metcalf CRNP        Or   • glucagon (GLUCAGEN) injection 1 mg  1 mg intramuscular PRN Mary Metcalf CRNP        Or   • dextrose in water injection 12.5 g  25 mL intravenous PRN Mary Metcalf CRNP       • dofetilide (TIKOSYN) capsule 250 mcg  250 mcg oral BID Mary Metcalf CRNP   250 mcg at 06/06/21 0859   • insulin aspart U-100 (NovoLOG) pen 6-10 Units  6-10 Units subcutaneous With meals & nightly Mary Metcalf CRNP       •  insulin detemir U-100 (LEVEMIR) pen 50 Units  50 Units subcutaneous Nightly Mary Metcalf CRNP   50 Units at 06/06/21 0003   • magnesium sulfate IVPB 1g in 100 mL NSS/D5W/SWFI  1 g intravenous PRN Mary Metcalf CRNP        Or   • magnesium sulfate IVPB 2g in 50 mL NSS/D5W/SWFI  2 g intravenous PRN Mary Metcalf CRNP       • multivitamin tablet 1 tablet  1 tablet oral Daily Mary Metcalf CRNP   1 tablet at 06/06/21 0859   • potassium chloride (KLOR-CON) tablet extended release 20 mEq  20 mEq oral PRN Mary Metcalf CRNP        Or   • potassium chloride (KLOR-CON) tablet extended release 40 mEq  40 mEq oral PRN Mary Metcalf CRNP        Or   • potassium chloride 20 mEq in 100 mL IVPB  (premix)  20 mEq intravenous PRN Mary Metcalf CRNP        Or   • potassium chloride (KCL) 40 mEq/250 mL IVPB in NSS 40 mEq  40 mEq intravenous PRN Mary Metcalf CRNP        Or   • potassium bicarbonate (EFFER-K) disintegrating tablet 20 mEq  20 mEq oral PRN Mary Metcalf CRNP        Or   • potassium bicarbonate (EFFER-K) disintegrating tablet 40 mEq  40 mEq oral PRN Mary Metcalf CRNP       • rosuvastatin (CRESTOR) tablet 20 mg  20 mg oral q PM Mary Metcalf CRNP   20 mg at 06/06/21 1723   • semaglutide pen injector 1 mg  1 mg subcutaneous Weekly Mary Metcalf CRNP   1 mg at 06/06/21 1554   • senna (SENOKOT) tablet 1 tablet  1 tablet oral 2x daily PRN Mary Metcalf CRNP       • tamsulosin (FLOMAX) 24 hr ER capsule 0.4 mg  0.4 mg oral Daily Mary Metcafl CRNP   0.4 mg at 06/06/21 0859   • warfarin (COUMADIN) tablet 5 mg  5 mg oral Daily Roverto Saez MD   5 mg at 06/06/21 1723        Social History     Socioeconomic History   • Marital status:      Spouse name: None   • Number of children: None   • Years of education: None   • Highest education level: None   Occupational History   • None   Tobacco Use   • Smoking status: Never Smoker   • Smokeless tobacco:  Never Used   Substance and Sexual Activity   • Alcohol use: Yes     Alcohol/week: 3.0 standard drinks     Types: 3 Shots of liquor per week   • Drug use: No   • Sexual activity: Defer   Other Topics Concern   • None   Social History Narrative   • None     Social Determinants of Health     Financial Resource Strain:    • Difficulty of Paying Living Expenses:    Food Insecurity: No Food Insecurity   • Worried About Running Out of Food in the Last Year: Never true   • Ran Out of Food in the Last Year: Never true   Transportation Needs:    • Lack of Transportation (Medical):    • Lack of Transportation (Non-Medical):    Physical Activity:    • Days of Exercise per Week:    • Minutes of Exercise per Session:    Stress:    • Feeling of Stress :    Social Connections:    • Frequency of Communication with Friends and Family:    • Frequency of Social Gatherings with Friends and Family:    • Attends Congregational Services:    • Active Member of Clubs or Organizations:    • Attends Club or Organization Meetings:    • Marital Status:    Intimate Partner Violence:    • Fear of Current or Ex-Partner:    • Emotionally Abused:    • Physically Abused:    • Sexually Abused:         History reviewed. No pertinent family history.    Review of Systems: Except as above:  Constitutional: Negative for fatigue and unexpected weight change.   Eyes: Negative for visual disturbance.   Respiratory: Negative for cough and shortness of breath.    Cardiovascular: Negative for chest pain and palpitations.   Gastrointestinal: Negative for abdominal pain, constipation, diarrhea, nausea and vomiting.   Genitourinary: Negative for difficulty urinating.   Musculoskeletal: Negative for arthralgias.   Skin: Negative for rash.   Neurological: Negative for dizziness and headaches.   Hematological: Does not bruise/bleed easily.   Psychiatric/Behavioral: Negative for confusion and dysphoric mood    Vital signs in last 24 hours:  Temp:  [36.3 °C (97.4 °F)-36.8 °C  (98.2 °F)] 36.7 °C (98.1 °F)  Heart Rate:  [67-74] 71  Resp:  [14-20] 18  BP: (132-156)/(58-76) 132/58    Objective     Physical Exam  Constitutional: He is alert. He appears well-developed and well-nourished. No acute distress. He is substantially obese.  He wears a long-term glucose monitor over the right abdomen.  HEENT: Normocephalic atraumatic. Conjunctivae is clear and nonicteric.  Buccal mucosa unremarkable.  Neck: No bruits.  .   Cardiovascular: Regular, normal S1 and S2, no murmurs.  Chest: Clear to auscultation bilaterally, no wheezing.  Abdomen: Bowel sounds are present.  Musculoskeletal: No abnormal angulation.  Lymphatics: No nodes visualized.    Neurological: Alert and oriented. Attention, concentration, memory, language, gross visual spatial and executive function is normal. Intact fund of knowledge.  Cranial nerves: No VF deficit to finger counting. Pupils equal round and reactive to light. Extraocular movement full with normal pursuit  and saccades. No ptosis. No nystagmus. No diplopia.    Facial movement and appearance symmetrical. Symmetric sensation.  Hearing intact to finger rub.  Tongue and palate midline. No dysarthria or dysphonia.  Symmetric shoulder shrug.   Motor exam: No drift, Strength UE 5/5 in prox and distal muscles;   LE 5/5 in prox and distal  Muscles; Normal bulk and tone.  No abnormal movements.  Symmetric fine finger foot tap and coordinated movements bilat.  Sensory examination was normal to touch, temperature and  indirectly to proprioception.  Vibration sense was reduced to the midshin on the right and to the distal shin on the left  Gait not tested.  Reflexes were 1-2 + and symmetric except for reduced response at the left post TKR v R and absent ankle jerks.. Plantar responses were flexor.   Skin: Skin is warm and dry.  He demonstrates stasis dermatitis and pitting edema over the lower legs bilaterally.    Labs:  I have reviewed the patient's pertinent labs.   Lab Results    Component Value Date    WBC 4.95 06/06/2021    HGB 14.7 06/06/2021    HCT 43.1 06/06/2021     (L) 06/06/2021    CHOL 113 06/06/2021    TRIG 245 (H) 06/06/2021    HDL 25 (L) 06/06/2021    ALT 33 06/05/2021    AST 33 06/05/2021     06/06/2021    K 4.2 06/06/2021     06/06/2021    CREATININE 0.9 06/06/2021    BUN 27 (H) 06/06/2021    CO2 22 06/06/2021    TSH 2.12 06/06/2021    INR 2.5 06/06/2021    HGBA1C 7.8 (H) 06/06/2021       Imaging:  I have personally reviewed pertinent neurological imaging   CT HEAD WITHOUT IV CONTRAST    Result Date: 6/5/2021  IMPRESSION: 1.  No evidence of hemorrhage or mass. 2.  Age indeterminant right basal ganglia and right cerebellar tiny lacunar infarcts.  If indicated MRI can be performed for further evaluation. COMMENT: Technique: Computed tomography of the brain was performed utilizing contiguous 2.5 mm transaxial sections without intravenous contrast administration. CT DOSE:  One or more dose reduction techniques (e.g. automated exposure control, adjustment of the mA and/or kV according to patient size, use of iterative reconstruction technique) utilized for this examination. Comparison studies: None. Postsurgical change: None. Brain parenchyma: There is no intraparenchymal hemorrhage, mass effect, midline shift or extra-axial fluid collection.  Tiny right basal ganglia and right cerebellar hypodensities. White matter changes: Normal for age Ventricles, cisterns, and sulci: Normal in size and configuration. Sella and cerebellar tonsils: Normal in appearance. Calvarium and extra cranial soft tissues: Normal. Paranasal sinuses: Clear bilaterally. Mastoid air cell: Normal Orbits: Normal     X-RAY CHEST 1 VIEW    Result Date: 6/5/2021  IMPRESSION: No acute cardiopulmonary process.        Assessment/Plan   * CVA (cerebral vascular accident) (CMS/[facility])  Assessment & Plan  This patient most likely suffered from a subacute infarction several days ago and from my review of  the CT, I think it more likely that the stroke in the right basal ganglia was more responsible for his imbalance.  Neither of these lesions would account for his right facial numbness which was relatively brief and probably left no residual.  MRI might be of interest with respect to whether there are other subclinical emboli but would not change the plans for treatment.  As above, I had directed my agreement with the heparin last night and it has been stopped now that he is therapeutic.  Question was raised by epic regarding VTE prophylaxis, which is clearly inappropriate in this man already anticoagulated for that purpose!  Although I suspect that the 2 most prominent small strokes were embolic particularly the one in the cerebellum, either of them might have been from small vessel disease and in a hypertensive diabetic, that would not be surprising but I do not think there is enough value in the considering the patient to be an aspirin failure to run the risk of hemorrhage generally by placing him on aspirin and Plavix with the Coumadin or switching him to Plavix alone.  If he was to experience stroke or TIA type events through fully therapeutic Coumadin, then that might be a consideration.  I discussed that with the patient and his wife and they are aware.    The patient does not seem to require physical therapy or any other particular neurological follow-up and understands that as I have closed my outpatient practice I would not be able to follow him up myself in any event but he is aware that there are others who could do so if needed.  From the standpoint of small vessel disease, I have strongly emphasized the importance of strict hypertension and diabetic management.  Note other Dx's as below.    Fortunato Arce MD  6/6/2021  7:41 PM  Patient Active Problem List   Diagnosis Code   • SANA on CPAP G47.33, Z99.89   • Type 2 diabetes mellitus, with long-term current use of insulin (CMS/[facility]) E11.9, Z79.4   •  Essential hypertension I10   • Atrial fibrillation (CMS/[facility]) I48.91   • HLD (hyperlipidemia) E78.5   • S/P total knee arthroplasty, left Z96.652   • History of heart valve replacement with bioprosthetic valve Z95.3   • CVA (cerebral vascular accident) (CMS/[facility]) I63.9   • Presence of combination internal cardiac defibrillator (ICD) and pacemaker Z95.810

## 2021-06-06 NOTE — ASSESSMENT & PLAN NOTE
-Continue home rosuvastatin  -Hold Vascepa while hospitalized given risk of bleeding on dual antiplatelet/anticoagulation  -Lipid panel in AM for further CVA risk stratification.

## 2021-06-06 NOTE — H&P
"   Hospital Medicine Service -  History & Physical        CHIEF COMPLAINT     Chief Complaint   Patient presents with   • Dizziness   • Paresthesia        HISTORY OF PRESENT ILLNESS      75 y.o. 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 Lives at home with his wife who is at the bedside. Patient reports that today he was feeling in his normal state of health when he went to the golf range. When he was getting out of his car, he reports his \"head got light\" and felt as if he was going to pass out. He held onto the car to steady himself, and then developed R sided facial numbness. He sat down and these symptoms resolved within 3 minutes. He proceeded to hit golf balls and reports he did not have any residual deficits. When he got home, he checked his INR which was 2.2 (subtherapeutic for him) so he took 5 mg of coumadin. He then proceeded to call his cardiologist who suggested he come to the ED. Patient also reports that on Wednesday, he developed sudden onset ataxia and loss of coordination, stating he \"felt like a drunken .\" He reports that this sensation came and went for a few days. He denies any other neurologic symptoms recently such as vision changes, speech difficulties, numbness/tingling.    Upon arrival to ED, patient's vitals were stable on RA. Labs revealed elevated BUN of 32 (was given 1L NSS), elevated glucose, and subtherapeutic INR of 2.1. Troponin was negative and EKG was nonischemic. CXR was unremarkable. CT head showed age indeterminate R basal ganglia and R cerebellar tiny lacunar infarcts. On call neurologist Dr. Arce reviewed CT report and he states these infarcts are \"distinctly old.\" He was given 325 mg of aspirin. Not a tPA candidate    On my assessment, patient is without complaints. He reports his light headed and facial numbness is no " longer present. Neuro check is WDL and NIH is 0. He denies headache, lightheadedness, vision changes, chest pain, palpitations, shortness of breath, abdominal pain, nausea, vomiting.     To note, patient states that his diabetes had been poorly controlled over the past few months, so he decided a few weeks ago to be more conscientious of his diet. He cut out carbs & sweets and began to a bunch of salad, containing green leafy vegetables. He checks his INR every 2 weeks and today was the first time he noted it to be subtherapeutic.    PAST MEDICAL AND SURGICAL HISTORY      Past Medical History:   Diagnosis Date   • A-fib (CMS/HCC)    • Arthritis     OA   • Bacterial endocarditis    • Deep vein thrombosis (CMS/HCC)     LLE over 30 years ago   • History of transfusion     for MV replacement x34 years ago   • Hypertension    • Kidney stones    • Lipid disorder    • Myocardial infarction (CMS/HCC)    • SCC (squamous cell carcinoma)     right ear   • Sleep apnea     wears CPAP   • Type 2 diabetes mellitus (CMS/HCC)     last A1C 11/2018   • Venous insufficiency        Past Surgical History:   Procedure Laterality Date   • CARDIAC ELECTROPHYSIOLOGY MAPPING AND ABLATION     • CARDIAC SURGERY     • KIDNEY STONE SURGERY     • KNEE SURGERY     • MITRAL VALVE REPLACEMENT     • TONSILLECTOMY         MEDICATIONS      Prior to Admission medications    Medication Sig Start Date End Date Taking? Authorizing Provider   aspirin 81 mg enteric coated tablet Take 81 mg by mouth daily.   Yes Suzette Henderson MD   carvedilol (COREG) 12.5 mg tablet Take 12.5 mg by mouth 2 (two) times a day with meals.   Yes Suzette Henderson MD   cholecalciferol, vitamin D3, (VITAMIN D3) 1,000 unit capsule Take 1,000 Units by mouth daily.   Yes Suzette Henderson MD   dofetilide (TIKOSYN) 250 mcg capsule Take 250 mcg by mouth 2 (two) times a day.   Yes Suzette Henderson MD   empagliflozin-metformin (SYNJARDY) 12.5-500 mg tablet Take 1 tablet  by mouth 2 (two) times a day.   Yes Suzette Henderson MD   FISH OIL-omega-3 fatty acids (FISH OIL) 340-1,000 mg capsule Take 1 capsule by mouth 2 (two) times a day. Hold for 2 weeks - may increase bleeding risk 4/16/19  Yes Luann Tubbs CRNP   furosemide (LASIX) 20 mg tablet Take 20 mg by mouth daily. As needed for leg swelling   Yes Suzette Henderson MD   insulin detemir U-100 (LEVEMIR) 100 unit/mL (3 mL) subcutaneous pen Inject 50 Units under the skin nightly.     Yes Suzette Henderson MD   losartan (COZAAR) 100 mg tablet Take 100 mg by mouth every evening.     Yes Suzette Henderson MD   multivitamin tablet Take by mouth daily.   Yes Suzette Henderson MD   NOT IN DATABASE Prevagen 10mg daily   Yes Suzette Henderson MD   potassium chloride (KLOR-CON) 20 mEq CR tablet Take 20 mEq by mouth daily.   Yes Suzette Henderson MD   rosuvastatin (CRESTOR) 20 mg tablet Take 20 mg by mouth every evening.     Yes Suzette Henderson MD   semaglutide (OZEMPIC) 1 mg/0.75 mL (2 mg/1.5 mL) pen injector Inject 1 mg/mL under the skin once a week. Patient takes on SUNday   Yes Suzette Henderson MD   tamsulosin (FLOMAX) 0.4 mg capsule Take 1 capsule (0.4 mg total) by mouth daily. 4/19/19 6/5/21 Yes Luann Tubbs CRNP   warfarin (COUMADIN) 2.5 mg tablet Take 2.5 mg by mouth once a week. Jared Gómez, Julianna    Yes Suzette Henderson MD   warfarin (COUMADIN) 5 mg tablet Take 5 mg by mouth every evening. Mon, Wed, Fri, Sat    Yes Suzette Henderson MD   sennosides-docusate sodium (SENNA WITH DOCUSATE SODIUM) 8.6-50 mg Take 1 tablet by mouth 2 (two) times a day. To prevent constipation associated with pain medication, hold for loose stool 4/16/19 5/16/19  Luann Tubbs CRNP   oxyCODONE (ROXICODONE) 5 mg immediate release tablet Take 1-2 tablets (5-10 mg total) by mouth every 4 (four) hours as needed for moderate pain (Pain). 4/16/19 6/5/21  Luann Tubbs CRNP       ALLERGIES      Patient has  no known allergies.    FAMILY HISTORY      History reviewed. No pertinent family history.    SOCIAL HISTORY      Lives with wife  Received 2/2 COVID vaccinations    REVIEW OF SYSTEMS        Constitutional: Negative for fatigue and unexpected weight change.   Eyes: Negative for visual disturbance. Mouth: no sore throat  Respiratory: Negative for cough and shortness of breath.    Cardiovascular: Negative for chest pain and palpitations.   Gastrointestinal: Negative for abdominal pain, constipation, diarrhea, nausea and vomiting.   Genitourinary: Negative for difficulty urinating. no hematuria no frequency no urgency no discharge  Musculoskeletal: Negative for arthralgias.   Skin: Negative for rash.   Neurological: Negative for headaches.   Hematological: Does not bruise/bleed easily.   Psychiatric/Behavioral: Negative for confusion and dysphoric mood no suicidal ideations    PHYSICAL EXAMINATION      Temp:  [36.4 °C (97.6 °F)-36.8 °C (98.3 °F)] 36.4 °C (97.6 °F)  Heart Rate:  [65-78] 72  Resp:  [16-20] 18  BP: (140-183)/(60-74) 147/60  Body mass index is 33.39 kg/m².  Physical Exam  Constitutional:       General: He is not in acute distress.     Appearance: Normal appearance. He is obese. He is not ill-appearing or toxic-appearing.   HENT:      Head: Normocephalic and atraumatic.      Nose: Nose normal.      Mouth/Throat:      Mouth: Mucous membranes are moist.      Pharynx: Oropharynx is clear.   Eyes:      General: No scleral icterus.     Extraocular Movements: Extraocular movements intact.      Pupils: Pupils are equal, round, and reactive to light.   Cardiovascular:      Rate and Rhythm: Normal rate and regular rhythm.      Heart sounds: Murmur heard.   No friction rub. No gallop.       Comments: 3/6 murmur  Pulmonary:      Effort: Pulmonary effort is normal. No respiratory distress.      Breath sounds: Normal breath sounds. No wheezing, rhonchi or rales.   Abdominal:      General: There is no distension.       Palpations: Abdomen is soft.      Tenderness: There is no abdominal tenderness. There is no guarding.      Comments: obese   Musculoskeletal:         General: Normal range of motion.      Cervical back: Normal range of motion. No rigidity or tenderness.      Comments: +1 bilateral lower extremity edema   Skin:     General: Skin is warm and dry.      Comments: Bilateral PVD discoloration   Neurological:      General: No focal deficit present.      Mental Status: He is alert and oriented to person, place, and time.      Cranial Nerves: No cranial nerve deficit.      Motor: No weakness.      Coordination: Coordination normal.      Gait: Gait normal.   Psychiatric:         Mood and Affect: Mood normal.         Behavior: Behavior normal.         Thought Content: Thought content normal.       LABS / IMAGING / EKG        Labs  CBC Results       06/05/21 04/17/19 04/02/19                    1410 0343 1040         WBC 6.06 5.56 5.17         RBC 4.78 3.76 4.89         HGB 15.4 11.9 15.6         HCT 45.3 35.9 44.8         MCV 94.8 95.5 91.6         MCH 32.2 31.6 31.9         MCHC 34.0 33.1 34.8          110 148                     CMP Results       06/05/21 04/18/19 04/17/19                    1410 0355 0343          143 138         K 4.9 5.0 4.7         Cl 106 109 108         CO2 21 24 20         Glucose 234 137 114         BUN 32 18 19         Creatinine 1.2 1.0 1.0         Calcium 9.6 9.0 8.4         Anion Gap 11 10 10         AST 33 -- --         ALT 33 -- --         Albumin 4.8 -- --         EGFR 59.3 >60.0 >60.0         Comment for K at 1410 on 06/05/21: Results obtained on plasma. Plasma Potassium values may be up to 0.4 mEQ/L less than serum values. The differences may be greater for patients with high platelet or white cell counts.          Troponin I Results       06/05/21                          1410           Troponin I 0.02                         Microbiology Results     Procedure Component Value  Units Date/Time    SARS-CoV-2 (COVID-19), PCR Nasopharynx [[id number]]  (Normal) Collected: 06/05/21 1823    Specimen: Nasopharyngeal Swab from Nasopharynx Updated: 06/05/21 1907    Narrative:      The following orders were created for panel order SARS-CoV-2 (COVID-19), PCR Nasopharynx.  Procedure                               Abnormality         Status                     ---------                               -----------         ------                     SARS-CoV-2 (COVID-19), P...[[id number]]  Normal              Final result                 Please view results for these tests on the individual orders.    SARS-CoV-2 (COVID-19), PCR Nasopharynx [[id number]]  (Normal) Collected: 06/05/21 1823    Specimen: Nasopharyngeal Swab from Nasopharynx Updated: 06/05/21 1907     SARS-CoV-2 (COVID-19) Negative    Narrative:      Nursing instructions: Obtain nasopharyngeal swab ONLY.  Send swab in viral transport media.        UA Results       04/17/19                          1637           Color Yellow           Clarity Clear           Glucose >=1000           Bilirubin Negative           Ketones Trace           Sp Grav 1.035           Blood Negative           Ph 5.5           Protein Negative           Urobilinogen 0.2           Nitrite Negative           Leuk Est Negative           WBC 0 TO 3           RBC 0 TO 4           Bacteria None Seen           Comment for Ketones at 1637 on 04/17/19:   Free sulfhydryl drugs such as Mesna, Capoten, and Acetylcysteine (Mucomyst) may cause false positive ketonuria.    Comment for Blood at 1637 on 04/17/19:   The sensitivity of the occult blood test is equivalent to approximately 4 intact RBC/HPF.    Comment for Leuk Est at 1637 on 04/17/19:   Results can be falsely negative due to high specific gravity, some antibiotics, glucose >3 g/dl, or WBC other than neutrophils.          Imaging  X-RAY CHEST 1 VIEW   Final Result   IMPRESSION: No acute cardiopulmonary process.      CT HEAD WITHOUT  IV CONTRAST   Final Result   IMPRESSION:   1.  No evidence of hemorrhage or mass.   2.  Age indeterminant right basal ganglia and right cerebellar tiny lacunar   infarcts.  If indicated MRI can be performed for further evaluation.      COMMENT:      Technique: Computed tomography of the brain was performed utilizing contiguous   2.5 mm transaxial sections without intravenous contrast administration.      CT DOSE:  One or more dose reduction techniques (e.g. automated exposure   control, adjustment of the mA and/or kV according to patient size, use of   iterative reconstruction technique) utilized for this examination.      Comparison studies: None.      Postsurgical change: None.      Brain parenchyma: There is no intraparenchymal hemorrhage, mass effect, midline   shift or extra-axial fluid collection.  Tiny right basal ganglia and right   cerebellar hypodensities.   White matter changes: Normal for age   Ventricles, cisterns, and sulci: Normal in size and configuration.   Sella and cerebellar tonsils: Normal in appearance.         Calvarium and extra cranial soft tissues: Normal.   Paranasal sinuses: Clear bilaterally.   Mastoid air cell: Normal   Orbits: Normal            ECG 12 lead    (Results Pending)   Transthoracic Echo (TTE) Complete    (Results Pending)       ECG/Telemetry  reviewed by me; SR  EKG poor data quality, reading accelerated junctional, appears SR.    ASSESSMENT AND PLAN           * CVA (cerebral vascular accident) (CMS/[facility])  Assessment & Plan  -Present w/ chief complaint of dizziness/R facial numbness x 1 day and intermittent ataxia x 4 days  -CT head on admission consistent with R basal ganglia & R cerebellar tiny lacunar infarctS  -CT reviewed by oncall neurologist Dr. Arce who suggests these infarcts are distinctly old  -Likely embolic in nature, given extensive cardiac history of PAF & mechanical valve repair.  -Given full strength ASA in ED; not a candidate for tPA    Plan:  -Admit to  tele neuro floor with neuro checks, NIH scales & official neurology evaluation  -Unable to undergo further testing with MRI given patient's status of PM/ICD  -Continue w/ home medication regimen which includes daily aspirin, anticoagulation, antiarrhythmics & statin therapy.  -TTE pending  -Cardiology consulted    History of heart valve replacement with bioprosthetic valve  Assessment & Plan  -Patient w/ history of mechanical MVR 38 years ago d/t infectious endocarditis x3 & rheumatic fever  -Follows w/ Dr. Monterroso at [facility] & Dr. Teran of [facility]  -On daily coumadin therapy with goal INR 2.7-3.1  -INR level subtherapeutic at 2.1 on admission  -Possibly source of embolic foci.    Plan:  -Continue coumadin therapy w/ daily INR levels  -Contacted on-call cardiologist Dr. Saez who recommends heparin therapy in addition to coumadin given subtherapeutic INR levels  -Will start on heparin drip WITHOUT initial bolus  -Cardiology consult.   -TTE pending.    Presence of combination internal cardiac defibrillator (ICD) and pacemaker  Assessment & Plan  -Per patient report, NOT MRI compatible  -Continue outpatient follow up with Dr. Alarcon.    HLD (hyperlipidemia)  Assessment & Plan  -Continue home rosuvastatin  -Hold Vascepa while hospitalized given risk of bleeding on dual antiplatelet/anticoagulation  -Lipid panel in AM for further CVA risk stratification.    Atrial fibrillation (CMS/[facility])  Assessment & Plan  -H/O Afib s/p ablation x3, now with PM/ICD  -Continue anticoagulation & home antiarrhythmics   -Cardiology consult pending.    Essential hypertension  Assessment & Plan  -BP normotensive  -Hold coreg & losartan x 24 hours to allow for permissive hypertension in the setting of CVA.      Type 2 diabetes mellitus, with long-term current use of insulin (CMS/[facility])  Assessment & Plan  -Diabetic diet  -ACHS accuchecks with sliding scale insulin  -Hold Synjardy oral diabetic medication in the setting of acute  hospitalization pending need for further testing.  -Continue home levemir & Ozempic (patient may use own med)    SANA on CPAP  Assessment & Plan  -OK for patient to use home CPAP      VTE Assessment: Padua VTE Score: 3  VTE Prophylaxis Plan: Heparin drip with coumadin  Code Status: Full Code-verified with patient at bedside; Has ACP doc at home.       SHAUNA Wagner  6/6/2021

## 2021-06-06 NOTE — ASSESSMENT & PLAN NOTE
-Diabetic diet  -ACHS accuchecks with sliding scale insulin  -Hold Synjardy oral diabetic medication in the setting of acute hospitalization pending need for further testing.  -Continue home levemir & Ozempic (patient may use own med)

## 2021-06-06 NOTE — PLAN OF CARE
Plan of Care Review  Plan of Care Reviewed With: patient  Progress: improving  Outcome Summary: NIH-0, INR 2.5 this AM. Pt denies any episodes of numbess and tinging, foginess or any other sx that brought him in.  Problem: Adult Inpatient Plan of Care  Goal: Plan of Care Review  Outcome: Progressing  Flowsheets (Taken 6/6/2021 3582)  Progress: improving  Plan of Care Reviewed With: patient  Outcome Summary: NIH-0, INR 2.5 this AM. Pt denies any episodes of numbess and tinging, foginess or any other sx that brought him in.

## 2021-06-06 NOTE — ASSESSMENT & PLAN NOTE
- Patient w/ history of mechanical MVR 38 years ago d/t infectious endocarditis x3 & rheumatic fever  - Follows w/ Dr. Monterroso at [facility] & Dr. Teran of [facility]  - New INR goal 3- 3.5   - Discharge home with Lovenox bridge and Coumadin 5 mg qd.   - Home INR testing   - Follow up with Dr. Teran (cardiology)

## 2021-06-07 ENCOUNTER — ANESTHESIA (INPATIENT)
Dept: CARDIOLOGY | Facility: HOSPITAL | Age: 76
DRG: 065 | End: 2021-06-07
Payer: MEDICARE

## 2021-06-07 ENCOUNTER — APPOINTMENT (INPATIENT)
Dept: CARDIOLOGY | Facility: HOSPITAL | Age: 76
DRG: 065 | End: 2021-06-07
Attending: INTERNAL MEDICINE
Payer: MEDICARE

## 2021-06-07 ENCOUNTER — ANESTHESIA EVENT (INPATIENT)
Dept: CARDIOLOGY | Facility: HOSPITAL | Age: 76
DRG: 065 | End: 2021-06-07
Payer: MEDICARE

## 2021-06-07 VITALS
RESPIRATION RATE: 16 BRPM | TEMPERATURE: 97.6 F | SYSTOLIC BLOOD PRESSURE: 155 MMHG | HEART RATE: 71 BPM | BODY MASS INDEX: 33.35 KG/M2 | DIASTOLIC BLOOD PRESSURE: 71 MMHG | HEIGHT: 72 IN | OXYGEN SATURATION: 98 % | WEIGHT: 246.2 LBS

## 2021-06-07 LAB
ATRIAL RATE: 64
BSA FOR ECHO PROCEDURE: 2.38 M2
GLUCOSE BLD-MCNC: 123 MG/DL (ref 70–99)
GLUCOSE BLD-MCNC: 136 MG/DL (ref 70–99)
INR PPP: 2.2
MV MEAN GRADIENT: 2 MMHG
MV PEAK GRADIENT: 7 MMHG
MV VTI: 24.9 CM
POCT TEST: ABNORMAL
POCT TEST: ABNORMAL
PROTHROMBIN TIME: 23.3 SEC (ref 12.2–14.5)
QRS DURATION: 118
QT INTERVAL: 424
QTC CALCULATION(BAZETT): 460
R AXIS: 60
T WAVE AXIS: 258
VENTRICULAR RATE: 71

## 2021-06-07 PROCEDURE — 93010 ELECTROCARDIOGRAM REPORT: CPT | Performed by: INTERNAL MEDICINE

## 2021-06-07 PROCEDURE — 97162 PT EVAL MOD COMPLEX 30 MIN: CPT | Mod: GP

## 2021-06-07 PROCEDURE — 93312 ECHO TRANSESOPHAGEAL: CPT

## 2021-06-07 PROCEDURE — B24BZZ4 ULTRASONOGRAPHY OF HEART WITH AORTA, TRANSESOPHAGEAL: ICD-10-PCS | Performed by: INTERNAL MEDICINE

## 2021-06-07 PROCEDURE — 99239 HOSP IP/OBS DSCHRG MGMT >30: CPT | Performed by: FAMILY MEDICINE

## 2021-06-07 PROCEDURE — 93005 ELECTROCARDIOGRAM TRACING: CPT | Performed by: FAMILY MEDICINE

## 2021-06-07 PROCEDURE — 37000001 HC ANESTHESIA GENERAL

## 2021-06-07 PROCEDURE — 25800000 HC PHARMACY IV SOLUTIONS: Performed by: NURSE ANESTHETIST, CERTIFIED REGISTERED

## 2021-06-07 PROCEDURE — 71000011 HC PACU PHASE 1 EA ADDL MIN

## 2021-06-07 PROCEDURE — 63600000 HC DRUGS/DETAIL CODE: Performed by: NURSE ANESTHETIST, CERTIFIED REGISTERED

## 2021-06-07 PROCEDURE — 36415 COLL VENOUS BLD VENIPUNCTURE: CPT | Performed by: NURSE PRACTITIONER

## 2021-06-07 PROCEDURE — 97165 OT EVAL LOW COMPLEX 30 MIN: CPT | Mod: GO

## 2021-06-07 PROCEDURE — 85610 PROTHROMBIN TIME: CPT | Performed by: NURSE PRACTITIONER

## 2021-06-07 PROCEDURE — 63700000 HC SELF-ADMINISTRABLE DRUG: Performed by: NURSE PRACTITIONER

## 2021-06-07 PROCEDURE — 71000001 HC PACU PHASE 1 INITIAL 30MIN

## 2021-06-07 RX ORDER — ENOXAPARIN SODIUM 100 MG/ML
100 INJECTION SUBCUTANEOUS
Qty: 14 ML | Refills: 0 | Status: SHIPPED | OUTPATIENT
Start: 2021-06-07 | End: 2021-06-07 | Stop reason: SDUPTHER

## 2021-06-07 RX ORDER — ENOXAPARIN SODIUM 100 MG/ML
100 INJECTION SUBCUTANEOUS
Qty: 14 ML | Refills: 0 | Status: SHIPPED | OUTPATIENT
Start: 2021-06-07 | End: 2021-06-14

## 2021-06-07 RX ORDER — WARFARIN SODIUM 5 MG/1
TABLET ORAL
Qty: 30 TABLET | Refills: 0 | Status: SHIPPED | OUTPATIENT
Start: 2021-06-07 | End: 2023-12-12

## 2021-06-07 RX ORDER — ENOXAPARIN SODIUM 150 MG/ML
1 INJECTION SUBCUTANEOUS
Status: DISCONTINUED | OUTPATIENT
Start: 2021-06-07 | End: 2021-06-07 | Stop reason: HOSPADM

## 2021-06-07 RX ORDER — SODIUM CHLORIDE 9 MG/ML
INJECTION, SOLUTION INTRAVENOUS CONTINUOUS PRN
Status: DISCONTINUED | OUTPATIENT
Start: 2021-06-07 | End: 2021-06-07 | Stop reason: SURG

## 2021-06-07 RX ORDER — PROPOFOL 10 MG/ML
INJECTION, EMULSION INTRAVENOUS AS NEEDED
Status: DISCONTINUED | OUTPATIENT
Start: 2021-06-07 | End: 2021-06-07 | Stop reason: SURG

## 2021-06-07 RX ADMIN — PROPOFOL 30 MG: 10 INJECTION, EMULSION INTRAVENOUS at 09:54

## 2021-06-07 RX ADMIN — ASPIRIN 81 MG: 81 TABLET, COATED ORAL at 11:56

## 2021-06-07 RX ADMIN — TAMSULOSIN HYDROCHLORIDE 0.4 MG: 0.4 CAPSULE ORAL at 11:55

## 2021-06-07 RX ADMIN — PROPOFOL 100 MCG/KG/MIN: 10 INJECTION, EMULSION INTRAVENOUS at 09:58

## 2021-06-07 RX ADMIN — PROPOFOL 30 MG: 10 INJECTION, EMULSION INTRAVENOUS at 09:56

## 2021-06-07 RX ADMIN — Medication 1000 UNITS: at 11:54

## 2021-06-07 RX ADMIN — DOFETILIDE 250 MCG: 250 CAPSULE ORAL at 11:55

## 2021-06-07 RX ADMIN — MULTIPLE VITAMINS W/ MINERALS TAB 1 TABLET: TAB at 11:56

## 2021-06-07 RX ADMIN — SODIUM CHLORIDE: 9 INJECTION, SOLUTION INTRAVENOUS at 09:52

## 2021-06-07 ASSESSMENT — COGNITIVE AND FUNCTIONAL STATUS - GENERAL
DRESSING REGULAR LOWER BODY CLOTHING: 4 - NONE
STANDING UP FROM CHAIR USING ARMS: 4 - NONE
CLIMB 3 TO 5 STEPS WITH RAILING: 3 - A LITTLE
WALKING IN HOSPITAL ROOM: 4 - NONE
EATING MEALS: 4 - NONE
HELP NEEDED FOR BATHING: 4 - NONE
MOVING TO AND FROM BED TO CHAIR: 4 - NONE
AFFECT: WNL
HELP NEEDED FOR PERSONAL GROOMING: 4 - NONE
DRESSING REGULAR UPPER BODY CLOTHING: 4 - NONE
TOILETING: 4 - NONE
AFFECT: WNL

## 2021-06-07 ASSESSMENT — ENCOUNTER SYMPTOMS
BLURRED VISION: 0
DOUBLE VISION: 0
PND: 0
PALPITATIONS: 0
SHORTNESS OF BREATH: 0
BACK PAIN: 0
CONSTITUTIONAL NEGATIVE: 1
WHEEZING: 0
HEMOPTYSIS: 0
NEUROLOGICAL NEGATIVE: 1
SPUTUM PRODUCTION: 0
ABDOMINAL PAIN: 0
DYSPNEA ON EXERTION: 0
SYNCOPE: 0
COUGH: 0
DYSURIA: 0

## 2021-06-07 NOTE — PROGRESS NOTES
Patient: Roberto Stiles  Location: [facility] Cardiac Cath/Electrophysiology Holding CATH LAB [facility]  MRN: [id number]  Today's date: 6/7/2021    Attempted to see patient for therapy. Unable due to patient at test or procedure (cath lab-JOSELIN).  Will continue to follow for OT

## 2021-06-07 NOTE — ANESTHESIA PREPROCEDURE EVALUATION
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 (had endocarditis x4)  Noncompliant with warfarin  Echo 12/15/2020: EF 50%,  venous insufficiency     Anesthesia ROS/MED HX      Cardiovascular   Echocardiogram reviewed and ECG reviewed       Past Surgical History:   Procedure Laterality Date   • CARDIAC ELECTROPHYSIOLOGY MAPPING AND ABLATION     • CARDIAC SURGERY     • KIDNEY STONE SURGERY     • KNEE SURGERY     • MITRAL VALVE REPLACEMENT     • TONSILLECTOMY         Physical Exam    Airway   Mallampati: III  Cardiovascular    Rhythm: irregular   Rate: normalPulmonary - normal   clear to auscultation        Anesthesia Plan    Plan: general    Technique: total IV anesthesia     Airway: natural airway / supplemental oxygen   ASA 4

## 2021-06-07 NOTE — PROGRESS NOTES
Patient: Roberto Stiles  Location: [facility] 3A 3005  MRN: [id number]  Today's date: 6/7/2021     Session ended with pt seated in recliner, alarmed with all immediate needs in reach. VSS/NAD. RN notified.       Sedrick is a 75 y.o. male admitted on 6/5/2021 with Dizziness [R42]  Ataxia [R27.0]  History of heart valve replacement with bioprosthetic valve [Z95.3]  Facial paresthesia [R20.2]  Cerebrovascular accident (CVA), unspecified mechanism (CMS/HCC) [I63.9]. Principal problem is CVA (cerebral vascular accident) (CMS/HCC).    Past Medical History  Sedrick has a past medical history of A-fib (CMS/HCC), Arthritis, Bacterial endocarditis, Deep vein thrombosis (CMS/HCC), History of transfusion, Hypertension, Kidney stones, Lipid disorder, Myocardial infarction (CMS/HCC), SCC (squamous cell carcinoma), Sleep apnea, Type 2 diabetes mellitus (CMS/HCC), and Venous insufficiency.    History of Present Illness   Roberto Stiles is a 75 y.o. male, known to Dr. Teran, who is admitted with transient acute neurologic symptoms including numbness of the face, difficulty circulating.  Apparently patient [place] the golf range the morning of admission, developed difficulty articulating and some numbness in the face, he sat in the car for several minutes it gradually subsided so he had few balls and then decided to come to the hospital.  Initial CAT scan of the head revealed age indeterminant right basal ganglia and right cerebellar tiny lacunar infarcts.  He had no previous history of stroke.  He denies any recent cardiac symptoms particular chest pain, shortness of breath, palpitations, syncope.  He has been complaining of edema and some ulceration in his legs. Patient has been noncompliant with his warfarin and home INR monitoring.      OT Vitals    Date/Time Pulse HR Source SpO2 Pt Activity O2 Therapy BP BP Location BP Method Pt Position [facility]   06/07/21 1440 71 Monitor 98 % At rest None (Room air) 155/71 Right upper arm  Automatic Sitting DMM      OT Pain    Date/Time Pain Type Rating: Rest Rating: Activity [facility]   06/07/21 1440 Pain Assessment 0 0 DMM          Prior Living Environment      Most Recent Value   Current Living Arrangements  home/apartment/condo   Living Environment Comment  Pt lives w/ wife in 3STH, 4STE, Bathroom on each floor,  tub shower and walk in shower          Prior Level of Function      Most Recent Value   Dominant Hand  right   Ambulation  independent   Transferring  independent   Toileting  independent   Bathing  independent   Dressing  independent   Eating  independent   Communication  understands/communicates without difficulty   Prior Level of Function Comment  Pt reports IND w/ ADL/IADL PTA, Ambulates IND   Assistive Device Currently Used at Home  none          Occupational Profile      Most Recent Value   Reason for Services/Referral  ADL dysfunction   Successful Occupations  IND w/ ADL/IADL PTA   Occupational History/Life Experiences  Retired,  worked of for [employer]   Performance Patterns     Environmental Supports and Barriers  Lives w/ supportive wife   Patient Goals  To go home          OT Evaluation and Treatment - 06/07/21 1428        OT Time Calculation    Start Time  1428     Stop Time  1443     Time Calculation (min)  15 min        Session Details    Document Type  initial evaluation     Mode of Treatment  occupational therapy        General Information    Patient Profile Reviewed  yes     Onset of Illness/Injury or Date of Surgery  06/05/21     Referring Physician  Denise     General Observations of Patient  Pt rec'd seated EOB; agreeable to session     Existing Precautions/Restrictions  no known precautions/restrictions        Cognition/Psychosocial    Affect/Mental Status (Cognition)  WNL     Orientation Status (Cognition)  oriented x 4     Follows Commands (Cognition)  WNL     Cognitive Function  WNL        Hearing Assessment    Hearing Status  WFL        Vision  Assessment/Intervention    Visual Impairment/Limitations  corrective lenses full-time        Sensory Assessment (Somatosensory)    Sensory Assessment (Somatosensory)  UE sensation intact        Range of Motion (ROM)    Range of Motion  ROM is WFL;bilateral upper extremities        Strength (Manual Muscle Testing)    Strength (Manual Muscle Testing)  strength is WFL;bilateral upper extremities        Bed Mobility    Comment (Bed Mobility)  NT pt rec'd seated EOB        Transfers    Transfers  toilet transfer;tub transfer        Sit to Stand Transfer    [place], Sit to Stand Transfer  independent     Assistive Device  none     Comment  From EOB        Stand to Sit Transfer    [place], Stand to Sit Transfer  independent     Assistive Device  none     Comment  To recliner        Toilet Transfer    Transfer Technique  sit-stand;stand-sit     [place], Toilet Transfer  modified independence     Assistive Device  grab bars/safety frame        Tub Transfer    Transfer Technique  step over, left entry     [place], Tub Transfer  modified independence     Assistive Device  none        Balance    Balance Assessment  sitting static balance;sitting dynamic balance;sit to stand dynamic balance;standing static balance;standing dynamic balance     Static Sitting Balance  WFL;sitting, edge of bed     Dynamic Sitting Balance  WFL;sitting, edge of bed     Sit to Stand Dynamic Balance  WFL;supported;standing     Static Standing Balance  WFL;supported;standing     Dynamic Standing Balance  WNL;supported;standing     Balance Test Results  Functional Reach Test        Functional Reach Test    Trial One: Functional Reach Test (in)  12 inches     Trial Two: Functional Reach Test (in)  12 inches     Average (in)  12 inches        Lower Body Dressing    Self-Performance  dons/doffs left sock;dons/doffs right sock     [place]  modified independence     Position  supported sitting     Adaptive Equipment  none         Grooming    Self-Performance  washes, rinses and dries hands     [place]  independent     Position  unsupported standing;sink side        Toileting    [place]  independent;perform bladder hygiene;adjust/manage clothing     Adaptive Equipment  none        Coping    Observed Emotional State  calm;cooperative     Verbalized Emotional State  acceptance        AM-PAC (TM) - ADL (Current Function)    Putting on and taking off regular lower body clothing?  4 - None     Bathing?  4 - None     Toileting?  4 - None     Putting on/taking off regular upper body clothing?  4 - None     How much help for taking care of personal grooming?  4 - None     Eating meals?  4 - None     AM-PAC (TM) ADL Score  24        Therapy Assessment/Plan (OT)    Therapy Frequency (OT)  evaluation only        Progress Summary (OT)    Daily Outcome Statement (OT)  OT IE completed ([facility] 24). Pt is IND w/ functional tx and ambulation w/o AD, and IND w/ LB self care, toileting and grooming. Pt reports some balance deficits when balance challenged, but no LOB noted t/o session. Rec d/c home when medically cleared. No furthur acute care OT needs at this time.     Symptoms Noted During/After Treatment  none        Therapy Plan Review/Discharge Plan (OT)    OT Recommended Discharge Disposition  home     Anticipated Equipment Needs At Discharge (OT)  none                  Education Documentation  Signs/Symptoms, taught by Germán Epperson, OT at 6/7/2021  3:00 PM.  Learner: Patient  Readiness: Acceptance  Method: Explanation  Response: Verbalizes Understanding  Comment: Role of OT, stroke signs and symptoms, home safety s/u, OT POC

## 2021-06-07 NOTE — PLAN OF CARE
Problem: Adult Inpatient Plan of Care  Goal: Plan of Care Review  Outcome: Progressing  Flowsheets (Taken 6/7/2021 0602)  Progress: improving  Plan of Care Reviewed With: patient  Outcome Summary: VSS, NIH 0, neuro check intact. denies any dizziness and numbess. NPO after MN for JOSELIN in am.   Plan of Care Review  Plan of Care Reviewed With: patient  Progress: improving  Outcome Summary: VSS, NIH 0, neuro check intact. denies any dizziness and numbess. NPO after MN for JOSELIN in am.

## 2021-06-07 NOTE — ANESTHESIOLOGIST PRE-PROCEDURE ATTESTATION
Pre-Procedure Patient Identification:  I am the Primary Anesthesiologist and have identified the patient on 06/07/21 at 9:28 AM.   I have confirmed the following procedure(s) * No procedures listed * will be performed by the following surgeon/proceduralist * No surgeons listed *.

## 2021-06-07 NOTE — PROGRESS NOTES
Patient: Roberto Stiles  Location: [facility] 3A 3005  MRN: [id number]  Today's date: 6/7/2021    Pt seated upright in recliner, fall bundle intact with alarm set. Call bell/phone/personal items in reach. All immediate patient needs met. RN updated, wife in room. DC acute PT    Sedrick is a 75 y.o. male admitted on 6/5/2021 with Dizziness [R42]  Ataxia [R27.0]  History of heart valve replacement with bioprosthetic valve [Z95.3]  Facial paresthesia [R20.2]  Cerebrovascular accident (CVA), unspecified mechanism (CMS/HCC) [I63.9]. Principal problem is CVA (cerebral vascular accident) (CMS/HCC).    Past Medical History  Sedrick has a past medical history of A-fib (CMS/HCC), Arthritis, Bacterial endocarditis, Deep vein thrombosis (CMS/HCC), History of transfusion, Hypertension, Kidney stones, Lipid disorder, Myocardial infarction (CMS/HCC), SCC (squamous cell carcinoma), Sleep apnea, Type 2 diabetes mellitus (CMS/HCC), and Venous insufficiency.    History of Present Illness   Roberto Stiles is a 75 y.o. male, known to Dr. Teran, who is admitted with transient acute neurologic symptoms including numbness of the face, difficulty circulating.  Apparently patient went [place] the golf range the morning of admission, developed difficulty articulating and some numbness in the face, he sat in the car for several minutes it gradually subsided so he had few balls and then decided to come to the hospital.  Initial CAT scan of the head revealed age indeterminant right basal ganglia and right cerebellar tiny lacunar infarcts.  He had no previous history of stroke.  He denies any recent cardiac symptoms particular chest pain, shortness of breath, palpitations, syncope.  He has been complaining of edema and some ulceration in his legs. Patient has been noncompliant with his warfarin and home INR monitoring.      PT Vitals    Date/Time Pulse HR Source SpO2 Pt Activity O2 Therapy BP BP Location BP Method Pt Position Observations Who    06/07/21 1402 70 Monitor 95 % At rest None (Room air) 132/70 Left forearm Automatic Sitting PT ESL   06/07/21 1425 72 Monitor 96 % At rest None (Room air) 164/78 Left forearm Automatic Sitting PT ESL      PT Pain    Date/Time Pain Type Rating: Rest Rating: Activity [facility]   06/07/21 1402 Pain Assessment 0 0 ESL          Prior Living Environment      Most Recent Value   Current Living Arrangements  home/apartment/condo   Living Environment Comment  Pt lives w/ wife in 3STH, 4STE, Bathroom on each floor,  tub shower and walk in shower          Prior Level of Function      Most Recent Value   Dominant Hand  right   Ambulation  independent   Transferring  independent   Toileting  independent   Bathing  independent   Dressing  independent   Eating  independent   Communication  understands/communicates without difficulty   Prior Level of Function Comment  Pt reports IND w/ ADL/IADL PTA, Ambulates IND   Assistive Device Currently Used at Home  none          PT Evaluation and Treatment - 06/07/21 1402        PT Time Calculation    Start Time  1402     Stop Time  1426     Time Calculation (min)  24 min        Session Details    Document Type  initial evaluation     Mode of Treatment  physical therapy        General Information    Patient Profile Reviewed  yes     Onset of Illness/Injury or Date of Surgery  06/05/21     Referring Physician  Panda     General Observations of Patient  lying in bed, agreeable to participate; wife bedside     Existing Precautions/Restrictions  fall        Cognition/Psychosocial    Affect/Mental Status (Cognition)  WNL     Orientation Status (Cognition)  oriented x 4     Follows Commands (Cognition)  WNL     Cognitive Function  WNL     Comment, Cognition  Pleasant, receptive, engaged, cooperative        Sensory    Hearing Status  WNL        Vision Assessment/Intervention    Visual Impairment/Limitations  corrective lenses full-time     Visual Motor Impairment  nystagmus, right;nystagmus, left     bilateral nystagmus beating to R and L with lateral tracking    Impact of Vision Impairment on Function  reports mild dizziness upon testing        Sensory Assessment (Somatosensory)    Sensory Assessment (Somatosensory)  sensation intact;bilateral LE        Range of Motion (ROM)    Range of Motion  ROM is WFL;bilateral lower extremities        Strength (Manual Muscle Testing)    Strength (Manual Muscle Testing)  strength is WFL;bilateral lower extremities    grossly 4+/5 BLE       Bed Mobility    [place], Supine to Sit  independent     Assistive Device  none     Comment (Bed Mobility)  flat bed, exit to L        Sit to Stand Transfer    [place], Sit to Stand Transfer  independent     Assistive Device  none     Comment  from EOB        Stand to Sit Transfer    [place], Stand to Sit Transfer  independent     Assistive Device  none     Comment  to chair        Gait Training    [place], Gait  independent;supervision     Assistive Device  none     Distance in Feet  300 feet     Pattern (Gait)  step-through     Deviations/Abnormal Patterns (Gait)  bilateral deviations     Comment (Gait/Stairs)  Pt ambulated within room/hallway without AD with minimal shoulder/elbow arm swing, good speed. Mild unsteadiness with mild deviation in path with head turns (laterally, cervical flx/ext with greatest deviation and c/o dizziness with extension).         Stairs Training    [place], Stairs  modified independence     Assistive Device  railing     Handrail Location (Stairs)  right side (ascending);left side (descending)     Number of Stairs  12     Ascending Stairs Technique  step-over-step     Descending Stairs Technique  step-over-step     Comment  Good stability, educated on use of step-to pattern for energy conservation as needed        Safety Issues, Functional Mobility    Safety Issues Affecting Function (Mobility)  safety precautions follow-through/compliance     Impairments Affecting Function  (Mobility)  balance        Balance    Balance Assessment  sitting static balance;sitting dynamic balance;sit to stand dynamic balance;standing static balance;standing dynamic balance     Static Sitting Balance  WFL;unsupported;sitting, edge of bed     Dynamic Sitting Balance  WFL;unsupported;sitting, edge of bed     Sit to Stand Dynamic Balance  WFL;unsupported     Static Standing Balance  WFL;unsupported     Dynamic Standing Balance  WFL;unsupported     Balance Test Results  Dynamic Gait Index (DGI)     Dynamic Gait Index (DGI)  17     Comment, Balance  +challenge with tandem static stance with lateral R/L LOB, +challenge with tandem walking with Min A to steady balance but no incident        Motor Skills    Motor Skills  functional endurance     Functional Endurance  good; mildly elevated BP post session        Coping    Observed Emotional State  cooperative;calm     Verbalized Emotional State  acceptance     Trust Relationship/Rapport  care explained;empathic listening provided;questions answered;questions encouraged;reassurance provided;thoughts/feelings acknowledged;emotional support provided;choices provided     Family/Support Persons  spouse     Involvement in Care  at bedside;interacting with patient;attentive to patient;participating in care;supportive of patient        AM-PAC (TM) - Mobility (Current Function)    Turning from your back to your side while in a flat bed without using bedrails?  4 - None     Moving from lying on your back to sitting on the side of a flat bed without using bedrails?  4 - None     Moving to and from a bed to a chair?  4 - None     Standing up from a chair using your arms?  4 - None     To walk in a hospital room?  4 - None     Climbing 3-5 steps with a railing?  3 - A Little     AM-PAC (TM) Mobility Score  23        Therapy Assessment/Plan (PT)    Therapy Frequency (PT)  evaluation only     Criteria for Skilled Interventions Met (PT)  yes     Problem List (PT)  balance     PT  Diagnosis (PT)  CVA (cerebellar & basal ganglia)        Progress Summary (PT)    Daily Outcome Statement (PT)  PT eval complete. Pt with +CVA of BG and cerebellum; currently with good mobility overall, independent with transfers/ambulation/stairs. Pt however challenged by dynamic balance activities such as tandem walking, head-turns and changes in speed (17/24 DGI). Recommending home with assist from wife and OP PT for gait and balance        Therapy Plan Review/Discharge Plan (PT)    PT Recommended Discharge Disposition  home with outpatient services     Anticipated Equipment Needs at Discharge (PT)  none     Therapy Plan Review (PT)  evaluation/treatment results reviewed;care plan/treatment goals reviewed;patient           Involvement in Care  Family/Support Persons: spouse  Involvement in Care: at bedside, interacting with patient, attentive to patient, participating in care, supportive of patient           Education Documentation  Activity, taught by Deisi Shook PT at 6/7/2021  3:46 PM.  Learner: Significant Other  Readiness: Acceptance  Method: Explanation  Response: Verbalizes Understanding  Comment: Role of PT in acute care, safety with mobility, DC planning, goals of PT, stroke signs and symptoms, considering dietician/nutrition consult, modfiable risk factors, considering PT rather than personal training at this time    Activity, taught by Deisi Shook PT at 6/7/2021  3:46 PM.  Learner: Patient  Readiness: Acceptance  Method: Explanation  Response: Verbalizes Understanding  Comment: Role of PT in acute care, safety with mobility, DC planning, goals of PT, stroke signs and symptoms, considering dietician/nutrition consult, modfiable risk factors, considering PT rather than personal training at this time    Physical Inactivity, taught by Deisi Shook PT at 6/7/2021  3:46 PM.  Learner: Significant Other  Readiness: Acceptance  Method: Explanation  Response: Verbalizes  Understanding  Comment: Role of PT in acute care, safety with mobility, DC planning, goals of PT, stroke signs and symptoms, considering dietician/nutrition consult, modfiable risk factors, considering PT rather than personal training at this time    Physical Inactivity, taught by Deisi Shook PT at 6/7/2021  3:46 PM.  Learner: Patient  Readiness: Acceptance  Method: Explanation  Response: Verbalizes Understanding  Comment: Role of PT in acute care, safety with mobility, DC planning, goals of PT, stroke signs and symptoms, considering dietician/nutrition consult, modfiable risk factors, considering PT rather than personal training at this time    Hypertension, taught by Diesi Shook PT at 6/7/2021  3:46 PM.  Learner: Significant Other  Readiness: Acceptance  Method: Explanation  Response: Verbalizes Understanding  Comment: Role of PT in acute care, safety with mobility, DC planning, goals of PT, stroke signs and symptoms, considering dietician/nutrition consult, modfiable risk factors, considering PT rather than personal training at this time    Hypertension, taught by Deisi Shook PT at 6/7/2021  3:46 PM.  Learner: Patient  Readiness: Acceptance  Method: Explanation  Response: Verbalizes Understanding  Comment: Role of PT in acute care, safety with mobility, DC planning, goals of PT, stroke signs and symptoms, considering dietician/nutrition consult, modfiable risk factors, considering PT rather than personal training at this time    Diabetes, taught by Deisi Shook PT at 6/7/2021  3:46 PM.  Learner: Significant Other  Readiness: Acceptance  Method: Explanation  Response: Verbalizes Understanding  Comment: Role of PT in acute care, safety with mobility, DC planning, goals of PT, stroke signs and symptoms, considering dietician/nutrition consult, modfiable risk factors, considering PT rather than personal training at this time    Diabetes, taught by Deisi Shook PT at 6/7/2021   3:46 PM.  Learner: Patient  Readiness: Acceptance  Method: Explanation  Response: Verbalizes Understanding  Comment: Role of PT in acute care, safety with mobility, DC planning, goals of PT, stroke signs and symptoms, considering dietician/nutrition consult, modfiable risk factors, considering PT rather than personal training at this time    Atrial Fibrillation, taught by Deisi Shook PT at 6/7/2021  3:46 PM.  Learner: Significant Other  Readiness: Acceptance  Method: Explanation  Response: Verbalizes Understanding  Comment: Role of PT in acute care, safety with mobility, DC planning, goals of PT, stroke signs and symptoms, considering dietician/nutrition consult, modfiable risk factors, considering PT rather than personal training at this time    Atrial Fibrillation, taught by Deisi Shook PT at 6/7/2021  3:46 PM.  Learner: Patient  Readiness: Acceptance  Method: Explanation  Response: Verbalizes Understanding  Comment: Role of PT in acute care, safety with mobility, DC planning, goals of PT, stroke signs and symptoms, considering dietician/nutrition consult, modfiable risk factors, considering PT rather than personal training at this time    Signs/Symptoms, taught by Deisi Shook PT at 6/7/2021  3:46 PM.  Learner: Significant Other  Readiness: Acceptance  Method: Explanation  Response: Verbalizes Understanding  Comment: Role of PT in acute care, safety with mobility, DC planning, goals of PT, stroke signs and symptoms, considering dietician/nutrition consult, modfiable risk factors, considering PT rather than personal training at this time    Signs/Symptoms, taught by Deisi Shook PT at 6/7/2021  3:46 PM.  Learner: Patient  Readiness: Acceptance  Method: Explanation  Response: Verbalizes Understanding  Comment: Role of PT in acute care, safety with mobility, DC planning, goals of PT, stroke signs and symptoms, considering dietician/nutrition consult, modfiable risk factors,  considering PT rather than personal training at this time    Causes, taught by Deisi Shook PT at 6/7/2021  3:46 PM.  Learner: Significant Other  Readiness: Acceptance  Method: Explanation  Response: Verbalizes Understanding  Comment: Role of PT in acute care, safety with mobility, DC planning, goals of PT, stroke signs and symptoms, considering dietician/nutrition consult, modfiable risk factors, considering PT rather than personal training at this time    Causes, taught by Deisi Shook PT at 6/7/2021  3:46 PM.  Learner: Patient  Readiness: Acceptance  Method: Explanation  Response: Verbalizes Understanding  Comment: Role of PT in acute care, safety with mobility, DC planning, goals of PT, stroke signs and symptoms, considering dietician/nutrition consult, modfiable risk factors, considering PT rather than personal training at this time

## 2021-06-07 NOTE — ED ATTESTATION NOTE
Procedures  Physical Exam  Review of Systems    6/7/202112:44 AM  I have personally seen and examined the patient.  I reviewed and agree with the PA/NP/Resident's assessment and plan of care.    My examination, assessment, and plan of care of Roberto Stiles is as follows:  The patient presents with dizziness, lightheadedness, and numbness on the right side of his face.  This is a 75-year-old gentleman with history of mitral valve replacement and paroxysmal atrial fibrillation on Coumadin in addition to hypertension, hyperlipidemia, and type 2 diabetes.  This morning he was on his way to the driving range and felt lightheaded with numbness over the right side of his face.  He sat in his car until his symptoms resolved.  The symptoms lasted a total of 3 minutes.  When the patient went home he states he checked his INR and it was 2.2 which he feels is low so he took additional 5 mg of his Coumadin.  The patient states that he has been somewhat off balance for the past 2 days.  Exam: The patient was alert in no acute distress.  HEENT was unremarkable.  His neck was supple without carotid bruits.  His heart was regular with a click from a mechanical valve.  His breath sounds were clear and equal bilaterally.  He had peripheral edema without calf tenderness.  He had no focal neurologic deficits.  Impression/Plan: The patient was evaluated with CAT scan of the brain and lab work.  The BUN was 32 with creatinine of 1.2.  CBC was normal.  INR was 2.1.  CT scan as follows:  IMPRESSION:  1.  No evidence of hemorrhage or mass.  2.  Age indeterminant right basal ganglia and right cerebellar tiny lacunar  infarcts.  If indicated MRI can be performed for further evaluation    The patient was admitted to the medical service for further evaluation and treatment.  Critical care was provided for 15 minutes.    Vital Sign Review: Vital signs have been ordered and reviewed. The oxygen saturation is 98% on room air, normal.     I was  physically present for the key/critical portions of the following procedures: None    This document was created using dragon dictation software.  There might be some typographical errors due to this technology.     Dez Briones, DO  06/07/21 0048

## 2021-06-07 NOTE — PLAN OF CARE
Problem: Adult Inpatient Plan of Care  Goal: Plan of Care Review  Outcome: Progressing  Flowsheets (Taken 6/7/2021 1500)  Plan of Care Reviewed With: patient  Outcome Summary: OT IE completed. Rec Home. No furthur acute care OT needs at this time

## 2021-06-07 NOTE — PROGRESS NOTES
Patient: Roberto Stiles  Location: [facility] Cardiac Cath/Electrophysiology Holding CATH LAB [facility]  MRN: [id number]  Today's date: 6/7/2021    Attempted to see patient for therapy. Unable due to patient at test or procedure(cath lab-JOSELIN). PT will follow and assess as able.

## 2021-06-07 NOTE — DISCHARGE SUMMARY
Hospital Medicine Service -  Inpatient Discharge Summary        BRIEF OVERVIEW   Admitting Provider: Nicho Walker MD  Attending Provider: Tripp Walker MD Attending phys phone: [phone]    PCP: Tyson Byrd [phone]    Admission Date: 6/5/2021  Discharge Date: 6/7/2021     DISCHARGE DIAGNOSES      Primary Discharge Diagnosis  CVA (cerebral vascular accident) (CMS/HCC)    Secondary Discharge Diagnoses  Active Hospital Problems    Diagnosis Date Noted   • CVA (cerebral vascular accident) (CMS/[facility]) 06/05/2021     Priority: High   • History of heart valve replacement with bioprosthetic valve 04/16/2019     Priority: High   • Presence of combination internal cardiac defibrillator (ICD) and pacemaker 06/06/2021   • Essential hypertension 04/02/2019   • SANA on CPAP 04/02/2019   • Type 2 diabetes mellitus, with long-term current use of insulin (CMS/HCC) 04/02/2019   • Atrial fibrillation (CMS/HCC) 04/02/2019      Resolved Hospital Problems   No resolved problems to display.       Problem List on Day of Discharge  History of heart valve replacement with bioprosthetic valve  Assessment & Plan  - Patient w/ history of mechanical MVR 38 years ago d/t infectious endocarditis x3 & rheumatic fever  - Follows w/ Dr. Monterroso at [facility] & Dr. Teran of [facility]  - New INR goal 3- 3.5   - Discharge home with Lovenox bridge and Coumadin 5 mg qd.   - Home INR testing   - Follow up with Dr. Teran (cardiology)        * CVA (cerebral vascular accident) (CMS/[facility])  Assessment & Plan  -Present w/ chief complaint of dizziness/R facial numbness x 1 day and intermittent ataxia x 4 days  -CT head on admission consistent with R basal ganglia & R cerebellar tiny lacunar infarcts  - New INR goal 3 - 3.5   - Seen by cardiology and neurology     Presence of combination internal cardiac defibrillator (ICD) and pacemaker  Assessment & Plan  -Per patient report, NOT MRI compatible  -Continue outpatient follow up with   Dorian.    HLD (hyperlipidemia)  Assessment & Plan  -Continue home rosuvastatin  -Hold Vascepa while hospitalized given risk of bleeding on dual antiplatelet/anticoagulation  -Lipid panel in AM for further CVA risk stratification.    Atrial fibrillation (CMS/HCC)  Assessment & Plan  -H/O Afib s/p ablation x3, now with PM/ICD  -Continue anticoagulation & home antiarrhythmics        Essential hypertension  Assessment & Plan  - BP normotensive       Type 2 diabetes mellitus, with long-term current use of insulin (CMS/HCC)  Assessment & Plan  -Diabetic diet  -ACHS accuchecks with sliding scale insulin  -Hold Synjardy oral diabetic medication in the setting of acute hospitalization pending need for further testing.  -Continue home levemir & Ozempic (patient may use own med)    SANA on CPAP  Assessment & Plan  -OK for patient to use home CPAP      SUMMARY OF HOSPITALIZATION      Presenting Problem/History of Present Illness  Dizziness [R42]  Ataxia [R27.0]  History of heart valve replacement with bioprosthetic valve [Z95.3]  Facial paresthesia [R20.2]  Cerebrovascular accident (CVA), unspecified mechanism (CMS/HCC) [I63.9]    This is a 75 y.o. year-old male admitted on 6/5/2021 with Dizziness [R42]  Ataxia [R27.0]  History of heart valve replacement with bioprosthetic valve [Z95.3]  Facial paresthesia [R20.2]  Cerebrovascular accident (CVA), unspecified mechanism (CMS/HCC) [I63.9].         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 He was seen by cardiology and neurology. His neurologic symptoms completely resolved prior to admission. He was placed on a heparin drip  bridge with coumadin. His new goal INR is 3-3.5. He will be discharged home with a Lovenox bridge. He has a home INR test. Follow up with neurology, cardiology, and PCP.     Exam on Day of Discharge  Physical Exam        General: No acute distress. Non toxic appearing.   HEENT: NC/AT PERRLA EOMI MMM  Respiratory: Clear breath sounds bilaterally. No wheezing, rhonchi, rales. Non labored breathing. No accessory muscle use  Cardiovascular: RRR. Normal S1 and S2.    Abdomen: Soft, non distended, non tender. Bowel sounds present.   Psychiatric: Calm and cooperative. Alert and oriented to person, place, time, and situation.   Neurologic: CN 2-12 grossly intact. No focal neurological deficits.   Extremities: No clubbing, cyanosis, or edema.     Consults During Admission  IP CONSULT TO CARDIOLOGY  IP CONSULT TO NEUROLOGY    DISCHARGE MEDICATIONS     PENDING ORDERS (720h ago, onward)         Ordered     Lipid panel  Morning draw,   Discontinued:  06/05/21 2203,   Status:  Canceled      Signed and Held     Basic metabolic panel  Morning draw,   Discontinued:  06/05/21 2203,   Status:  Canceled      Signed and Held               Medication List      START taking these medications    enoxaparin 100 mg/mL syringe  Commonly known as: LOVENOX  Inject 1 mL (100 mg total) under the skin every 12 (twelve) hours for 7 days. Take until INR is 3  Dose: 100 mg        CHANGE how you take these medications    warfarin 5 mg tablet  Commonly known as: COUMADIN  Take 5 mg daily. INR goal 3-3.5  What changed:   · how much to take  · how to take this  · when to take this  · additional instructions  · Another medication with the same name was removed. Continue taking this medication, and follow the directions you see here.        CONTINUE taking these medications    aspirin 81 mg enteric coated tablet  Take 81 mg by mouth daily.  Dose: 81 mg     carvediloL 12.5 mg tablet  Commonly known as: COREG  Take 12.5 mg by mouth 2 (two) times a day with  meals.  Dose: 12.5 mg     dofetilide 250 mcg capsule  Commonly known as: TIKOSYN  Take 250 mcg by mouth 2 (two) times a day.  Dose: 250 mcg     FISH OIL-omega-3 fatty acids 340-1,000 mg capsule  Commonly known as: FISH OIL  Take 1 capsule by mouth 2 (two) times a day. Hold for 2 weeks - may increase bleeding risk  Dose: 1 capsule     furosemide 20 mg tablet  Commonly known as: LASIX  Take 20 mg by mouth daily. As needed for leg swelling  Dose: 20 mg     insulin detemir U-100 100 unit/mL (3 mL) pen  Commonly known as: LEVEMIR  Inject 50 Units under the skin nightly.  Dose: 50 Units     losartan 100 mg tablet  Commonly known as: COZAAR  Take 100 mg by mouth every evening.  Dose: 100 mg     multivitamin tablet  Take by mouth daily.     OZEMPIC 1 mg/dose (2 mg/1.5 mL) pen injector  Inject 1 mg/mL under the skin once a week. Patient takes on SUNday  Dose: 1 mg/mL  Generic drug: semaglutide     potassium chloride 20 mEq CR tablet  Commonly known as: KLOR-CON  Take 20 mEq by mouth daily.  Dose: 20 mEq     rosuvastatin 20 mg tablet  Commonly known as: CRESTOR  Take 20 mg by mouth every evening.  Dose: 20 mg     SYNJARDY 12.5-500 mg tablet  Take 1 tablet by mouth 2 (two) times a day.  Dose: 1 tablet  Generic drug: empagliflozin-metformin     tamsulosin 0.4 mg capsule  Commonly known as: FLOMAX  Take 1 capsule (0.4 mg total) by mouth daily.  Dose: 0.4 mg     VITAMIN D3 25 mcg (1,000 unit) capsule  Take 1,000 Units by mouth daily.  Dose: 1,000 Units  Generic drug: cholecalciferol (vitamin D3)        STOP taking these medications    NOT IN DATABASE     sennosides-docusate sodium 8.6-50 mg  Commonly known as: SENNA WITH DOCUSATE SODIUM                    PROCEDURES / LABS / IMAGING           Pertinent Labs  CBC Results       06/06/21 06/05/21 04/17/19                    0618 1410 0343         WBC 4.95 6.06 5.56         RBC 4.59 4.78 3.76         HGB 14.7 15.4 11.9         HCT 43.1 45.3 35.9         MCV 93.9 94.8 95.5         MCH  32.0 32.2 31.6         MCHC 34.1 34.0 33.1          145 110         Comment for PLT at 0618 on 06/06/21: ALL RESULTS HAVE BEEN RECHECKED. SPECIMEN QUALITY CHECKED        CMP Results       06/06/21 06/05/21 04/18/19                    0618 1410 0355          138 143         K 4.2 4.9 5.0         Cl 107 106 109         CO2 22 21 24         Glucose 138 234 137         BUN 27 32 18         Creatinine 0.9 1.2 1.0         Calcium 9.0 9.6 9.0         Anion Gap 10 11 10         AST -- 33 --         ALT -- 33 --         Albumin -- 4.8 --         EGFR >60.0 59.3 >60.0         Comment for K at 1410 on 06/05/21: Results obtained on plasma. Plasma Potassium values may be up to 0.4 mEQ/L less than serum values. The differences may be greater for patients with high platelet or white cell counts.            SARS-CoV-2 (COVID-19) (no units)   Date/Time Value   06/05/2021 1823 Negative       Pertinent Imaging  CT HEAD WITHOUT IV CONTRAST    Result Date: 6/5/2021  IMPRESSION: 1.  No evidence of hemorrhage or mass. 2.  Age indeterminant right basal ganglia and right cerebellar tiny lacunar infarcts.  If indicated MRI can be performed for further evaluation. COMMENT: Technique: Computed tomography of the brain was performed utilizing contiguous 2.5 mm transaxial sections without intravenous contrast administration. CT DOSE:  One or more dose reduction techniques (e.g. automated exposure control, adjustment of the mA and/or kV according to patient size, use of iterative reconstruction technique) utilized for this examination. Comparison studies: None. Postsurgical change: None. Brain parenchyma: There is no intraparenchymal hemorrhage, mass effect, midline shift or extra-axial fluid collection.  Tiny right basal ganglia and right cerebellar hypodensities. White matter changes: Normal for age Ventricles, cisterns, and sulci: Normal in size and configuration. Sella and cerebellar tonsils: Normal in appearance. Calvarium and  extra cranial soft tissues: Normal. Paranasal sinuses: Clear bilaterally. Mastoid air cell: Normal Orbits: Normal     X-RAY CHEST 1 VIEW    Result Date: 6/5/2021  IMPRESSION: No acute cardiopulmonary process.      OUTPATIENT  FOLLOW-UP / REFERRALS / PENDING TESTS        Outpatient Follow-Up Appointments  Encounter Information    This patient does not currently have any appointments scheduled.         Referrals  No orders of the defined types were placed in this encounter.      Test Results Pending at Discharge  Unresulted Labs (From admission, onward)     Start     Ordered    06/06/21 0600  Protime-INR  Daily     Question:  Release to patient  Answer:  Immediate   Start Status   06/08/21 0600 Scheduled   06/09/21 0600 Scheduled   06/10/21 0600 Scheduled   06/11/21 0600 Scheduled   06/12/21 0600 Scheduled       06/06/21 0035                Important Issues to Address in Follow-Up  - Follow up with neurology   - Follow up with cardiology (Dr. Teran)   - Follow up with PCP   - Lovenox bridge until INR 3       DISCHARGE DISPOSITION      Disposition: Home     Code Status At Discharge: Full Code    Physician Order for Life-Sustaining Treatment Document Status      No documents found

## 2021-06-07 NOTE — PROGRESS NOTES
Cardiology Progress Note       Subjective     Interval History:    ROS  Review of Systems   Constitutional: Negative.   HENT: Negative.    Eyes: Negative for blurred vision and double vision.   Cardiovascular: Negative for chest pain, dyspnea on exertion, palpitations, paroxysmal nocturnal dyspnea and syncope.   Respiratory: Negative for cough, hemoptysis, shortness of breath, sputum production and wheezing.    Skin: Negative for rash.   Musculoskeletal: Negative for back pain.   Gastrointestinal: Negative for abdominal pain.   Genitourinary: Negative for dysuria.   Neurological: Negative.        Objective     Vital signs in last 24 hours  Temp:  [36.4 °C (97.6 °F)-36.8 °C (98.2 °F)] 36.4 °C (97.6 °F)  Heart Rate:  [64-72] 71  Resp:  [16-20] 16  BP: (116-173)/(58-84) 135/76      Intake/Output Summary (Last 24 hours) at 6/7/2021 0746  Last data filed at 6/6/2021 1300  Gross per 24 hour   Intake 120 ml   Output 400 ml   Net -280 ml     Intake/Output this shift:  No intake/output data recorded.    Physical Exam  Physical Exam  HENT:      Head: Normocephalic.      Mouth/Throat:      Mouth: Mucous membranes are moist.   Eyes:      Pupils: Pupils are equal, round, and reactive to light.   Cardiovascular:      Rate and Rhythm: Normal rate and regular rhythm.      Pulses: Normal pulses.      Heart sounds: Normal heart sounds.      Comments: Mechanical S1  Pulmonary:      Comments: Mechanical S1    Abdominal:      General: Abdomen is flat.   Musculoskeletal:         General: Normal range of motion.      Cervical back: Normal range of motion.   Skin:     General: Skin is warm.   Neurological:      General: No focal deficit present.      Mental Status: He is alert.         Labs  BMP Results       06/06/21 06/05/21 04/18/19                    0618 1410 0355          138 143         K 4.2 4.9 5.0         Cl 107 106 109         CO2 22 21 24         Glucose 138 234 137         BUN 27 32 18         Creatinine 0.9 1.2 1.0          Calcium 9.0 9.6 9.0         Anion Gap 10 11 10         EGFR >60.0 59.3 >60.0         Comment for K at 1410 on 06/05/21: Results obtained on plasma. Plasma Potassium values may be up to 0.4 mEQ/L less than serum values. The differences may be greater for patients with high platelet or white cell counts.        Troponin I Results       06/05/21                          1410           Troponin I 0.02                         CBC Results       06/06/21 06/05/21 04/17/19                    0618 1410 0343         WBC 4.95 6.06 5.56         RBC 4.59 4.78 3.76         HGB 14.7 15.4 11.9         HCT 43.1 45.3 35.9         MCV 93.9 94.8 95.5         MCH 32.0 32.2 31.6         MCHC 34.1 34.0 33.1          145 110         Comment for PLT at 0618 on 06/06/21: ALL RESULTS HAVE BEEN RECHECKED. SPECIMEN QUALITY CHECKED        Results from last 7 days   Lab Units 06/07/21  0516 06/06/21  0618 06/05/21  1410   INR  2.2 2.5 2.1         Current Meds  • aspirin  81 mg oral Daily   • cholecalciferol (vitamin D3)  1,000 Units oral Daily   • dofetilide  250 mcg oral BID   • insulin aspart U-100  6-10 Units subcutaneous With meals & nightly   • insulin detemir U-100  50 Units subcutaneous Nightly   • multivitamin  1 tablet oral Daily   • rosuvastatin  20 mg oral q PM   • semaglutide  1 mg subcutaneous Weekly   • tamsulosin  0.4 mg oral Daily   • warfarin  5 mg oral Daily       Imaging  CT HEAD WITHOUT IV CONTRAST    Result Date: 6/5/2021  IMPRESSION: 1.  No evidence of hemorrhage or mass. 2.  Age indeterminant right basal ganglia and right cerebellar tiny lacunar infarcts.  If indicated MRI can be performed for further evaluation. COMMENT: Technique: Computed tomography of the brain was performed utilizing contiguous 2.5 mm transaxial sections without intravenous contrast administration. CT DOSE:  One or more dose reduction techniques (e.g. automated exposure control, adjustment of the mA and/or kV according to patient size, use  of iterative reconstruction technique) utilized for this examination. Comparison studies: None. Postsurgical change: None. Brain parenchyma: There is no intraparenchymal hemorrhage, mass effect, midline shift or extra-axial fluid collection.  Tiny right basal ganglia and right cerebellar hypodensities. White matter changes: Normal for age Ventricles, cisterns, and sulci: Normal in size and configuration. Sella and cerebellar tonsils: Normal in appearance. Calvarium and extra cranial soft tissues: Normal. Paranasal sinuses: Clear bilaterally. Mastoid air cell: Normal Orbits: Normal     X-RAY CHEST 1 VIEW    Result Date: 6/5/2021  IMPRESSION: No acute cardiopulmonary process.            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

## 2021-06-07 NOTE — NURSING NOTE
D/c plan and instructions reviewed with patient and wife at bedside. All questions answered; personal belongings returned.

## 2021-06-07 NOTE — PLAN OF CARE
Problem: Adult Inpatient Plan of Care  Goal: Readiness for Transition of Care  Intervention: Mutually Develop Transition Plan  Flowsheets  Taken 6/7/2021 1507 by Jaky Lake RN  Anticipated Discharge Disposition: other (see comments)  Transportation Concerns: car, none  Readmission Within the Last 30 Days: no previous admission in last 30 days  Patient/Family Anticipated Services at Transition: outpatient care  Patient/Family Anticipates Transition to: home with family  Taken 6/7/2021 1428 by Germán Epperson OT  Assistive Device/Animal Currently Used at Home: none   Met with pt and his wife.  Independent, active- avid golfer.  No AD used  Uses cpap, manages all own meds.  Plan is home on new Lovenox to bridge to [facility].  [facility], co pay $14.38, they do not have in stock but [facility] [address] [facility], script was transferred.  Pt aware, address provided to wife. Pt has given Lovenox in the past to himself and is very comfortable with injection. Nurse updated, she will review with im prior to discharge.  Per PT eval would benefit from out pt PT- provided with brochure, requested script from [facility]

## 2021-06-07 NOTE — PLAN OF CARE
Problem: Adult Inpatient Plan of Care  Goal: Plan of Care Review  Outcome: Progressing  Flowsheets (Taken 6/7/2021 1427)  Progress: no change  Plan of Care Reviewed With: patient  Outcome Summary:   PT eval complete. DC acute PT needs   recommending OP PT for gait and balance     Problem: Adult Inpatient Plan of Care  Goal: Patient-Specific Goal (Individualized)  Outcome: Progressing  Flowsheets (Taken 6/7/2021 1424)  Patient-Specific Goals (Include Timeframe): go home today

## 2021-06-07 NOTE — POST-PROCEDURE NOTE
Pt recovered to baseline. Pt with stable VS and no c/o pain. Pt seen by dr robison post procedure. Pt verbalizes understanding of post procedure instructions. Pt cleared for transfer to the floor per anesthesia and dr robison.

## 2021-06-07 NOTE — HOSPITAL COURSE
Sedrick is a 75 y.o. male admitted on 6/5/2021 with Dizziness [R42]  Ataxia [R27.0]  History of heart valve replacement with bioprosthetic valve [Z95.3]  Facial paresthesia [R20.2]  Cerebrovascular accident (CVA), unspecified mechanism (CMS/HCC) [I63.9]. Principal problem is CVA (cerebral vascular accident) (CMS/HCC).    Past Medical History  Sedrick has a past medical history of A-fib (CMS/HCC), Arthritis, Bacterial endocarditis, Deep vein thrombosis (CMS/HCC), History of transfusion, Hypertension, Kidney stones, Lipid disorder, Myocardial infarction (CMS/HCC), SCC (squamous cell carcinoma), Sleep apnea, Type 2 diabetes mellitus (CMS/HCC), and Venous insufficiency.    History of Present Illness   Roberto Stiles is a 75 y.o. male, known to Dr. Teran, who is admitted with transient acute neurologic symptoms including numbness of the face, difficulty circulating.  Apparently patient went to the golf range the morning of admission, developed difficulty articulating and some numbness in the face, he sat in the car for several minutes it gradually subsided so he had few balls and then decided to come to the hospital.  Initial CAT scan of the head revealed age indeterminant right basal ganglia and right cerebellar tiny lacunar infarcts.  He had no previous history of stroke.  He denies any recent cardiac symptoms particular chest pain, shortness of breath, palpitations, syncope.  He has been complaining of edema and some ulceration in his legs. Patient has been noncompliant with his warfarin and home INR monitoring.

## 2021-06-07 NOTE — DISCHARGE INSTRUCTIONS
Due to your recent medical condition(s) it is highly suggested that you have a sleep study performed after you return home from the hospital.  Please consult with your primary care provider (PCP) and discuss if a sleep study is right for you.

## 2021-06-07 NOTE — ANESTHESIA POSTPROCEDURE EVALUATION
Patient: Roberto Stiles    Procedure Summary     Date: 06/07/21 Room / Location: [facility] Cardiology; [facility] Cardiac Cath/Electrophysiology Holding    Anesthesia Start: 0952 Anesthesia Stop: 1026    Procedure: TRANSESOPHAGEAL ECHO (JOSELIN) Diagnosis: Cerebrovascular accident (CVA) due to embolism of posterior cerebral artery, unspecified blood vessel laterality (CMS/HCC)    Scheduled Providers: Layo Humphreys MD Responsible Provider: Jessi Castaneda MD    Anesthesia Type: general ASA Status: 4          Anesthesia Type: general  PACU Vitals    No data found in the last 10 encounters.           Anesthesia Post Evaluation    Pain management: adequate  Patient participation: complete - patient participated  Level of consciousness: awake and alert  Cardiovascular status: acceptable  Airway Patency: adequate  Respiratory status: acceptable  Hydration status: acceptable  Anesthetic complications: no

## 2021-07-09 NOTE — PLAN OF CARE
Problem: Patient Care Overview  Goal: Plan of Care Review  Outcome: Ongoing (interventions implemented as appropriate)   04/17/19 0502   Coping/Psychosocial   Plan Of Care Reviewed With patient   Plan of Care Review   Progress improving   Outcome Summary Pt OOB assist x 1. Pt voided 200 cc and then 125 cc but then bladdder scanned for 857 cc. Pt was  straight cath x 1 overnight for 950 cc. DTV at 1300. 1 oxycodone admin for pain control at 2030. Pt prefers to be discharged home today.    04/17/19 0502   Coping/Psychosocial   Plan Of Care Reviewed With patient   Plan of Care Review   Progress improving   Outcome Summary Pt OOB assist x 1. Pt voided 200 cc and then 125 cc but then bladdder scanned for 857 cc. Pt was to be straaight cath x 1 overnight.          Problem: Knee Arthroplasty (Total, Partial) (Adult)  Goal: Signs and Symptoms of Listed Potential Problems Will be Absent, Minimized or Managed (Knee Arthroplasty)  Outcome: Ongoing (interventions implemented as appropriate)         (1) 41-60 years

## 2022-05-02 ENCOUNTER — HOSPITAL ENCOUNTER (EMERGENCY)
Facility: HOSPITAL | Age: 77
Discharge: HOME | End: 2022-05-02
Attending: EMERGENCY MEDICINE
Payer: MEDICARE

## 2022-05-02 ENCOUNTER — APPOINTMENT (EMERGENCY)
Dept: RADIOLOGY | Facility: HOSPITAL | Age: 77
End: 2022-05-02
Payer: MEDICARE

## 2022-05-02 VITALS
HEART RATE: 74 BPM | TEMPERATURE: 97.3 F | RESPIRATION RATE: 18 BRPM | SYSTOLIC BLOOD PRESSURE: 111 MMHG | DIASTOLIC BLOOD PRESSURE: 56 MMHG | OXYGEN SATURATION: 95 %
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
## 2022-05-02 DIAGNOSIS — S60.211A CONTUSION OF RIGHT WRIST, INITIAL ENCOUNTER: ICD-10-CM

## 2022-05-02 DIAGNOSIS — S30.0XXA TRAUMATIC HEMATOMA OF BUTTOCK, INITIAL ENCOUNTER: ICD-10-CM

## 2022-05-02 LAB
INR PPP: 2.9
PROTHROMBIN TIME: 28.4 SEC (ref 12.2–14.5)

## 2022-05-02 PROCEDURE — 72170 X-RAY EXAM OF PELVIS: CPT

## 2022-05-02 PROCEDURE — 99283 EMERGENCY DEPT VISIT LOW MDM: CPT | Mod: 25

## 2022-05-02 PROCEDURE — 73110 X-RAY EXAM OF WRIST: CPT | Mod: RT

## 2022-05-02 PROCEDURE — 36415 COLL VENOUS BLD VENIPUNCTURE: CPT | Performed by: PHYSICIAN ASSISTANT

## 2022-05-02 PROCEDURE — 63700000 HC SELF-ADMINISTRABLE DRUG: Performed by: PHYSICIAN ASSISTANT

## 2022-05-02 PROCEDURE — 72220 X-RAY EXAM SACRUM TAILBONE: CPT

## 2022-05-02 PROCEDURE — 85610 PROTHROMBIN TIME: CPT | Performed by: PHYSICIAN ASSISTANT

## 2022-05-02 RX ORDER — ACETAMINOPHEN 325 MG/1
650 TABLET ORAL ONCE
Status: COMPLETED | OUTPATIENT
Start: 2022-05-02 | End: 2022-05-02

## 2022-05-02 RX ADMIN — ACETAMINOPHEN 650 MG: 325 TABLET ORAL at 14:49

## 2022-05-02 NOTE — DISCHARGE INSTRUCTIONS
Rest.  Use ice.  Drink plenty of fluids.  Follow-up with your family doctor in 2 days.  Call for appointment.  Return to the emergency department if new or worsening symptoms develop.    Your INR today was 2.9. Continue taking medications as prescribed.

## 2022-05-02 NOTE — ED PROVIDER NOTES
Emergency Medicine Note  HPI   HISTORY OF PRESENT ILLNESS     HPI     75 yo male with h/o afib, CAD, HTN, hyperlipidemia, DM II presents with right buttock and right wrist pain s/p fall x6 days.  Patient states he sat on a chair with wheels on Tuesday and the chair rolled out from under him.  Patient fell to his buttocks and right outstretched arm.  Patient with continued pain and swelling in right buttock with increasing ecchymosis.  He also has continued pain in his right wrist.  Patient is on Coumadin for mitral valve repair 40 years ago with target INR around 3.2 per Dr. Teran.  INR 3.5 last week. Denies head injury or any other injuries.      Patient History   PAST HISTORY     Reviewed from Nursing Triage:         Past Medical History:   Diagnosis Date   • A-fib (CMS/HCC)    • Arthritis     OA   • Bacterial endocarditis    • Deep vein thrombosis (CMS/HCC)     LLE over 30 years ago   • History of transfusion     for MV replacement x34 years ago   • Hypertension    • Kidney stones    • Lipid disorder    • Myocardial infarction (CMS/HCC)    • SCC (squamous cell carcinoma)     right ear   • Sleep apnea     wears CPAP   • Type 2 diabetes mellitus (CMS/HCC)     last A1C 11/2018   • Venous insufficiency        Past Surgical History:   Procedure Laterality Date   • CARDIAC ELECTROPHYSIOLOGY MAPPING AND ABLATION     • CARDIAC SURGERY     • KIDNEY STONE SURGERY     • KNEE SURGERY     • MITRAL VALVE REPLACEMENT     • TONSILLECTOMY         History reviewed. No pertinent family history.    Social History     Tobacco Use   • Smoking status: Never Smoker   • Smokeless tobacco: Never Used   Substance Use Topics   • Alcohol use: Yes     Alcohol/week: 3.0 standard drinks     Types: 3 Shots of liquor per week   • Drug use: No         Review of Systems   REVIEW OF SYSTEMS     Review of Systems   Constitutional: Negative for fever.   HENT: Negative for congestion.    Eyes: Negative for visual disturbance.   Respiratory: Negative  for shortness of breath.    Cardiovascular: Negative for chest pain.   Gastrointestinal: Negative for vomiting.   Musculoskeletal: Positive for arthralgias (right wrist). Negative for back pain.   Skin: Negative for wound.   Neurological: Negative for weakness and numbness.   Psychiatric/Behavioral: Negative for confusion.         VITALS     ED Vitals    Date/Time Temp Pulse Resp BP SpO2 [facility]   05/02/22 1513 -- 74 18 111/56 95 % BAM   05/02/22 1329 36.3 °C (97.3 °F) 79 18 132/83 95 % OMB                       Physical Exam   PHYSICAL EXAM     Physical Exam  Vitals and nursing note reviewed.   Constitutional:       Appearance: He is well-developed.   HENT:      Head: Normocephalic and atraumatic.      Mouth/Throat:      Mouth: Mucous membranes are moist.   Eyes:      Extraocular Movements: Extraocular movements intact.   Pulmonary:      Effort: Pulmonary effort is normal.   Musculoskeletal:      Cervical back: Normal range of motion and neck supple.      Right hip: Normal range of motion.      Comments: Large hematoma to right buttocks with surrouding ecchymosis and tenderness. NV intact distally   Skin:     General: Skin is warm and dry.   Neurological:      General: No focal deficit present.      Mental Status: He is alert.   Psychiatric:         Mood and Affect: Mood normal.           PROCEDURES     Procedures     DATA     Results     Procedure Component Value Units Date/Time    Protime-INR [[id number]]  (Abnormal) Collected: 05/02/22 1445    Specimen: Blood, Venous Updated: 05/02/22 1507     PT 28.4 sec      INR 2.9     Comment: Moderate Intensity Anticoagulation = 2.0 to 3.0, High Intensity = 2.5 to 3.5             Imaging Results          X-RAY PELVIS 1 OR 2 VIEWS (Final result)  Result time 05/02/22 15:31:07    Final result                 Impression:    IMPRESSION: Degenerative change without evidence for acute osseous abnormality.             Narrative:    CLINICAL HISTORY: Fall 10 days ago.  COMMENT:  3 views  of the sacrum and coccyx with AP view of the pelvis  demonstrate degenerative change about the hip joints with joint space narrowing  and subchondral sclerosis. Marginal osteophytes about the femoral heads, left  greater than right. Degenerative change involving the SI joints. The pubic rami  appear intact.  Degenerative change in the lower lumbar spine with disc space narrowing L5-S1.  There are marginal osteophytes at the L5 vertebra. Degenerative change involving  the posterior elements. The sacral margins appear grossly intact. The coccygeal  segments are grossly unremarkable.                             X-RAY SACRUM AND COCCYX (Final result)  Result time 05/02/22 15:31:07    Final result                 Impression:    IMPRESSION: Degenerative change without evidence for acute osseous abnormality.             Narrative:    CLINICAL HISTORY: Fall 10 days ago.  COMMENT:  3 views of the sacrum and coccyx with AP view of the pelvis  demonstrate degenerative change about the hip joints with joint space narrowing  and subchondral sclerosis. Marginal osteophytes about the femoral heads, left  greater than right. Degenerative change involving the SI joints. The pubic rami  appear intact.  Degenerative change in the lower lumbar spine with disc space narrowing L5-S1.  There are marginal osteophytes at the L5 vertebra. Degenerative change involving  the posterior elements. The sacral margins appear grossly intact. The coccygeal  segments are grossly unremarkable.                             X-RAY WRIST RIGHT 3+ VIEWS (Final result)  Result time 05/02/22 15:29:29    Final result                 Impression:    IMPRESSION: Degenerative change without evidence for acute osseous abnormality.             Narrative:    CLINICAL HISTORY: Fall 2 days ago. Complaining of right wrist pain  COMMENT:  4 views of the right wrist were obtained and demonstrate degenerative  change at the radioulnar joint. There is no evidence for acute  fracture or  subluxation. Degenerative change at the first carpometacarpal joint. Vascular  calcification is noted.                              No orders to display       Scoring tools                                 ED Course & MDM   MDM / ED COURSE / CLINICAL IMPRESSIONS / DISPO     MDM    Clinical Impressions as of 05/04/22 1029   Fall, initial encounter   Traumatic hematoma of buttock, initial encounter   Contusion of right wrist, initial encounter     Discharge         Shanell Leonardo PA C  05/04/22 1027

## 2022-05-04 ASSESSMENT — ENCOUNTER SYMPTOMS
SHORTNESS OF BREATH: 0
NUMBNESS: 0
WOUND: 0
VOMITING: 0
ARTHRALGIAS: 1
WEAKNESS: 0
CONFUSION: 0
BACK PAIN: 0
FEVER: 0

## 2022-05-05 NOTE — ED ATTESTATION NOTE
I reviewed and agree with physician assistant / nurse practitioner’s assessment and plan of care.  We discussed the treatment plan for the patient.  I was immediately available for any questions regarding the care of the patient.     Alla Fleming MD  05/05/22 9590

## 2022-09-07 ENCOUNTER — TRANSCRIBE ORDERS (OUTPATIENT)
Dept: SCHEDULING | Age: 77
End: 2022-09-07

## 2022-09-07 DIAGNOSIS — Z01.812 ENCOUNTER FOR PREPROCEDURAL LABORATORY EXAMINATION: Primary | ICD-10-CM

## 2022-09-16 ENCOUNTER — APPOINTMENT (OUTPATIENT)
Dept: LAB | Facility: HOSPITAL | Age: 77
End: 2022-09-16
Attending: INTERNAL MEDICINE
Payer: MEDICARE

## 2022-09-16 ENCOUNTER — TRANSCRIBE ORDERS (OUTPATIENT)
Dept: REGISTRATION | Facility: HOSPITAL | Age: 77
End: 2022-09-16

## 2022-09-16 ENCOUNTER — APPOINTMENT (OUTPATIENT)
Dept: LAB | Facility: HOSPITAL | Age: 77
End: 2022-09-16
Attending: SPECIALIST
Payer: MEDICARE

## 2022-09-16 DIAGNOSIS — I48.0 PAROXYSMAL ATRIAL FIBRILLATION (CMS/HCC): ICD-10-CM

## 2022-09-16 DIAGNOSIS — I48.0 PAROXYSMAL ATRIAL FIBRILLATION (CMS/HCC): Primary | ICD-10-CM

## 2022-09-16 DIAGNOSIS — Z01.812 ENCOUNTER FOR PREPROCEDURAL LABORATORY EXAMINATION: ICD-10-CM

## 2022-09-16 LAB
ALBUMIN SERPL-MCNC: 4.4 G/DL (ref 3.4–5)
ALP SERPL-CCNC: 87 IU/L (ref 35–126)
ALT SERPL-CCNC: 30 IU/L (ref 16–63)
ANION GAP SERPL CALC-SCNC: 8 MEQ/L (ref 3–15)
APTT PPP: 36 SEC (ref 23–35)
AST SERPL-CCNC: 26 IU/L (ref 15–41)
BASOPHILS # BLD: 0.03 K/UL (ref 0.01–0.1)
BASOPHILS NFR BLD: 0.6 %
BILIRUB SERPL-MCNC: 0.3 MG/DL (ref 0.3–1.2)
BUN SERPL-MCNC: 20 MG/DL (ref 8–20)
CALCIUM SERPL-MCNC: 9.4 MG/DL (ref 8.9–10.3)
CHLORIDE SERPL-SCNC: 104 MEQ/L (ref 98–109)
CO2 SERPL-SCNC: 24 MEQ/L (ref 22–32)
CREAT SERPL-MCNC: 1.1 MG/DL (ref 0.8–1.3)
DIFFERENTIAL METHOD BLD: ABNORMAL
EOSINOPHIL # BLD: 0.15 K/UL (ref 0.04–0.54)
EOSINOPHIL NFR BLD: 3.2 %
ERYTHROCYTE [DISTWIDTH] IN BLOOD BY AUTOMATED COUNT: 13 % (ref 11.6–14.4)
GFR SERPL CREATININE-BSD FRML MDRD: >60 ML/MIN/1.73M*2
GLUCOSE SERPL-MCNC: 291 MG/DL (ref 70–99)
HCT VFR BLDCO AUTO: 42.3 % (ref 40.1–51)
HGB BLD-MCNC: 14.5 G/DL (ref 13.7–17.5)
IMM GRANULOCYTES # BLD AUTO: 0.04 K/UL (ref 0–0.08)
IMM GRANULOCYTES NFR BLD AUTO: 0.8 %
INR PPP: 2.5
LYMPHOCYTES # BLD: 0.81 K/UL (ref 1.2–3.5)
LYMPHOCYTES NFR BLD: 17.1 %
MAGNESIUM SERPL-MCNC: 2.3 MG/DL (ref 1.8–2.5)
MCH RBC QN AUTO: 31.9 PG (ref 28–33.2)
MCHC RBC AUTO-ENTMCNC: 34.3 G/DL (ref 32.2–36.5)
MCV RBC AUTO: 93 FL (ref 83–98)
MONOCYTES # BLD: 0.35 K/UL (ref 0.3–1)
MONOCYTES NFR BLD: 7.4 %
NEUTROPHILS # BLD: 3.36 K/UL (ref 1.7–7)
NEUTS SEG NFR BLD: 70.9 %
NRBC BLD-RTO: 0 %
PDW BLD AUTO: 10.3 FL (ref 9.4–12.4)
PLATELET # BLD AUTO: 142 K/UL (ref 150–350)
POTASSIUM SERPL-SCNC: 4.9 MEQ/L (ref 3.6–5.1)
PROT SERPL-MCNC: 6.6 G/DL (ref 6–8.2)
PROTHROMBIN TIME: 26.1 SEC (ref 12.2–14.5)
RBC # BLD AUTO: 4.55 M/UL (ref 4.5–5.8)
SARS-COV-2 RNA RESP QL NAA+PROBE: NEGATIVE
SODIUM SERPL-SCNC: 136 MEQ/L (ref 136–144)
WBC # BLD AUTO: 4.74 K/UL (ref 3.8–10.5)

## 2022-09-16 PROCEDURE — U0003 INFECTIOUS AGENT DETECTION BY NUCLEIC ACID (DNA OR RNA); SEVERE ACUTE RESPIRATORY SYNDROME CORONAVIRUS 2 (SARS-COV-2) (CORONAVIRUS DISEASE [COVID-19]), AMPLIFIED PROBE TECHNIQUE, MAKING USE OF HIGH THROUGHPUT TECHNOLOGIES AS DESCRIBED BY CMS-2020-01-R: HCPCS

## 2022-09-16 PROCEDURE — 85025 COMPLETE CBC W/AUTO DIFF WBC: CPT

## 2022-09-16 PROCEDURE — 85730 THROMBOPLASTIN TIME PARTIAL: CPT

## 2022-09-16 PROCEDURE — 85610 PROTHROMBIN TIME: CPT

## 2022-09-16 PROCEDURE — C9803 HOPD COVID-19 SPEC COLLECT: HCPCS

## 2022-09-16 PROCEDURE — 36415 COLL VENOUS BLD VENIPUNCTURE: CPT

## 2022-09-16 PROCEDURE — 80053 COMPREHEN METABOLIC PANEL: CPT

## 2022-09-16 PROCEDURE — 83735 ASSAY OF MAGNESIUM: CPT

## 2022-09-19 NOTE — H&P
Electrophysiology History and Physical    Date of Service: 9/20/22    HPI: Mr. Stiles is a pleasant 77 y.o. 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   He also has a history of phantom shocks.  His device is in a DDIR mode to avoid RV pacing (was as high as 50%).  He did not feel well with this so it was changed back to DDDR. He also has chronically elevated RV thresholds.  The plan at generator change, is thus is to upgrade to biventricular ICD.  The patient was last seen in the office by Dr. Alarcon on 6/24/22 in routine follow-up.  At that time, the patient was doing well.  The battery was nearing EOL at that time.  Since the patient is symptomatic with RV pacing, an upgrade to a Bi-Ventricular system was recommended at the time of battery replacement.  An echocardiogram was performed which notes a preserved EF.  Since being seen, the patient admits to feeling well.  Denies recent illness, fever, dizziness, chest pain, palpitations, SOB, MCALLISTER, nausea, vomiting, edema, myalgia, bleeding, and neuropathy.    Thus, today Mr. Stiles presents to [facility]'s Electrophysiology lab for an elective [employer] up-grade to Bi-Ventricular ICD.  He is amendable to the procedure.  Please see the plan for further relevancy to today's procedure.    Review of Systems:  Pertinent items are noted in HPI.  A comprehensive review of systems was negative except for that stated in the HPI.      Physical Exam:  Visit Vitals  BP (!) 151/67    Pulse 66   Temp 36.6 °C (97.8 °F) (Temporal)   Resp 18   Ht 1.829 m (6')   Wt 109 kg (240 lb)   SpO2 97%   BMI 32.55 kg/m²       Physical Exam  Vitals reviewed.   Constitutional:       Appearance: Normal appearance.   HENT:      Head: Normocephalic and atraumatic.      Right Ear: External ear normal.      Left Ear: External ear normal.      Nose: Nose normal.      Mouth/Throat:      Mouth: Mucous membranes are moist.      Pharynx: Oropharynx is clear.   Eyes:      General: No scleral icterus.     Extraocular Movements: Extraocular movements intact.      Conjunctiva/sclera: Conjunctivae normal.      Pupils: Pupils are equal, round, and reactive to light.   Cardiovascular:      Rate and Rhythm: Normal rate and regular rhythm.      Pulses: Normal pulses.      Heart sounds: Normal heart sounds.      Comments: Left chest incision well healed and device well seated. Sinus Rhythm. AV synchrony noted.  Atrial sensing and ventricular sensing on telemetry.      Click noted.  Pulmonary:      Effort: Pulmonary effort is normal.      Breath sounds: Normal breath sounds.   Abdominal:      General: Bowel sounds are normal.      Palpations: Abdomen is soft.   Musculoskeletal:         General: Normal range of motion.      Cervical back: Normal range of motion and neck supple.      Right lower leg: No edema.      Left lower leg: No edema.   Lymphadenopathy:      Cervical: No cervical adenopathy.   Skin:     General: Skin is warm and dry.   Neurological:      General: No focal deficit present.      Mental Status: He is alert and oriented to person, place, and time. Mental status is at baseline.   Psychiatric:         Mood and Affect: Mood normal.         Behavior: Behavior normal.         Thought Content: Thought content normal.         Judgment: Judgment normal.         PAST MEDICAL HISTORY:  Raine Shiley mitral valve replacement at age 36: May 14 1982  Paroxysmal A. fib  Parox Atrial Flutter- cardioverted 6/3/14  Right A. flutter  ablation August 2014  Left A. flutter ablation August 2014  Sustained VT, secondary to Rythmol 2/06-VT ablation January 2019  Rheumatic fever at age 9  Sleep apnea  Coronary disease, embolic infarct to the LAD due to endocarditis.  ICD-[employer]  History of phantom shocks  Small CVA cerebellar and basal ganglia    AAA negative Sep 2014  Carotid ultrasound.  July 2019-mild bilateral disease  Echo march 2013. 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 EF 50%, normal mechanical mitral valve function no LA clot  Cardioversion 6/1/15  A flutter ablation, A. fib ablation 11/05/2015 [facility]  Echo 11/06/2015 EF 45% mechanical AVR function normal  2-D echo 05/02/2017. EF 50%. LV apical aneurysm. Normal mechanical mitral valve. Mild TR, normal RV function, normal PA pressure, aortic valve sclerosis  Echo 12/15/2020: EF 50%, LV apical aneurysm, or other walls contract normally., Normal mechanical mitral valve, no significant MR, mild TR, normal RV function, normal PA pressure, aortic valve sclerosis  PET scan stress test 11/16/18: Fixed apical infarct no ischemia  Bert 06/07/2021:  Normal functioning mechanical MVR.  Aortic valve sclerosis    PAST SURGICAL HISTORY:  Raine Shiley mitral valve replacement age 36, 05/14/1982  ICD [employer]  A flutter ablation in August 2014  A flutter and A. fib ablation November 2015  Vein ablation May 2017  VT ablation January 2019  knee    L: CEAP Classification:  C3, C4, C5    R: CEAP Classification:  C3, C4, C5    L Pain: 0 Inflammation: 1 Induration: 2 Edema: 3 Varicose Veins: 1 Ulceration:  1  Compression Stockings: 0 Discoloration: 3    R Pain: 0 Inflammation: 1 Induration: 2 Edema: 3 Varicose Veins: 1 Ulceration:  1  Compression Stockings: 0 Discoloration: 3    SOCIAL HISTORY:    Never smoker   Never user of smokeless tobacco   Currently drinks alcohol socially.    FAMILY  HISTORY:   Father:  No history of early CAD, HTN, or TIA/CVA   Mother:  No history of HTN, heart disease, or TIA/CVA    ALLERGIES:   No Known Drug Allergies    HOME MEDICATIONS:  warfarin 5 mg tablet (warfarin) 1 tablet by mouth once a day as directed   dofetilide 250 mcg capsule (dofetilide) Take 1 capsule by mouth twice a day   amoxicillin 500 mg capsule (amoxicillin) Take 4 capsule by mouth single dose   Lovaza 1 gram capsule (omega-3 acid ethyl esters) Take 1 capsule by mouth twice a day   Vascepa 1 gram capsule (icosapent ethyl) Take 1 capsule by mouth twice a day   furosemide 20 mg tablet (furosemide) Take 1 tablet by mouth once a day   Synjardy 12.5-500 mg tablet (empagliflozin-metformin) Take 1 tablet by mouth twice a day   rosuvastatin 20 mg tablet (rosuvastatin) Take 1 tablet by mouth once a day   LEVEMIR FLEXPEN SOLUTION PEN-INJECTOR Inject 45 unit subcutaneously once a day   Aspirin Low Dose 81 mg tablet,delayed release (DR/EC) (aspirin) Take 1 tablet by mouth once a day   Ozempic 1 mg/dose (2 mg/1.5 mL) pen injector (semaglutide) Take 1 mg subcutaneously once a week   potassium chloride 20 mEq tablet extended release (potassium chloride) TAKE 1 TABLET BY MOUTH EVERY DAY  losartan 100 mg tablet (losartan) TAKE 1 TABLET BY MOUTH EVERY DAY  carvedilol 12.5 mg tablet (carvedilol) TAKE 1 TABLET BY MOUTH TWICE A DAY    LAB DATA:   Latest Reference Range & Units 09/16/22 12:31   Sodium 136 - 144 mEQ/L 136   Potassium 3.6 - 5.1 mEQ/L 4.9   Chloride 98 - 109 mEQ/L 104   CO2 22 - 32 mEQ/L 24   BUN 8 - 20 mg/dL 20   Creatinine 0.8 - 1.3 mg/dL 1.1   Glucose 70 - 99 mg/dL 291 (H)   Calcium 8.9 - 10.3 mg/dL 9.4   AST (SGOT) 15 - 41 IU/L 26   ALT (SGPT) 16 - 63 IU/L 30   Alkaline Phosphatase 35 - 126 IU/L 87   Total Protein 6.0 - 8.2 g/dL 6.6   Albumin 3.4 - 5.0 g/dL 4.4   Bilirubin, Total 0.3 - 1.2 mg/dL 0.3   eGFR >=60.0 mL/min/1.73m*2 >60.0   Anion Gap 3 - 15 mEQ/L 8   Magnesium 1.8 - 2.5 mg/dL 2.3   WBC 3.80 -  10.50 K/uL 4.74   RBC 4.50 - 5.80 M/uL 4.55   Hemoglobin 13.7 - 17.5 g/dL 14.5   Hematocrit 40.1 - 51.0 % 42.3   MCV 83.0 - 98.0 fL 93.0   MCH 28.0 - 33.2 pg 31.9   MCHC 32.2 - 36.5 g/dL 34.3   RDW 11.6 - 14.4 % 13.0   Platelets 150 - 350 K/uL 142 (L)   MPV 9.4 - 12.4 fL 10.3   Differential Type  Auto   nRBC <=0.0 % 0.0   Immature Granulocytes % 0.8   Neutrophils % 70.9   Lymphocytes % 17.1   Monocytes % 7.4   Eosinophils % 3.2   Basophils % 0.6   Immature Granulocytes, Absolute 0.00 - 0.08 K/uL 0.04   Neutrophils, Absolute 1.70 - 7.00 K/uL 3.36   Lymphocytes, Absolute 1.20 - 3.50 K/uL 0.81 (L)   Monocytes, Absolute 0.30 - 1.00 K/uL 0.35   Eosinophils, Absolute 0.04 - 0.54 K/uL 0.15   Basophils, Absolute 0.01 - 0.10 K/uL 0.03   Protime 12.2 - 14.5 sec 26.1 (H)   INR    2.5   PTT 23 - 35 sec 36 (H)   SARS-CoV-2 (COVID-19) Negative  Negative   (H): Data is abnormally high  (L): Data is abnormally low    CARDIAC STUDIES:  Echocardiogram: 9/9/22:   Summary:     The left ventricular ejection fraction is 50%.     The left ventricular ejection fraction is mildly reduced.     LV apical aneurysm    Mild left ventricular hypertrophy is present.     The left atrium is dilated.     The right atrium is dilated.     An intravascular catheter is identified within the right heart.     The mitral valve is a mechanical prosthetic valve.     The aortic valve is sclerotic without restriction of motion.     The aortic valve area is 2.0 cm2.  Peak 10, mean 6 mmhg   The tricuspid valve has mild regurgitation.     The estimated systolic pulmonary artery pressure is 32 mmHg.     No change from prior study.    ECG Date:  06/24/2022   Heart Rate: 66 bpm.  QRS: 124 msec. QT: 416 msec.   Summary: Abnormal tracing.    Device Interrogation: 6/24/22  Implanted Device     Date Evaluated: 06/24/2022    Follow-up location: In office    : [employer],  Name: Telignen 100,   Model #: E110,   Serial #: [id number].    Date Implanted:  2012    Device Characteristics    Device:  Dual Chamber    Type:    Implantable defibrillator    Remote: Latitude    Patient Characteristics     Atrial Indication:   Paroxysmal atrial fibrillation    Ejection Fraction:   50 % by Echo    Underlying patient rhythm: Sinus rhythm    Pacemaker Dependent:  No    Right Atrial Lead    : [employer]  Model: [id number]  Serial: [id number]  Implant Date: 2006    Right Ventricular Lead    : [employer]  Model: 0184  Serial: [id number]  Implant Date: 2006    Battery Status     Battery Status:  MOL    Predicted Remaining Longevity: 5 months    Battery is adequate    Right Atrial Lead Measurements     Signal Amplitude: 0.7 mV  Impedance: 381 ohms  Threshold: 0.7 volts @ 0.5 msec.    RA lead is normal    Right Ventricular Lead Measurements     Impedance: 424 ohms  Threshold: 1.2 volts @ 1.0 msec.    Shock Impedance: 56 ohms    Percent Pacing     RA Pacing:  3 %.    RV Pacin %.    Mode Switching     Total # Episodes:  5    Mode Switched:  0.1 %    Detected Tachyarrhythmias     VF Episodes:   0    Fast VT Episodes:   0    Slow VT Episodes:   0    Atrial FIb Episodes:  0    Atrial Tach Episodes:  5    NonSustained Episodes:  2    Nonsustained episodes were VT    Device Measured Patient Clinical Indicator(s)     Indicator Respiratory rate   Bradycardia Mode and Timing Settings    Pacemaker Mode:  DDDR    Base Rate:   60 bpm    Max Track Rate:  100 bpm    Max Sensor Rate:  120 bpm    Maximum AV Delay:  390 msec    PV Delay:  390 msec    Bradycardia Output and Sensitivity Settings    Right Atrium: 0.5 msec,  2.0 volts,  AGC 0.25 mV sensitivity.    Right Ventricle: 1 msec,  3.0 volts,  AGC 0.6 mV sensitivity.    Tachyarrhythmia Detection     Ventricular Fibrillation Detect Rate:  210 bpm    Faster VT Detect Rate:   180 bpm    Slower VT Detect Rate:   150 bpm    Atrial Fib Detect Rate:   140 bpm    Tachyarrhythmia Therapies     Ventricular  Fibrillation Therapy: Shock therapy        Initial shock: 23 Joules    Final shock: 41 Joules.    Fast Ventricular Tachycardia Therapies: Antitachycardia pacing and shock therapy        Initial shock: 14 Joules    Final shock: 41 Joules.    Slow Ventricular Tachycardia Therapies: Antitachycardia pacing    Summary Comments  This is an excellent dual chamber implantable defibrillator follow-up. No abnormalities are noted. There were no significant arrhythmias detected. The device's lead(s) are functioning normally. < 5 months reaining.    Electrophysiologist: Layo Alarcon M.D.    Past Cath History:   February 2006. 90% distal LAD with LV apical aneurysm    Impression and Plan:    1) Sustained VT s/p VT ablation and [employer] Dual Chamber ICD in situ at [facility] - Presents to [facility]'s Electrophysiology Lab for an elective [employer] up-grade to Bi-ventricular ICD.      On beta-blocker therapy.  Last interrogation in the office notes no new episodes of VT noted on device interrogation.  [employer] dual-chamber ICD shows normal function and battery longevity 1 year. Upwards of 44%  RV pacing noted.  Patient requested that the mode be placed back to DDDR as he felt unwell with the mode DDIR in the past.  He had complaints of fatigue and dizziness shortly after that device adjustment in an attempt to diminish RV pacing and preserve battery.  He feels much better with regards to his prior symptoms.  The device interrogated today notes battery is at EOL.  There is adequate sensing, thresholds are stable, and impedances are stable on the atrial and ventricular leads.  No therapies have been delivered and there have been no ventricular arrhythmias.  Short durations of atrial fibrillation were noted with the longest episode lasting 45 seconds since June 2022.  The device/leads are stable for the procedure today.    The chart/records reviewed, patient interviewed and examined.  Procedure technique  re-reviewed with additional questions answered to their verbal understanding.  Risks, alternatives, and potential therapeutic benefits discussed.  Patient is noted to be taking systemic anticoagulation.  The fact that he is at increased risk for bleeding and bleeding complication was discussed.  Last dose of Warfarin was 9/18/22.  A PT/INR will be drawn prior to the procedure today.  Patient has been NPO since midnight.  Consent form was reviewed, patient executed document verbalizing understanding and wish to proceed.    2)  Paroxysmal Atrial Fibrillation -  S/p 2 AFib ablation / atypical atrial flutter ablation at the [facility].  AFib burden < 0.1%. He is asymptomatic from an atrial arrhythmia standpoint.  He remains on warfarin, Coreg, and dofetilide.      3)  LBBB - most recent echocardiogram on 9/9/22 notes EF of 50%.  Patient scheduled for upgrade to Bi-V ICD today.    4)  Mechanical Mitral Valve in situ - Recent echocardiogram performed and notes normal functioning.  On coumadin therapy.  Monitor routine INRs.    5)  Obstructive sleep apnea - patient is compliant with CPAP.

## 2022-09-20 ENCOUNTER — ANESTHESIA (OUTPATIENT)
Dept: CARDIOLOGY | Facility: HOSPITAL | Age: 77
Setting detail: HOSPITAL OUTPATIENT SURGERY
End: 2022-09-20
Payer: MEDICARE

## 2022-09-20 ENCOUNTER — HOSPITAL ENCOUNTER (OUTPATIENT)
Facility: HOSPITAL | Age: 77
Discharge: HOME | End: 2022-09-20
Attending: INTERNAL MEDICINE | Admitting: INTERNAL MEDICINE
Payer: MEDICARE

## 2022-09-20 ENCOUNTER — APPOINTMENT (OUTPATIENT)
Dept: RADIOLOGY | Facility: HOSPITAL | Age: 77
End: 2022-09-20
Attending: PHYSICIAN ASSISTANT
Payer: MEDICARE

## 2022-09-20 VITALS
HEIGHT: 72 IN | HEART RATE: 64 BPM | BODY MASS INDEX: 32.51 KG/M2 | SYSTOLIC BLOOD PRESSURE: 127 MMHG | TEMPERATURE: 97.8 F | DIASTOLIC BLOOD PRESSURE: 58 MMHG | RESPIRATION RATE: 23 BRPM | WEIGHT: 240 LBS | OXYGEN SATURATION: 97 %

## 2022-09-20 DIAGNOSIS — I47.20 VENTRICULAR TACHYCARDIA (CMS/HCC): Primary | ICD-10-CM

## 2022-09-20 DIAGNOSIS — I48.0 PAROXYSMAL ATRIAL FIBRILLATION (CMS/HCC): ICD-10-CM

## 2022-09-20 DIAGNOSIS — Z95.810 PRESENCE OF COMBINATION INTERNAL CARDIAC DEFIBRILLATOR (ICD) AND PACEMAKER: ICD-10-CM

## 2022-09-20 LAB
GLUCOSE BLD-MCNC: 220 MG/DL (ref 70–99)
INR PPP: 2.7
POCT TEST: ABNORMAL
PROTHROMBIN TIME: 28.1 SEC (ref 12.2–14.5)

## 2022-09-20 PROCEDURE — 27800000 HC SUPPLY/IMPLANTS: Performed by: INTERNAL MEDICINE

## 2022-09-20 PROCEDURE — 25000000 HC PHARMACY GENERAL: Performed by: INTERNAL MEDICINE

## 2022-09-20 PROCEDURE — 36415 COLL VENOUS BLD VENIPUNCTURE: CPT | Performed by: PHYSICIAN ASSISTANT

## 2022-09-20 PROCEDURE — 33225 L VENTRIC PACING LEAD ADD-ON: CPT | Performed by: INTERNAL MEDICINE

## 2022-09-20 PROCEDURE — 0JH609Z INSERTION OF CARDIAC RESYNCHRONIZATION DEFIBRILLATOR PULSE GENERATOR INTO CHEST SUBCUTANEOUS TISSUE AND FASCIA, OPEN APPROACH: ICD-10-PCS | Performed by: INTERNAL MEDICINE

## 2022-09-20 PROCEDURE — C1887 CATHETER, GUIDING: HCPCS | Performed by: INTERNAL MEDICINE

## 2022-09-20 PROCEDURE — 3E0102A INTRODUCTION OF ANTI-INFECTIVE ENVELOPE INTO SUBCUTANEOUS TISSUE, OPEN APPROACH: ICD-10-PCS | Performed by: INTERNAL MEDICINE

## 2022-09-20 PROCEDURE — C1894 INTRO/SHEATH, NON-LASER: HCPCS | Performed by: INTERNAL MEDICINE

## 2022-09-20 PROCEDURE — 93005 ELECTROCARDIOGRAM TRACING: CPT | Performed by: PHYSICIAN ASSISTANT

## 2022-09-20 PROCEDURE — 63700000 HC SELF-ADMINISTRABLE DRUG: Performed by: PHYSICIAN ASSISTANT

## 2022-09-20 PROCEDURE — 02H43KZ INSERTION OF DEFIBRILLATOR LEAD INTO CORONARY VEIN, PERCUTANEOUS APPROACH: ICD-10-PCS | Performed by: INTERNAL MEDICINE

## 2022-09-20 PROCEDURE — C1769 GUIDE WIRE: HCPCS | Performed by: INTERNAL MEDICINE

## 2022-09-20 PROCEDURE — 37000001 HC ANESTHESIA GENERAL: Performed by: INTERNAL MEDICINE

## 2022-09-20 PROCEDURE — 25800000 HC PHARMACY IV SOLUTIONS: Performed by: STUDENT IN AN ORGANIZED HEALTH CARE EDUCATION/TRAINING PROGRAM

## 2022-09-20 PROCEDURE — C1900 LEAD, CORONARY VENOUS: HCPCS | Performed by: INTERNAL MEDICINE

## 2022-09-20 PROCEDURE — 71045 X-RAY EXAM CHEST 1 VIEW: CPT

## 2022-09-20 PROCEDURE — 33264 RMVL & RPLCMT DFB GEN MLT LD: CPT | Performed by: INTERNAL MEDICINE

## 2022-09-20 PROCEDURE — 63600000 HC DRUGS/DETAIL CODE: Performed by: STUDENT IN AN ORGANIZED HEALTH CARE EDUCATION/TRAINING PROGRAM

## 2022-09-20 PROCEDURE — 71000001 HC PACU PHASE 1 INITIAL 30MIN: Performed by: INTERNAL MEDICINE

## 2022-09-20 PROCEDURE — C1751 CATH, INF, PER/CENT/MIDLINE: HCPCS | Performed by: INTERNAL MEDICINE

## 2022-09-20 PROCEDURE — 0JPT0PZ REMOVAL OF CARDIAC RHYTHM RELATED DEVICE FROM TRUNK SUBCUTANEOUS TISSUE AND FASCIA, OPEN APPROACH: ICD-10-PCS | Performed by: INTERNAL MEDICINE

## 2022-09-20 PROCEDURE — C1882 AICD, OTHER THAN SING/DUAL: HCPCS | Performed by: INTERNAL MEDICINE

## 2022-09-20 PROCEDURE — 63600105 HC IODINE BASED CONTRAST: Mod: JW | Performed by: INTERNAL MEDICINE

## 2022-09-20 PROCEDURE — 85610 PROTHROMBIN TIME: CPT | Performed by: PHYSICIAN ASSISTANT

## 2022-09-20 PROCEDURE — 71000011 HC PACU PHASE 1 EA ADDL MIN: Performed by: INTERNAL MEDICINE

## 2022-09-20 DEVICE — PACE/SENSE LEAD
Type: IMPLANTABLE DEVICE | Site: CHEST | Status: FUNCTIONAL
Brand: ACUITY™ X4 STRAIGHT

## 2022-09-20 DEVICE — CARDIAC RESYNCHRONIZATION THERAPY DEFIBRILLATOR
Type: IMPLANTABLE DEVICE | Site: CHEST | Status: FUNCTIONAL
Brand: MOMENTUM™ X4 CRT-D

## 2022-09-20 DEVICE — ENVELOPE CMRM6133 ABSORB LRG MR
Type: IMPLANTABLE DEVICE | Status: FUNCTIONAL
Brand: TYRX™

## 2022-09-20 RX ORDER — POTASSIUM CHLORIDE 750 MG/1
20 TABLET, EXTENDED RELEASE ORAL AS NEEDED
Status: DISCONTINUED | OUTPATIENT
Start: 2022-09-20 | End: 2022-09-21 | Stop reason: HOSPADM

## 2022-09-20 RX ORDER — METOCLOPRAMIDE 5 MG/1
10 TABLET ORAL EVERY 6 HOURS PRN
Status: DISCONTINUED | OUTPATIENT
Start: 2022-09-20 | End: 2022-09-21 | Stop reason: HOSPADM

## 2022-09-20 RX ORDER — CEFAZOLIN SODIUM 2 G/100ML
2 INJECTION, SOLUTION INTRAVENOUS
Status: DISCONTINUED | OUTPATIENT
Start: 2022-09-20 | End: 2022-09-21 | Stop reason: HOSPADM

## 2022-09-20 RX ORDER — DOCUSATE SODIUM 100 MG/1
100 CAPSULE, LIQUID FILLED ORAL 2 TIMES DAILY PRN
Status: DISCONTINUED | OUTPATIENT
Start: 2022-09-20 | End: 2022-09-21 | Stop reason: HOSPADM

## 2022-09-20 RX ORDER — METFORMIN HYDROCHLORIDE 500 MG/1
500 TABLET ORAL 2 TIMES DAILY WITH MEALS
Status: DISCONTINUED | OUTPATIENT
Start: 2022-09-20 | End: 2022-09-21 | Stop reason: HOSPADM

## 2022-09-20 RX ORDER — BUPIVACAINE HYDROCHLORIDE 5 MG/ML
INJECTION, SOLUTION EPIDURAL; INTRACAUDAL
Status: DISCONTINUED | OUTPATIENT
Start: 2022-09-20 | End: 2022-09-20 | Stop reason: HOSPADM

## 2022-09-20 RX ORDER — POTASSIUM CHLORIDE 14.9 MG/ML
20 INJECTION INTRAVENOUS AS NEEDED
Status: DISCONTINUED | OUTPATIENT
Start: 2022-09-20 | End: 2022-09-21 | Stop reason: HOSPADM

## 2022-09-20 RX ORDER — ROSUVASTATIN CALCIUM 20 MG/1
20 TABLET, COATED ORAL EVERY EVENING
Status: DISCONTINUED | OUTPATIENT
Start: 2022-09-20 | End: 2022-09-21 | Stop reason: HOSPADM

## 2022-09-20 RX ORDER — LANOLIN ALCOHOL/MO/W.PET/CERES
400 CREAM (GRAM) TOPICAL 2 TIMES DAILY PRN
Status: DISCONTINUED | OUTPATIENT
Start: 2022-09-20 | End: 2022-09-21 | Stop reason: HOSPADM

## 2022-09-20 RX ORDER — IBUPROFEN 200 MG
16-32 TABLET ORAL AS NEEDED
Status: DISCONTINUED | OUTPATIENT
Start: 2022-09-20 | End: 2022-09-21 | Stop reason: HOSPADM

## 2022-09-20 RX ORDER — POTASSIUM CHLORIDE 750 MG/1
40 TABLET, EXTENDED RELEASE ORAL AS NEEDED
Status: DISCONTINUED | OUTPATIENT
Start: 2022-09-20 | End: 2022-09-21 | Stop reason: HOSPADM

## 2022-09-20 RX ORDER — PROPOFOL 10 MG/ML
INJECTION, EMULSION INTRAVENOUS CONTINUOUS PRN
Status: DISCONTINUED | OUTPATIENT
Start: 2022-09-20 | End: 2022-09-20 | Stop reason: SURG

## 2022-09-20 RX ORDER — ATROPINE SULFATE 0.1 MG/ML
0.5 INJECTION INTRAVENOUS EVERY 5 MIN PRN
Status: DISCONTINUED | OUTPATIENT
Start: 2022-09-20 | End: 2022-09-21 | Stop reason: HOSPADM

## 2022-09-20 RX ORDER — ACETAMINOPHEN 325 MG/1
650 TABLET ORAL EVERY 4 HOURS PRN
Status: DISCONTINUED | OUTPATIENT
Start: 2022-09-20 | End: 2022-09-20

## 2022-09-20 RX ORDER — CARVEDILOL 12.5 MG/1
12.5 TABLET ORAL 2 TIMES DAILY WITH MEALS
Status: DISCONTINUED | OUTPATIENT
Start: 2022-09-20 | End: 2022-09-21 | Stop reason: HOSPADM

## 2022-09-20 RX ORDER — DEXTROSE 40 %
15-30 GEL (GRAM) ORAL AS NEEDED
Status: DISCONTINUED | OUTPATIENT
Start: 2022-09-20 | End: 2022-09-21 | Stop reason: HOSPADM

## 2022-09-20 RX ORDER — ALUMINUM HYDROXIDE, MAGNESIUM HYDROXIDE, AND SIMETHICONE 1200; 120; 1200 MG/30ML; MG/30ML; MG/30ML
30 SUSPENSION ORAL EVERY 4 HOURS PRN
Status: DISCONTINUED | OUTPATIENT
Start: 2022-09-20 | End: 2022-09-21 | Stop reason: HOSPADM

## 2022-09-20 RX ORDER — ACETAMINOPHEN 325 MG/1
650 TABLET ORAL EVERY 4 HOURS PRN
Status: DISCONTINUED | OUTPATIENT
Start: 2022-09-20 | End: 2022-09-21 | Stop reason: HOSPADM

## 2022-09-20 RX ORDER — TAMSULOSIN HYDROCHLORIDE 0.4 MG/1
0.4 CAPSULE ORAL DAILY
Status: DISCONTINUED | OUTPATIENT
Start: 2022-09-21 | End: 2022-09-21 | Stop reason: HOSPADM

## 2022-09-20 RX ORDER — WARFARIN SODIUM 5 MG/1
5 TABLET ORAL DAILY
Status: DISCONTINUED | OUTPATIENT
Start: 2022-09-20 | End: 2022-09-21 | Stop reason: HOSPADM

## 2022-09-20 RX ORDER — CHOLECALCIFEROL (VITAMIN D3) 25 MCG
1000 TABLET ORAL DAILY
Status: DISCONTINUED | OUTPATIENT
Start: 2022-09-21 | End: 2022-09-21 | Stop reason: HOSPADM

## 2022-09-20 RX ORDER — METOCLOPRAMIDE HYDROCHLORIDE 5 MG/ML
10 INJECTION INTRAMUSCULAR; INTRAVENOUS EVERY 6 HOURS PRN
Status: DISCONTINUED | OUTPATIENT
Start: 2022-09-20 | End: 2022-09-21 | Stop reason: HOSPADM

## 2022-09-20 RX ORDER — CEFAZOLIN SODIUM 2 G/100ML
INJECTION, SOLUTION INTRAVENOUS AS NEEDED
Status: DISCONTINUED | OUTPATIENT
Start: 2022-09-20 | End: 2022-09-20 | Stop reason: SURG

## 2022-09-20 RX ORDER — LIDOCAINE HYDROCHLORIDE 10 MG/ML
INJECTION, SOLUTION INFILTRATION; PERINEURAL
Status: DISCONTINUED | OUTPATIENT
Start: 2022-09-20 | End: 2022-09-20 | Stop reason: HOSPADM

## 2022-09-20 RX ORDER — SODIUM CHLORIDE 9 MG/ML
INJECTION, SOLUTION INTRAVENOUS CONTINUOUS PRN
Status: DISCONTINUED | OUTPATIENT
Start: 2022-09-20 | End: 2022-09-20 | Stop reason: SURG

## 2022-09-20 RX ORDER — DEXTROSE 50 % IN WATER (D50W) INTRAVENOUS SYRINGE
25 AS NEEDED
Status: DISCONTINUED | OUTPATIENT
Start: 2022-09-20 | End: 2022-09-21 | Stop reason: HOSPADM

## 2022-09-20 RX ORDER — FENTANYL CITRATE 50 UG/ML
INJECTION, SOLUTION INTRAMUSCULAR; INTRAVENOUS AS NEEDED
Status: DISCONTINUED | OUTPATIENT
Start: 2022-09-20 | End: 2022-09-20 | Stop reason: SURG

## 2022-09-20 RX ORDER — POTASSIUM CHLORIDE 750 MG/1
20 TABLET, EXTENDED RELEASE ORAL DAILY
Status: DISCONTINUED | OUTPATIENT
Start: 2022-09-21 | End: 2022-09-21 | Stop reason: HOSPADM

## 2022-09-20 RX ORDER — NEOMYCIN AND POLYMYXIN B SULFATES 40; 200000 MG/ML; [USP'U]/ML
SOLUTION IRRIGATION
Status: DISCONTINUED | OUTPATIENT
Start: 2022-09-20 | End: 2022-09-20 | Stop reason: HOSPADM

## 2022-09-20 RX ORDER — IODIXANOL 320 MG/ML
INJECTION, SOLUTION INTRAVASCULAR
Status: DISCONTINUED | OUTPATIENT
Start: 2022-09-20 | End: 2022-09-20 | Stop reason: HOSPADM

## 2022-09-20 RX ORDER — ASPIRIN 81 MG/1
81 TABLET ORAL DAILY
Status: DISCONTINUED | OUTPATIENT
Start: 2022-09-21 | End: 2022-09-21 | Stop reason: HOSPADM

## 2022-09-20 RX ORDER — DIPHENHYDRAMINE HCL 25 MG
25 CAPSULE ORAL EVERY 6 HOURS PRN
Status: DISCONTINUED | OUTPATIENT
Start: 2022-09-20 | End: 2022-09-21 | Stop reason: HOSPADM

## 2022-09-20 RX ORDER — DOFETILIDE 0.25 MG/1
250 CAPSULE ORAL 2 TIMES DAILY
Status: DISCONTINUED | OUTPATIENT
Start: 2022-09-20 | End: 2022-09-21 | Stop reason: HOSPADM

## 2022-09-20 RX ORDER — LOSARTAN POTASSIUM 100 MG/1
100 TABLET ORAL EVERY EVENING
Status: DISCONTINUED | OUTPATIENT
Start: 2022-09-20 | End: 2022-09-21 | Stop reason: HOSPADM

## 2022-09-20 RX ORDER — DIPHENHYDRAMINE HCL 50 MG/ML
25 VIAL (ML) INJECTION EVERY 6 HOURS PRN
Status: DISCONTINUED | OUTPATIENT
Start: 2022-09-20 | End: 2022-09-21 | Stop reason: HOSPADM

## 2022-09-20 RX ADMIN — PROPOFOL 150 MCG/KG/MIN: 10 INJECTION, EMULSION INTRAVENOUS at 13:18

## 2022-09-20 RX ADMIN — FENTANYL CITRATE 50 MCG: 50 INJECTION, SOLUTION INTRAMUSCULAR; INTRAVENOUS at 13:46

## 2022-09-20 RX ADMIN — SODIUM CHLORIDE: 9 INJECTION, SOLUTION INTRAVENOUS at 13:11

## 2022-09-20 RX ADMIN — CEFAZOLIN SODIUM 2 G: 2 INJECTION, SOLUTION INTRAVENOUS at 13:13

## 2022-09-20 RX ADMIN — ACETAMINOPHEN 650 MG: 325 TABLET ORAL at 15:45

## 2022-09-20 RX ADMIN — FENTANYL CITRATE 50 MCG: 50 INJECTION, SOLUTION INTRAMUSCULAR; INTRAVENOUS at 13:18

## 2022-09-20 NOTE — PROGRESS NOTES
Same Day Discharge after Pacemaker Implant.     All pre and Intra procedural criteria met.    Wound incision is stable.  Patient to be discharged home with spouse.  Tolerated diet.   Voided without difficulty. Vital signs stable post Pacemaker Implant.   No neuro changes.   Patient ambulated without symptoms. EKG unchanged. Chest radiograph completed and read.  Device interrogated by device company clinical specialist representative and confirmed by provider.    Patient educated regarding the pacemaker monitoring system.  Patient instructed to call the office if there are any questions; the number was provided.  Patient will be followed-up in 7-10 days for a wound incision check.  Okay for discharge home.

## 2022-09-20 NOTE — POST-PROCEDURE NOTE
Pt taken to  area via wheelchair after discharge to his wife's car. Pt left with AVS and home monitoring equipment. Questions answered. Site WDL.

## 2022-09-20 NOTE — DISCHARGE INSTRUCTIONS
You will have a follow-up appointment with Dr. Layo Alarcon's Nurse Practitioner on 10/04/22 at 08:30 AM. [address] [facility], [address], [place] [address]. Call [phone] if you need to reschedule.  -Your INR today was 2.7. Your goal INR is 2.5-3.5. Resume your blood thinner warfarin (Coumadin) this evening 09/20/22. Take Coumadin 10 mg by mouth this evening. Then, continue to take Coumadin 5 mg by mouth daily. Continue routine INR checks.   -Continue to take all of your other current home medications as usual.     ICD DISCHARGE INSTRUCTIONS    * Returning to work, driving, and other daily activities should be discussed with your doctor before discharge from the hospital. Generally, driving is safe after 24 hours unless you are taking narcotics for pain relief.    * Notify your doctor immediately if you notice any of the following: fever, chills, warmth, redness, swelling, or drainage at your incision.    * There are some restrictions for your arm on the same side as your device:      -Do not reach behind your back or lift your arm above shoulder level for four weeks. After this period, it is usually safe to resume normal physical activities.     -Avoid activities that involve exertional or vigorous movements of the affected arm, such as golf or tennis, until you are cleared at your 4-6 week follow up visit.     -Avoid lifting any objects greater than ten pounds for four weeks.    * If you should receive a single defibrillator shock from your ICD please do the following: If you feel well, contact your electrophysiologist. If you do not feel well (chest pain, shortness of breath, passed out), or receive more than one ICD shock, call 9-1-1 to be transported to the nearest emergency room.    * Many defibrillators are now MRI compatible. Please discuss this further with your electrophysiologist before scheduling an MRI.    * Household electronics (microwaves, cellular phones) are safe for use.    * Place the  temporary identification card in your wallet at time of discharge and replace it with your permanent card once it is mailed to your home in four to eight weeks. Make sure you inform healthcare providers that you have an implanted device, particularly if surgical procedures are scheduled.    * Please read your device 's information booklet for future reference. Write down any questions you may have and bring a list with you to your follow-up appointment.    * You were provided instructions for use of your home monitoring system. This was also demonstrated for you. Please review the information and connect the monitor at your bedside, if applicable. Perform a manual transmission tomorrow morning, as described in the instructions.       WOUND CARE:    * Keep your incision dry for 24 hours. When showering after that, keep the incision out of a direct stream of water for one week, and use soap and water only. Do not scrub the incision. Instead, pat it dry. Do not apply any creams, lotions, or powders to your incision.    * If your incision has steri-strips, do not remove them. They will fall off on their own. You may clip the ends with scissors if they curl up.    * If your incision has medical glue, it will flake off on its own. Do not pick it off.    * You should avoid submerging the incision under water such as in a hot tub, bath tub, swimming pool, or ocean for at least one week.    THINGS YOU SHOULD AVOID:    * Any large magnets including industrial equipment, induction furnaces, arc welding or large electrical motors.    * Security systems at airports do not interfere with your device. However, your device is made of metal and will therefore set off metal detectors. Have your device identification card ready for presentation to the .    * Some medical procedures use cautery, diathermy, ultrasound therapy, and radiation therapy which may interfere with your device. Notify the performing  physician that you have a defibrillator.    * Do not push on the area around your device or twist the device under the skin.

## 2022-09-20 NOTE — POST-PROCEDURE NOTE
Report from Duane, Rn. Pt resting in bed and talking on cell phone - appears comfortable. Once off phone,  reviewed discharge paperwork with patient and answered questions. Noted small hematoma under steri-strips and just above pacemaker insertion site. Pt states mild tenderness. PA aware and at bedside - pressure held on site by PA.

## 2022-09-20 NOTE — ANESTHESIA POSTPROCEDURE EVALUATION
Patient: Roberto Stiles    Procedure Summary     Date: 09/20/22 Room / Location:  [facility] / [facility] CARDIAC CATH/EP/NEURO    Anesthesia Start: 1311 Anesthesia Stop: 1503    Procedures:       BIVENTRICULAR ICD GEN CHANGE (N/A )      INSERT LEFT VENTRICULAR LEAD - ADD ON (BI-V UPGRADE) (N/A ) Diagnosis:       Paroxysmal atrial fibrillation (CMS/HCC)      (Paroxysmal atrial fibrillation (CMS/HCC) [I48.0])    Providers: Layo Alarcon MD Responsible Provider: Andrez Aguilar MD    Anesthesia Type: general ASA Status: 3          Anesthesia Type: general  PACU Vitals    No data found in the last 10 encounters.           Anesthesia Post Evaluation    Pain management: adequate  Patient participation: complete - patient participated  Level of consciousness: awake and alert  Cardiovascular status: acceptable  Airway Patency: adequate  Respiratory status: acceptable  Hydration status: acceptable  Anesthetic complications: no

## 2022-09-20 NOTE — POST-PROCEDURE NOTE
Post ICD EKG and PCXR completed and viewed by Dr Alarcon   Subjective:   Kelly Lovell is a 56 [facility] minute clinic and the NP Leti Barragan NP  09/06/19

## 2022-09-20 NOTE — Clinical Note
Patient placed on procedure table in supine position with arms at side. Positioning devices: all pressure points padded, arm board under arms, heel pads in place, safety strap applied, wrist straps in place and pillow under knees.

## 2022-09-20 NOTE — Clinical Note
The Crt-d Applied Identity X4 Is4 Df1 device was inserted. The leads were placed into the connector and visually verified to be in correct position. Injury current obtained.

## 2022-09-20 NOTE — ANESTHESIA PREPROCEDURE EVALUATION
Relevant Problems   CARDIOVASCULAR   (+) Atrial fibrillation (CMS/HCC)   (+) Essential hypertension   (+) Presence of combination internal cardiac defibrillator (ICD) and pacemaker      NEUROLOGY   (+) CVA (cerebral vascular accident) (CMS/HCC)      RESPIRATORY SYSTEM   (+) SANA on CPAP      Other   (+) Type 2 diabetes mellitus, with long-term current use of insulin (CMS/HCC)       ROS/Med Hx     Past Surgical History:   Procedure Laterality Date    CARDIAC ELECTROPHYSIOLOGY MAPPING AND ABLATION      CARDIAC SURGERY      KIDNEY STONE SURGERY      KNEE SURGERY      MITRAL VALVE REPLACEMENT      TONSILLECTOMY         Physical Exam    Airway   Mallampati: III   TM distance: <3 FB   Neck ROM: limited  Dental    Teeth Problems: missing        Anesthesia Plan    Plan: general    Technique: total IV anesthesia     Airway: natural airway / supplemental oxygen   ASA 3  Anesthetic plan and risks discussed with: patient  Induction:    intravenous

## 2022-09-20 NOTE — POST-PROCEDURE NOTE
EP Service Post Op Check-Device Upgrade    77 y.o. male s/p [employer] dual-chamber ICD to BiVentricular ICD upgrade by Dr. Layo Alarcon today. Patient is feeling well. He denies chest pain or shortness of breath. No pain at incision site.     Visit Vitals  /63   Pulse 62   Temp 36.6 °C (97.8 °F) (Temporal)   Resp (!) 22   Ht 1.829 m (6')   Wt 109 kg (240 lb)   SpO2 94%   BMI 32.55 kg/m²     Left chest incision C/D/I, no bleeding or hematoma. Left arm sling and pressure dressing in place    Assessment/Plan:  1) Sustained ventricular tachycardia/ LBBB status post [employer] dual-chamber ICD to BiVentricular ICD upgrade/ Paroxysmal atrial fibrillation s/p two atrial fibrillation/ atypical atrial flutter ablations    -bedrest x 1 hours  -left arm sling and dressing overnight  -post-op chest x-ray and ECG reviewed by physician  -repeat device interrogation will be completed prior to discharge  -keep incision dry for 3 days  -device education, wound care, and arm restrictions reviewed with patient in detail  -remove dressing tomorrow  -pain control with prn Tylenol  -patient received IV Ancef at the beginning of the procedure, no additional doses of antibiotics necessary to complete 24 hours of surgical prophylaxis  -restart Coumadin 5 mg by mouth daily this evening 9/20/22, INR 2.7 today, Goal INR 2.5-3.5 (Mechanical mitral valve)  -continue Coreg 12.5 mg by mouth twice daily  -continue Tikosyn 250 mcg by mouth twice daily  -remote monitor paired with device  -device is programmed DDD 60 - 120 bpm, LV1Tip to Can, Tachytherapy zones (VT1 zone >150 bpm, VT zone >180 bpm, VF zone>210 bpm)  -anticipated discharge to home this evening at 1900 if no bleeding or hematoma  -F/U with Dr. Layo Alarcon's Nurse Practitioner on 10/04/22 at 08:30 AM for incision/device check    Marisol Galan PA-C

## 2022-09-20 NOTE — ANESTHESIOLOGIST PRE-PROCEDURE ATTESTATION
Pre-Procedure Patient Identification:  I am the Primary Anesthesiologist and have identified the patient on 09/20/22 at 10:28 AM.   I have confirmed the procedure(s) will be performed by the following surgeon/proceduralist Layo Alarcon MD.

## 2022-09-20 NOTE — Clinical Note
A venogram was performed on the coronary sinus using Visipaque contrast dye. Injected volume = 15 mL.

## 2022-09-20 NOTE — POST-PROCEDURE NOTE
Pt ambulated in hallway to restroom with standby assistance provided - ambulated with steady gait. Discharge instructions again reviewed with pt and reviewed updated Coumadin dose for this evening. Pt verbalized understanding Pt's site WDL and pt resting comfortably in bed using cell phone after.

## 2022-09-21 LAB
ATRIAL RATE: 36
ATRIAL RATE: 67
QRS DURATION: 146
QRS DURATION: 156
QT INTERVAL: 518
QT INTERVAL: 530
QTC CALCULATION(BAZETT): 525
QTC CALCULATION(BAZETT): 542
R AXIS: 155
R AXIS: 187
T WAVE AXIS: 25
T WAVE AXIS: 34
VENTRICULAR RATE: 62
VENTRICULAR RATE: 63

## 2022-10-25 ENCOUNTER — TRANSCRIBE ORDERS (OUTPATIENT)
Dept: SLEEP MEDICINE | Facility: HOSPITAL | Age: 77
End: 2022-10-25

## 2022-10-25 DIAGNOSIS — G47.33 OBSTRUCTIVE SLEEP APNEA (ADULT) (PEDIATRIC): Primary | ICD-10-CM

## 2022-10-30 ENCOUNTER — HOSPITAL ENCOUNTER (OUTPATIENT)
Dept: SLEEP MEDICINE | Facility: HOSPITAL | Age: 77
Discharge: HOME | End: 2022-10-30
Attending: INTERNAL MEDICINE
Payer: MEDICARE

## 2022-10-30 DIAGNOSIS — G47.33 OBSTRUCTIVE SLEEP APNEA (ADULT) (PEDIATRIC): ICD-10-CM

## 2022-10-30 PROCEDURE — 95811 POLYSOM 6/>YRS CPAP 4/> PARM: CPT

## 2022-10-31 VITALS — OXYGEN SATURATION: 98 % | BODY MASS INDEX: 32.51 KG/M2 | HEIGHT: 72 IN | WEIGHT: 240 LBS

## 2023-11-27 NOTE — H&P
Roberto Stiles is a 77 yo male patient of Dr. Teran who presents today for elective DCCV in setting of breakthrough, persistent Afib while on Tikosyn. He is anticoagulated with Coumadin; maintains very well controlled INR at 3.0-3.2 for over the past year. His INR this morning via home monitoring was 3.5.    There have been no  interim changes in his medical history since the office visit.     Treatment still indicated    There have been no medication changes.     Has been NPO since midnight    ALLERGIES Patient has no known allergies.    10pt ROS completed, negative  PE reveals irreg rhythm, otherwise benign          History of Present Illness:  Status post repeat procedure at [facility] on 11/05/2015  Complex ablation procedure included A. fib and a flutter ablation    On Tikosyn 250 mcg twice a day-no recurrent A. fib or flutter    Recent check by Dr. Lou, occasional short PAF otherwise in sinus rhythm on Tikosyn  No dyspnea, chest pain    He is working out with a  twice a week  Diet and exercise was reviewed    Underwent vein ablationboth legs    He had an episode of tachycardia and ATP  He had a VT ablation in January 2019    PET Stress test shows fixed apical defect, scar, no ischemia Nov 2018  LVEF 50% on recent echo    Recent echo demonstrated good function of his mechanical mitral valve  He had an episode Of AFib lasting all day several days ago but converted spontaneously back into sinus rhythm fortunately  His weight is stable  Legs are slightly puffier  Got the Covid VAX  He had recent episode of neurological symptoms, had acute right cerebellar and basal ganglion infarct small, resolved  His INR had dipped down to 2.1  He was placed on Lovenox and Coumadin adjusted 5 mg daily latest INR is 3.4     Had jonathon 06/07/2021 EF 55-60% normal functioning mechanical prosthetic mitral valve peak 7 mean 2 mm of mercury no thrombus  No PFO  On aspirin and coumadin  No significant heart failure  symptoms  Recently had Bi V upgrade device September 2022   had a bouts of AFib in [place]    Is back in AFib for the last few weeks and remains in AFib, minimal symptoms, has gained weight is having problems with CPAP    Past Medical and Surgical Histories   Past Medical History:   Raine Shiley mitral valve replacement at age 36: May 14 1982  Paroxysmal A. fib  Parox Atrial Flutter- cardioverted 6/3/14  Right A. flutter ablation August 2014  Left A. flutter ablation August 2014  Sustained VT, secondary to Rythmol 2/06-VT ablation January 2019  Rheumatic fever at age 9  Sleep apnea  Coronary disease, embolic infarct to the LAD due to endocarditis.  ICD-[employer]-upgrade to Bi V ICD 09/20/2022  History of phantom shocks  Small CVA cerebellar and basal ganglia    AAA negative Sep 2014  Carotid ultrasound.  July 2019-mild bilateral disease  Echo march 2013. 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 EF 50%, normal mechanical mitral valve function no LA clot  Cardioversion 6/1/15  A flutter ablation, A. fib ablation 11/05/2015 [facility]  Echo 11/06/2015 EF 45% mechanical AVR function normal  2-D echo 05/02/2017. EF 50%. LV apical aneurysm. Normal mechanical mitral valve. Mild TR, normal RV function, normal PA pressure, aortic valve sclerosis  Echo 12/15/2020: EF 50%, LV apical aneurysm, or other walls contract normally., Normal mechanical mitral valve, no significant MR, mild TR, normal RV function, normal PA pressure, aortic valve sclerosis  PET scan stress test 11/16/18: Fixed apical infarct no ischemia  Bert 06/07/2021:  Normal functioning mechanical MVR.  09/09/2022:  Stable MVR EF 50%  Past Surgical History:   Raine Shiley mitral valve replacement age 36, 05/14/1982  ICD [employer]  A flutter ablation in August 2014  A flutter and A. fib ablation November 2015  Vein ablation May 2017  VT ablation January  2019  knee  Biventricular upgrade ICD 09/20/2022    Social History     Smoked Tobacco Usage    Smoking Status:  Unknown    Detailed Status:  Unknown if ever smoked    Smokeless Tobacco Usage     Smokeless Status:   Never    Alcohol Usage     Alcohol Status:  Current    He drinks beer infrequently.    Average drink(s) / day: Infrequent    Mostly:   Beer    Family History   Structured Family History  [01]  Father:  No FH Hypertension;  (5/8/2015).  [01]  Father:  No FH Ischemic heart disease;  (5/8/2015).  [01]  Father:  No FH TIA or CVA;  (5/8/2015).  [01]  Mother:  No FH Hypertension;  (5/8/2015).  [01]  Mother:  No FH Ischemic heart disease;  (5/8/2015).  [01]  Mother:  No FH TIA or CVA;  (5/8/2015).  Family History (reviewed - no changes required):   No early coronary artery disease  Sudden cardiac death    Cardiac Catheterization History   Past Cath History:   February 2006. 90% distal LAD with LV apical aneurysm    Review of Systems   General: Denies weight gain and fatigue.   Cardiovascular: Denies chest pain at rest, chest pain with exercise, palpitations, peripheral edema and dyspnea on exertion.   Respiratory: Denies dyspnea at rest and shortness of breath.   Musculoskeletal: Denies myalgias.   Neurologic: Denies pre-syncope, dizziness, pre-syncope, syncope, paresthesias, seizures, tremors, dizziness, transient blindness, frequent falls, //frequent headaches and difficulty walking and Sx of TIA/A.fugax..   Heme/Lymphatic: Denies abnormal bruising and bleeding.     Physical Exam  I have examined the patient and concur with documented physical findings.  Vital Signs:     Height: 72 inches  (10/13/2023).    Weight: 271 pounds    Pulse rhythm: irregular    BP: 120/80 mmHg HR: 75 bpm.    Body Mass Index: 36.89    Body Surface Area: 2.43 m2.  General: Well developed and well nourished.   Eyes: Conjunctiva and sclera clear.   Neck: Supple, no adenopathy and no thyromegaly.   Lungs: Clear to auscultation.   Chest:  No obvious deformity.   Heart: Regular rate and rhythm, S1 and S2 physiologic and PMI not displaced. Mechanical S2 There is a grade 1/6 systolic murmur. Examination of neck veins reveals no neck vein distention.  Carotid artery examination reveals no carotid bruits.    Extremities: There is no cyanosis or clubbing. Bilateral 2+ leg edema is present.    Abdomen: Normoactive bowel sounds and soft and non-tender.   Musculoskeletal: Normal gait and normal strength and tone.   Skin: Warm and dry and no rashes.   Neurologic: Alert, oriented x 3 and appropriate affect.       Resting Electrocardiogram  ECG ordered and personally reviewed:   ECG Date:  11/27/2023   Ventricular pacemaker rhythm with biventricular pacing,    Heart Rate: 75 bpm.   Atrial Rate: 79 bpm. QRS: 110 msec. QT: 430 msec.   Summary: Abnormal tracing.      Medications:   Yaneth Low Dose Aspirin 81 mg tablet,delayed release (DR/EC) (aspirin) Take 1 tablet by mouth once a day   Synjardy 12.5-500 mg tablet (empagliflozin-metformin) Take 1 tablet by mouth twice a day   Lovaza 1 gram capsule (omega-3 acid ethyl esters) Take 1 capsule by mouth once a day   amoxicillin 500 mg capsule (amoxicillin) Take 4 capsule by mouth single dose one hour prior to dental procedure  tamsulosin 0.4 mg capsule (tamsulosin) Take 1 capsule by mouth once a day   warfarin 5 mg tablet (warfarin) Take 1 tablet by mouth based on INR.  potassium chloride 20 mEq tablet extended release (potassium chloride) Take 1 tablet by mouth once a day   rosuvastatin 20 mg tablet (rosuvastatin) Take 1 tablet by mouth once a day   Entresto 49-51 mg tablet (sacubitril-valsartan) Take 1 tablet by mouth twice a day   dofetilide 250 mcg capsule (dofetilide) Take 1 capsule by mouth twice a day   carvedilol 12.5 mg tablet (carvedilol) Take 1 tablet by mouth twice a day   furosemide 20 mg tablet (furosemide) Take 1 tablet by mouth once a day   Mounjaro 2.5 mg/0.5 mL subcutaneous pen injector (tirzepatide)  Inject as directed   Medications reviewed today.    Medications list verified with patient by Eric Teran M.D..    Allergies, Intolerances and/or Therapeutic Variances:    No known allergies and adverse reactions set during this update.  Allergies and adverse reactions reviewed today.      Problems:   MORBID OBESITY BMI => 35 WITH COMORBIDITY (ICD-278.01) (UYM12-Z05.01)  BODY MASS INDEX (BMI) 36 - 37 (ICD-V85.33) (PZY88-X79.36)  PERIPHERAL EDEMA (ICD-782.3) (QHO60-Z00.9)  OBSTRUCTIVE SLEEP APNEA (ICD-327.23) (IGK02-U22.33)  Note: WARFARIN USE (ICD-V58.61) (OPM12-D78.01)  CONSTIPATION, DRUG INDUCED (ICD-564.09) (FUY28-Z98.09)  SHORTNESS OF BREATH (ICD-786.05) (TYZ15-O07.02)  ATRIAL FLUTTER, PAROXYSMAL (ICD-427.32) (QTI41-D15.0)  SUSTAINED VENTRICULAR TACHYCARDIA (ICD-427.1) (EBX72-J14.2)  PAROXYSMAL ATRIAL FIBRILLATION (ICD-427.31) (DPR08-I77.0)  PRIOR MYOCARDIAL INFARCTION (ICD-412) (NSB06-G19.2)  CAD, NATIVE VESSEL, W/O ANGINA (ICD-414.01) (PTY29-U17.10)  DIABETES MELLITUS, TYPE II (ICD-250.00) (TDM57-O35.9)  AICD, S/P (ICD-V45.02) (SBL91-Q74.810)  PROSTHETIC HEART VALVE (ICD-V43.3) (RMA35-U07.2)  Problems reviewed today.      Impression:  1. He has been in AFib this was verified on interrogation today.  AFib started about 1 month ago, I plan cardioversion short daily no JOSELIN by is on chronic Coumadin and INR therapeutic  2.  Dual-chamber pacemaker defibrillator -recently upgraded to a Bi V device 09/20/2022    3.  Atrial arrhythmias -the patient has had nonsustained atrial arrhythmias.  He is on dofetilide.  He has also had nonsustained ventricular arrhythmias.  He   He did undergo an ablation procedure for ventricular tachycardia in 2019. he has also previously had an atrial fibrillation ablation.    4.  Mechanical mitral valve -the patient's valve appears to be functioning appropriately.  He is on Coumadin.  LVEF 50% echo September 2022  He feels well  Today we talked about diet and weight loss   he is in sinus  rhythm he did have an episode of AFib lasting most of the day several days ago he is back and currently in sinus rhythm    6) Recent CVA-while INR was 2.1- now 3.4 on coumadin 5 mg daily neurological symptoms have resolved he is also on low-dose aspirin  INR goal is 3-3.5 along with aspirin  7) diabetes-on insulin, Synjardy, Mounjaro  8) cardiomyopathy with EF 50%-on  entresto  carvedilol, Mounjaro and p.r.n. Lasix          Return visit requested: 4 month(s) as a preferred in office encounter.    Medical Decision Making: Order Unique Test

## 2023-11-28 ENCOUNTER — HOSPITAL ENCOUNTER (OUTPATIENT)
Facility: HOSPITAL | Age: 78
Discharge: HOME | End: 2023-11-28
Attending: INTERNAL MEDICINE | Admitting: INTERNAL MEDICINE
Payer: MEDICARE

## 2023-11-28 ENCOUNTER — ANESTHESIA EVENT (OUTPATIENT)
Dept: ENDOSCOPY | Facility: HOSPITAL | Age: 78
End: 2023-11-28
Payer: MEDICARE

## 2023-11-28 ENCOUNTER — ANESTHESIA (OUTPATIENT)
Dept: ENDOSCOPY | Facility: HOSPITAL | Age: 78
End: 2023-11-28
Payer: MEDICARE

## 2023-11-28 VITALS
RESPIRATION RATE: 16 BRPM | BODY MASS INDEX: 35.21 KG/M2 | HEIGHT: 72 IN | TEMPERATURE: 97.1 F | SYSTOLIC BLOOD PRESSURE: 101 MMHG | WEIGHT: 260 LBS | DIASTOLIC BLOOD PRESSURE: 53 MMHG | HEART RATE: 78 BPM | OXYGEN SATURATION: 94 %

## 2023-11-28 DIAGNOSIS — I48.0 PAROXYSMAL ATRIAL FIBRILLATION (CMS/HCC): ICD-10-CM

## 2023-11-28 PROCEDURE — 71000001 HC PACU PHASE 1 INITIAL 30MIN: Performed by: INTERNAL MEDICINE

## 2023-11-28 PROCEDURE — 63600000 HC DRUGS/DETAIL CODE: Mod: JW | Performed by: STUDENT IN AN ORGANIZED HEALTH CARE EDUCATION/TRAINING PROGRAM

## 2023-11-28 PROCEDURE — 25800000 HC PHARMACY IV SOLUTIONS: Performed by: INTERNAL MEDICINE

## 2023-11-28 PROCEDURE — 5A2204Z RESTORATION OF CARDIAC RHYTHM, SINGLE: ICD-10-PCS | Performed by: INTERNAL MEDICINE

## 2023-11-28 PROCEDURE — 71000011 HC PACU PHASE 1 EA ADDL MIN: Performed by: INTERNAL MEDICINE

## 2023-11-28 PROCEDURE — 37000001 HC ANESTHESIA GENERAL: Performed by: INTERNAL MEDICINE

## 2023-11-28 PROCEDURE — 92960 CARDIOVERSION ELECTRIC EXT: CPT | Performed by: INTERNAL MEDICINE

## 2023-11-28 RX ORDER — SACUBITRIL AND VALSARTAN 49; 51 MG/1; MG/1
1 TABLET, FILM COATED ORAL 2 TIMES DAILY
COMMUNITY

## 2023-11-28 RX ORDER — PROPOFOL 10 MG/ML
INJECTION, EMULSION INTRAVENOUS AS NEEDED
Status: DISCONTINUED | OUTPATIENT
Start: 2023-11-28 | End: 2023-11-28 | Stop reason: SURG

## 2023-11-28 RX ORDER — TIRZEPATIDE 7.5 MG/.5ML
7.5 INJECTION, SOLUTION SUBCUTANEOUS WEEKLY
COMMUNITY
Start: 2023-11-14

## 2023-11-28 RX ORDER — SODIUM CHLORIDE 9 MG/ML
INJECTION, SOLUTION INTRAVENOUS CONTINUOUS
Status: DISCONTINUED | OUTPATIENT
Start: 2023-11-28 | End: 2023-11-28 | Stop reason: HOSPADM

## 2023-11-28 RX ADMIN — PROPOFOL 100 MG: 10 INJECTION, EMULSION INTRAVENOUS at 10:06

## 2023-11-28 RX ADMIN — PROPOFOL 50 MG: 10 INJECTION, EMULSION INTRAVENOUS at 10:13

## 2023-11-28 RX ADMIN — SODIUM CHLORIDE: 9 INJECTION, SOLUTION INTRAVENOUS at 09:40

## 2023-11-28 NOTE — ANESTHESIOLOGIST PRE-PROCEDURE ATTESTATION
Pre-Procedure Patient Identification:  I am the Primary Anesthesiologist and have identified the patient on 11/28/23 at 9:25 AM.   I have confirmed the procedure(s) will be performed by the following surgeon/proceduralist Eric Teran MD.

## 2023-11-28 NOTE — ANESTHESIA POSTPROCEDURE EVALUATION
Patient: Roberto Stiles    Procedure Summary     Date: 11/28/23 Room / Location:  [facility] CARDIOVERSION / [facility]    Anesthesia Start: 1003 Anesthesia Stop: 1024    Procedure: Cardioversion external Diagnosis:       Paroxysmal atrial fibrillation (CMS/HCC)      (Paroxysmal atrial fibrillation (CMS/HCC) [I48.0])    Providers: Eric Teran MD Responsible Provider: Andrez Aguilar MD    Anesthesia Type: general ASA Status: 3          Anesthesia Type: general  PACU Vitals    No data found in the last 10 encounters.           Anesthesia Post Evaluation    Pain management: adequate  Patient participation: complete - patient participated  Level of consciousness: awake and alert  Cardiovascular status: acceptable  Airway Patency: adequate  Respiratory status: acceptable  Hydration status: acceptable  Anesthetic complications: no

## 2023-11-28 NOTE — DISCHARGE INSTRUCTIONS
Follow up with Dr. Teran as scheduled  Call his office with any questions or concerns prior to your appointment   [phone]  Take medications as instructed ; follow your INR and coumadin dosing as directed by Dr. Teran's office        Electrical Cardioversion, Care After   If you have problems or questions, contact your health care provider.    What can I expect after the procedure?  After the procedure, it is common to have:  Some redness on the skin where the shocks were given.      Follow these instructions at home:  Do not drive for 24 hours if you were given a medicine to help you relax (sedative).  Take over-the-counter and prescription medicines only as told by your health care provider.  Ask your health care provider how to check your pulse. Check it often.  Rest for 48 hours after the procedure or as told by your health care provider.  Avoid or limit your caffeine use as told by your health care provider.      Contact a health care provider if:  You feel like your heart is beating too quickly or your pulse is not regular.  You have a serious muscle cramp that does not go away.      Get help right away if:  You have discomfort in your chest.  You are dizzy or you feel faint.  You have trouble breathing or you are short of breath.  Your speech is slurred.  You have trouble moving an arm or leg on one side of your body.  Your fingers or toes turn cold or blue.

## 2023-11-28 NOTE — ANESTHESIA PREPROCEDURE EVALUATION
Relevant Problems   CARDIOVASCULAR   (+) Atrial fibrillation (CMS/HCC)   (+) Essential hypertension   (+) Presence of combination internal cardiac defibrillator (ICD) and pacemaker      NEUROLOGY   (+) CVA (cerebral vascular accident) (CMS/HCC)      RESPIRATORY SYSTEM   (+) SANA on CPAP      Other   (+) Type 2 diabetes mellitus, with long-term current use of insulin (CMS/HCC)       ROS/Med Hx     Past Surgical History:   Procedure Laterality Date    CARDIAC ELECTROPHYSIOLOGY MAPPING AND ABLATION      CARDIAC SURGERY      KIDNEY STONE SURGERY      KNEE SURGERY      MITRAL VALVE REPLACEMENT      TONSILLECTOMY         Physical Exam    Airway   Mallampati: III   TM distance: <3 FB   Neck ROM: limited  Dental    Teeth Problems: missing        Anesthesia Plan    Plan: general    Technique: total IV anesthesia     Airway: natural airway / supplemental oxygen   3 ASA  Anesthetic plan and risks discussed with: patient  Induction:    intravenous

## 2023-12-04 ENCOUNTER — TRANSCRIBE ORDERS (OUTPATIENT)
Dept: SLEEP MEDICINE | Facility: HOSPITAL | Age: 78
End: 2023-12-04

## 2023-12-04 DIAGNOSIS — G47.33 OBSTRUCTIVE SLEEP APNEA (ADULT) (PEDIATRIC): Primary | ICD-10-CM

## 2023-12-07 ENCOUNTER — HOSPITAL ENCOUNTER (OUTPATIENT)
Dept: SLEEP MEDICINE | Facility: HOSPITAL | Age: 78
Discharge: HOME | End: 2023-12-07
Attending: INTERNAL MEDICINE
Payer: MEDICARE

## 2023-12-07 DIAGNOSIS — G47.33 OBSTRUCTIVE SLEEP APNEA (ADULT) (PEDIATRIC): ICD-10-CM

## 2023-12-07 PROCEDURE — 95810 POLYSOM 6/> YRS 4/> PARAM: CPT

## 2023-12-12 ENCOUNTER — HOSPITAL ENCOUNTER (OUTPATIENT)
Facility: HOSPITAL | Age: 78
Setting detail: HOSPITAL OUTPATIENT SURGERY
Discharge: HOME | End: 2023-12-12
Attending: INTERNAL MEDICINE | Admitting: INTERNAL MEDICINE
Payer: MEDICARE

## 2023-12-12 ENCOUNTER — ANESTHESIA EVENT (OUTPATIENT)
Dept: CARDIOLOGY | Facility: HOSPITAL | Age: 78
Setting detail: HOSPITAL OUTPATIENT SURGERY
End: 2023-12-12
Payer: MEDICARE

## 2023-12-12 VITALS
WEIGHT: 250 LBS | HEART RATE: 62 BPM | BODY MASS INDEX: 33.86 KG/M2 | RESPIRATION RATE: 33 BRPM | DIASTOLIC BLOOD PRESSURE: 65 MMHG | SYSTOLIC BLOOD PRESSURE: 140 MMHG | OXYGEN SATURATION: 96 % | HEIGHT: 72 IN | TEMPERATURE: 96.5 F

## 2023-12-12 DIAGNOSIS — I48.0 PAROXYSMAL ATRIAL FIBRILLATION (CMS/HCC): ICD-10-CM

## 2023-12-12 DIAGNOSIS — I48.91 ATRIAL FIBRILLATION, UNSPECIFIED TYPE (CMS/HCC): Primary | ICD-10-CM

## 2023-12-12 LAB
GLUCOSE BLD-MCNC: 112 MG/DL (ref 70–99)
INR PPP: 1.8
POCT TEST: ABNORMAL
PROTHROMBIN TIME: 20.7 SEC (ref 12.2–14.5)

## 2023-12-12 PROCEDURE — 4A023FZ MEASUREMENT OF CARDIAC RHYTHM, PERCUTANEOUS APPROACH: ICD-10-PCS | Performed by: INTERNAL MEDICINE

## 2023-12-12 PROCEDURE — 93005 ELECTROCARDIOGRAM TRACING: CPT | Mod: 59 | Performed by: PHYSICIAN ASSISTANT

## 2023-12-12 PROCEDURE — 25000000 HC PHARMACY GENERAL: Performed by: ANESTHESIOLOGY

## 2023-12-12 PROCEDURE — 71000001 HC PACU PHASE 1 INITIAL 30MIN: Performed by: INTERNAL MEDICINE

## 2023-12-12 PROCEDURE — 93005 ELECTROCARDIOGRAM TRACING: CPT | Performed by: PHYSICIAN ASSISTANT

## 2023-12-12 PROCEDURE — 25800000 HC PHARMACY IV SOLUTIONS: Performed by: ANESTHESIOLOGY

## 2023-12-12 PROCEDURE — 02583ZZ DESTRUCTION OF CONDUCTION MECHANISM, PERCUTANEOUS APPROACH: ICD-10-PCS | Performed by: INTERNAL MEDICINE

## 2023-12-12 PROCEDURE — 27200000 HC STERILE SUPPLY: Performed by: INTERNAL MEDICINE

## 2023-12-12 PROCEDURE — 36415 COLL VENOUS BLD VENIPUNCTURE: CPT | Performed by: PHYSICIAN ASSISTANT

## 2023-12-12 PROCEDURE — 4A0234Z MEASUREMENT OF CARDIAC ELECTRICAL ACTIVITY, PERCUTANEOUS APPROACH: ICD-10-PCS | Performed by: INTERNAL MEDICINE

## 2023-12-12 PROCEDURE — 93650 ICAR CATH ABLTJ AV NODE FUNC: CPT | Performed by: INTERNAL MEDICINE

## 2023-12-12 PROCEDURE — 85610 PROTHROMBIN TIME: CPT | Performed by: PHYSICIAN ASSISTANT

## 2023-12-12 PROCEDURE — 37000001 HC ANESTHESIA GENERAL: Performed by: INTERNAL MEDICINE

## 2023-12-12 PROCEDURE — 25000000 HC PHARMACY GENERAL: Performed by: INTERNAL MEDICINE

## 2023-12-12 PROCEDURE — C1894 INTRO/SHEATH, NON-LASER: HCPCS | Performed by: INTERNAL MEDICINE

## 2023-12-12 PROCEDURE — C2630 CATH, EP, COOL-TIP: HCPCS | Performed by: INTERNAL MEDICINE

## 2023-12-12 PROCEDURE — 63600000 HC DRUGS/DETAIL CODE: Performed by: ANESTHESIOLOGY

## 2023-12-12 PROCEDURE — 71000011 HC PACU PHASE 1 EA ADDL MIN: Performed by: INTERNAL MEDICINE

## 2023-12-12 RX ORDER — ONDANSETRON HYDROCHLORIDE 2 MG/ML
4 INJECTION, SOLUTION INTRAVENOUS
Status: CANCELLED | OUTPATIENT
Start: 2023-12-12

## 2023-12-12 RX ORDER — LIDOCAINE HYDROCHLORIDE 10 MG/ML
INJECTION, SOLUTION INFILTRATION; PERINEURAL AS NEEDED
Status: DISCONTINUED | OUTPATIENT
Start: 2023-12-12 | End: 2023-12-12 | Stop reason: SURG

## 2023-12-12 RX ORDER — LIDOCAINE HYDROCHLORIDE 10 MG/ML
INJECTION, SOLUTION INFILTRATION; PERINEURAL
Status: DISCONTINUED | OUTPATIENT
Start: 2023-12-12 | End: 2023-12-12 | Stop reason: HOSPADM

## 2023-12-12 RX ORDER — ACETAMINOPHEN 325 MG/1
650 TABLET ORAL EVERY 4 HOURS PRN
Status: DISCONTINUED | OUTPATIENT
Start: 2023-12-12 | End: 2023-12-12 | Stop reason: HOSPADM

## 2023-12-12 RX ORDER — MIDAZOLAM HYDROCHLORIDE 2 MG/2ML
INJECTION, SOLUTION INTRAMUSCULAR; INTRAVENOUS AS NEEDED
Status: DISCONTINUED | OUTPATIENT
Start: 2023-12-12 | End: 2023-12-12 | Stop reason: SURG

## 2023-12-12 RX ORDER — GLYCOPYRROLATE 0.6MG/3ML
SYRINGE (ML) INTRAVENOUS AS NEEDED
Status: DISCONTINUED | OUTPATIENT
Start: 2023-12-12 | End: 2023-12-12 | Stop reason: SURG

## 2023-12-12 RX ORDER — PHENYLEPHRINE HYDROCHLORIDE 10 MG/ML
INJECTION INTRAVENOUS AS NEEDED
Status: DISCONTINUED | OUTPATIENT
Start: 2023-12-12 | End: 2023-12-12 | Stop reason: SURG

## 2023-12-12 RX ORDER — ROCURONIUM BROMIDE 10 MG/ML
INJECTION, SOLUTION INTRAVENOUS AS NEEDED
Status: DISCONTINUED | OUTPATIENT
Start: 2023-12-12 | End: 2023-12-12 | Stop reason: SURG

## 2023-12-12 RX ORDER — SODIUM CHLORIDE 9 MG/ML
INJECTION, SOLUTION INTRAVENOUS CONTINUOUS PRN
Status: DISCONTINUED | OUTPATIENT
Start: 2023-12-12 | End: 2023-12-12 | Stop reason: SURG

## 2023-12-12 RX ORDER — EPHEDRINE SULFATE 50 MG/ML
INJECTION, SOLUTION INTRAVENOUS AS NEEDED
Status: DISCONTINUED | OUTPATIENT
Start: 2023-12-12 | End: 2023-12-12 | Stop reason: SURG

## 2023-12-12 RX ORDER — DEXAMETHASONE SODIUM PHOSPHATE 4 MG/ML
INJECTION, SOLUTION INTRA-ARTICULAR; INTRALESIONAL; INTRAMUSCULAR; INTRAVENOUS; SOFT TISSUE AS NEEDED
Status: DISCONTINUED | OUTPATIENT
Start: 2023-12-12 | End: 2023-12-12 | Stop reason: SURG

## 2023-12-12 RX ORDER — PROPOFOL 10 MG/ML
INJECTION, EMULSION INTRAVENOUS AS NEEDED
Status: DISCONTINUED | OUTPATIENT
Start: 2023-12-12 | End: 2023-12-12 | Stop reason: SURG

## 2023-12-12 RX ORDER — FENTANYL CITRATE 50 UG/ML
INJECTION, SOLUTION INTRAMUSCULAR; INTRAVENOUS AS NEEDED
Status: DISCONTINUED | OUTPATIENT
Start: 2023-12-12 | End: 2023-12-12 | Stop reason: SURG

## 2023-12-12 RX ORDER — ONDANSETRON HYDROCHLORIDE 2 MG/ML
INJECTION, SOLUTION INTRAVENOUS AS NEEDED
Status: DISCONTINUED | OUTPATIENT
Start: 2023-12-12 | End: 2023-12-12 | Stop reason: SURG

## 2023-12-12 RX ORDER — ICOSAPENT ETHYL 1 G/1
2 CAPSULE ORAL 2 TIMES DAILY WITH MEALS
COMMUNITY

## 2023-12-12 RX ORDER — PHENYLEPHRINE HCL IN 0.9% NACL 50MG/250ML
PLASTIC BAG, INJECTION (ML) INTRAVENOUS CONTINUOUS PRN
Status: DISCONTINUED | OUTPATIENT
Start: 2023-12-12 | End: 2023-12-12 | Stop reason: SURG

## 2023-12-12 RX ADMIN — FENTANYL CITRATE 50 MCG: 50 INJECTION, SOLUTION INTRAMUSCULAR; INTRAVENOUS at 11:39

## 2023-12-12 RX ADMIN — SUCCINYLCHOLINE CHLORIDE 160 MG: 20 INJECTION, SOLUTION INTRAMUSCULAR; INTRAVENOUS at 11:48

## 2023-12-12 RX ADMIN — ONDANSETRON 4 MG: 2 INJECTION INTRAMUSCULAR; INTRAVENOUS at 12:09

## 2023-12-12 RX ADMIN — PHENYLEPHRINE HYDROCHLORIDE 200 MCG: 10 INJECTION INTRAVENOUS at 12:02

## 2023-12-12 RX ADMIN — SODIUM CHLORIDE: 9 INJECTION, SOLUTION INTRAVENOUS at 12:57

## 2023-12-12 RX ADMIN — DEXAMETHASONE SODIUM PHOSPHATE 8 MG: 4 INJECTION, SOLUTION INTRA-ARTICULAR; INTRALESIONAL; INTRAMUSCULAR; INTRAVENOUS; SOFT TISSUE at 12:09

## 2023-12-12 RX ADMIN — SODIUM CHLORIDE: 9 INJECTION, SOLUTION INTRAVENOUS at 11:10

## 2023-12-12 RX ADMIN — MIDAZOLAM HYDROCHLORIDE 2 MG: 1 INJECTION, SOLUTION INTRAMUSCULAR; INTRAVENOUS at 11:36

## 2023-12-12 RX ADMIN — LIDOCAINE HYDROCHLORIDE 3 ML: 10 INJECTION, SOLUTION INFILTRATION; PERINEURAL at 11:48

## 2023-12-12 RX ADMIN — PROPOFOL 120 MG: 10 INJECTION, EMULSION INTRAVENOUS at 11:48

## 2023-12-12 RX ADMIN — PHENYLEPHRINE HYDROCHLORIDE 200 MCG: 10 INJECTION INTRAVENOUS at 11:56

## 2023-12-12 RX ADMIN — ROCURONIUM BROMIDE 3 MG: 10 INJECTION INTRAVENOUS at 11:45

## 2023-12-12 RX ADMIN — ROCURONIUM BROMIDE 47 MG: 10 INJECTION INTRAVENOUS at 12:00

## 2023-12-12 RX ADMIN — Medication 25 MCG/MIN: at 11:58

## 2023-12-12 RX ADMIN — GLYCOPYRROLATE 0.2 MG: 0.2 INJECTION INTRAMUSCULAR; INTRAVENOUS at 11:28

## 2023-12-12 RX ADMIN — EPHEDRINE SULFATE 20 MG: 50 INJECTION, SOLUTION INTRAVENOUS at 12:02

## 2023-12-12 NOTE — ANESTHESIA PREPROCEDURE EVALUATION
Relevant Problems   CARDIOVASCULAR   (+) Atrial fibrillation (CMS/HCC)   (+) Essential hypertension   (+) Presence of combination internal cardiac defibrillator (ICD) and pacemaker      NEUROLOGY   (+) CVA (cerebral vascular accident) (CMS/HCC)      RESPIRATORY SYSTEM   (+) SANA on CPAP      Other   (+) Type 2 diabetes mellitus, with long-term current use of insulin (CMS/HCC)       Anesthesia ROS/MED HX    Anesthesia History    Previous anesthetics  No family history of anesthetic complications  No history of anesthetic complications  Pulmonary    Sleep apnea  Neuro/Psych    CVA  Cardiovascular   hypertension   ECG reviewed  Endo/Other   Diabetes  Body Habitus: Obese     2021 TTE  • Mildly dilated LV. Mild concentric left ventricular hypertrophy.  • Preserved LV systolic function. Estimated EF 55- 60%. Dyskinesis of the anteroseptum and apex. Unable to assess diastolic filling pattern of the LV.  • No spontaneous echo contrast seen in the LV. No thrombus present in the LV.  • Mildly dilated RV. Normal RV systolic function. Pacemaker/ICD lead present in the RV.  • Severely dilated LA. No patent foramen ovale present as seen in the LA. Intact LA septum present. Normal doppler flow of pulmonary veins.Systolic blunting of pulmonary venous inflow. No left atrial appendage thrombus present.  • Aortic root normal. Sinuses of Valsalva calcified.  • Tricuspid aortic valve. Moderate diffuse aortic valve calcification present.No aortic valve regurgitation. No aortic valve stenosis.  • A tilting disc mechanical prosthetic mitral valve is present. The prosthetic mitral valve appears normal. Peak gradient of 7 mm Hg, and a mean of 2 mm Hg. No thrombus seen.  • Mild mitral valve regurgitation.  • Tricuspid valve structure is grossly normal. Trace tricuspid valve regurgitation. Jet is insufficient to calculate pulmonary artery pressure.  • Late bubble contrast in LA consistent with pulmonary AVM. No consistent with PFO. No cardiac  source of embolism idenitified.    Past Surgical History:   Procedure Laterality Date   • CARDIAC ELECTROPHYSIOLOGY MAPPING AND ABLATION     • CARDIAC PACEMAKER PLACEMENT     • CARDIAC SURGERY     • CARDIOVERSION     • KIDNEY STONE SURGERY     • KNEE SURGERY     • MITRAL VALVE REPLACEMENT     • TONSILLECTOMY         Physical Exam    Airway   Mallampati: III   TM distance: >3 FB   Neck ROM: full  Cardiovascular - normal   Rhythm: regular   Rate: normalPulmonary - normal   clear to auscultation  Dental - normal        Anesthesia Plan    Plan: general    Technique: general endotracheal     Airway: oral intubation     Pregnancy status reviewed  3 ASA  Anesthetic plan and risks discussed with: patient  Induction:    intravenous

## 2023-12-12 NOTE — ANESTHESIA POSTPROCEDURE EVALUATION
Patient: Roberto Stiles    Procedure Summary     Date: 12/12/23 Room / Location:  [facility] / [facility] CATH/EP/NEURO    Anesthesia Start: 1128 Anesthesia Stop: 1308    Procedure: AV node ablation Diagnosis:       Palpitations      (Persistent atrial fibrillation with [employer] BiV ICD in situ/ Complete AV block status post AV node ablation)    Providers: Layo Alarcon MD Responsible Provider: Rosendo Hudson MD    Anesthesia Type: general ASA Status: 3          Anesthesia Type: general  PACU Vitals  12/12/2023 1257 - 12/12/2023 1357      12/12/2023  1310 12/12/2023  1315 12/12/2023  1330 12/12/2023  1345    BP: 129/60 122/60 122/57 129/61    Temp: 35.8 °C (96.5 °F) -- -- --    Pulse: 60 60 60 60    Resp: 18 18 18 18    SpO2: 93 % 95 % 95 % 97 %            Anesthesia Post Evaluation    Pain management: adequate  Patient location during evaluation: PACU  Patient participation: complete - patient participated  Level of consciousness: awake and alert  Cardiovascular status: acceptable  Airway Patency: adequate  Respiratory status: acceptable  Hydration status: acceptable  Anesthetic complications: no

## 2023-12-12 NOTE — POST-PROCEDURE NOTE
Ate a sandwich and drinking ginger ale.  Ambulated hallway x 4 at 1640. BRP voided QS.  Reviewed discharge instructions.  Heparin lock removed.  Right groin dressing dry and intact.  Discharged at 1700 via wheelchair to front door of lobby. Wife is driving him home.

## 2023-12-12 NOTE — ANESTHESIOLOGIST PRE-PROCEDURE ATTESTATION
Pre-Procedure Patient Identification:  I am the Primary Anesthesiologist and have identified the patient on 12/12/23 at 12:31 PM.   I have confirmed the procedure(s) will be performed by the following surgeon/proceduralist Layo Alarcon MD.

## 2023-12-12 NOTE — POST-PROCEDURE NOTE
[facility] obtained and reviewed by Taryn WOODARD.

## 2023-12-12 NOTE — Clinical Note
Patient placed on procedure table in supine position with arms at side. Positioning devices: all pressure points padded, arm board under arms, heel pads in place, safety strap applied, wrist straps in place and donut foam under head.

## 2023-12-12 NOTE — ADDENDUM NOTE
Addendum  created 12/12/23 1406 by Rosendo Hudson MD    Clinical Note Signed, Intraprocedure Blocks edited, SmartForm saved

## 2023-12-12 NOTE — DISCHARGE INSTRUCTIONS
You will have a follow-up appointment with Dr. Layo Alarcon on on 01/11/23 at 11:15 AM located at [address] [place] [address], [address] [place] [address]. Call [phone] if you need to reschedule.    Medications:  -Please take Coumadin 10 mg by mouth once tonight (12/12/23).  Then resume your usual warfarin (Coumadin) schedule and routine INR checks tomorrow.   -STOP tikosyn  -Continue to take your current home medications as usual.     Groin Site Care:     A bruise or small lump under the skin where the catheters were is normal. These usually go away within one week.     Please check your wound site daily to make sure there is no redness, bleeding, or swelling.     The groin dressing should be removed the day following the procedure. A Band-Aid can be used if needed.    Showering:     You may shower 24 hours after your procedure. Clean the area with mild soap and water. Do not rub the area. Pat the area dry with a clean towel.     Do not take a bath or submerge the area under water for three days.     If you have bleeding where your catheter was:   Lie down and hold direct pressure for 10 minutes. If bleeding does not stop in 10  minutes, or if there is a large amount of bleeding, call 030. If bleeding does stop, rest for at least 4 hours. Call your doctor.    Activity:     Do not bend over, strain, push, pull, run or lift anything over 10 pounds for 7 days.     You may exercise in 7 days.     You may return to work in 7 days.     You may walk and climb stairs when you return home.    Driving:     Do not drive a car or use any machinery for 3 days. Someone else must drive you home today.    Diet:     You may eat your normal diet as per your doctor.    Call your doctor if you have:     Increased redness, heat, pus, bruising, or bleeding at the site after 24 hours.     Swelling at the catheter site.     Increased pain at the catheter site.     Numbness, pain or coolness in the leg.     Temperature of 100.5 degrees  Fahrenheit or greater.     Chest pain or shortness of breath.

## 2023-12-12 NOTE — POST-PROCEDURE NOTE
EP Service Post Op Check- AV Node Ablation     78 y.o. male s/p AV Node ablation by Dr. Alarcon today. He is feeling well. He denies chest pain or shortness of breath. He denies groin pain or back pain.     Visit Vitals  /65   Pulse 63   Temp (!) 36.1 °C (96.9 °F) (Temporal)   Resp 16   Ht 1.829 m (6')   Wt 113 kg (250 lb)   SpO2 98%   BMI 33.91 kg/m²       Physical Exam:  General Appearance: NAD  HEENT: NCAT, MMM, PERRLA, EOMs intact, neck supple  Heart: RRR, S1, S2. No murmurs, rubs or gallops, no JVD  Lungs: CTA bilaterally. No wheezes, rales or rhonchi  Abdomen: soft, non-tender, non-distended  Groins: soft bilaterally, no bleeding, bruits or hematoma. Pressure dressing in right groin  Extremities: warm, no changes in peripheral pulses from pre-procedure (doppler and weak), no change in baseline pedal edema bilaterally  Skin: warm, dry, no rashes. Left chest device well seated.  Neuro: A&O x3, no focal neurologic deficits     Assessment/Plan:  1) Atrial Fibrillation / [employer] Bi-V ICD in situ s/p acutely successful AV Node ablation now CHB and device dependent    -Device programmed to DDDR  bpm with tachy therapies ON  -Patient is Device DEPENDENT    -post EKG  -bedrest x 4 hours  -pressure dressing to right groin overnight. Monitor closely for bleeding and hematomas.  -wound care and activity restrictions discussed with the patient in detail  -take Coumadin 10 mg PO q once tonight.  Then resume normal coumadin dose and scheduled INRs.  -Stop tikosyn.  -pain control with prn Tylenol  -monitor on telemetry while in facility  -anticipated discharge to home this evening  -patient will have a follow-up appointment in the office with Dr. Alarcon on 01/11/23 at 11:15 AM.     VANCE Parks  12/12/2023 1:14 PM

## 2023-12-12 NOTE — POST-PROCEDURE NOTE
Pt reporting heaviness in his chest, VSS, EKG Obtained, Jason WOODARD to bedside to review EKG and assess pt.

## 2023-12-12 NOTE — ANESTHESIA PROCEDURE NOTES
Airway  Start Time: 12/12/2023 11:49 AM  Airway not difficult    General Information and Staff    Anesthesiologist: Rosendo Hudson MD  Performed: anesthesiologist   Performed by: Rosendo Hudson MD  Authorized by: Rosendo Hudson MD      Indications and Patient Condition  Preoxygenated: yes  Patient position: sniffing  MILS maintained throughout  Mask difficulty assessment: 1 - vent by mask    Final Airway Details  Final airway type: endotracheal airway      Successful airway: ETT     Successful intubation technique: video laryngoscopy  Facilitating devices/methods: intubating stylet  Endotracheal tube insertion site: oral  Blade: Harshil  Blade size: #3  ETT size (mm): 7.0  Cormack-Lehane Classification: grade I - full view of glottis  Placement verified by: chest auscultation and capnometry   Measured from: lips  ETT to lips (cm): 21  Number of attempts at approach: 1  Ventilation between attempts: none  Number of other approaches attempted: 0  Atraumatic airway insertion

## 2023-12-14 LAB
ATRIAL RATE: 56
ATRIAL RATE: 64
QRS DURATION: 120
QRS DURATION: 146
QRS DURATION: 158
QT INTERVAL: 456
QT INTERVAL: 508
QT INTERVAL: 530
QTC CALCULATION(BAZETT): 407
QTC CALCULATION(BAZETT): 508
QTC CALCULATION(BAZETT): 530
R AXIS: -36
R AXIS: -40
R AXIS: 71
T WAVE AXIS: -28
T WAVE AXIS: 228
T WAVE AXIS: 32
VENTRICULAR RATE: 48
VENTRICULAR RATE: 60
VENTRICULAR RATE: 60

## 2023-12-14 PROCEDURE — 93010 ELECTROCARDIOGRAM REPORT: CPT | Performed by: INTERNAL MEDICINE

## 2023-12-14 PROCEDURE — 93010 ELECTROCARDIOGRAM REPORT: CPT | Mod: 76 | Performed by: INTERNAL MEDICINE

## 2023-12-15 VITALS — WEIGHT: 250 LBS | BODY MASS INDEX: 33.86 KG/M2 | HEIGHT: 72 IN
